# Patient Record
Sex: MALE | Race: WHITE | Employment: OTHER | URBAN - METROPOLITAN AREA
[De-identification: names, ages, dates, MRNs, and addresses within clinical notes are randomized per-mention and may not be internally consistent; named-entity substitution may affect disease eponyms.]

---

## 2018-11-06 ENCOUNTER — APPOINTMENT (OUTPATIENT)
Dept: CT IMAGING | Facility: CLINIC | Age: 73
End: 2018-11-06
Attending: PHYSICIAN ASSISTANT
Payer: MEDICARE

## 2018-11-06 ENCOUNTER — HOSPITAL ENCOUNTER (EMERGENCY)
Facility: CLINIC | Age: 73
Discharge: HOME | End: 2018-11-06
Attending: PHYSICIAN ASSISTANT | Admitting: PHYSICIAN ASSISTANT
Payer: MEDICARE

## 2018-11-06 VITALS
OXYGEN SATURATION: 96 % | BODY MASS INDEX: 39.25 KG/M2 | RESPIRATION RATE: 16 BRPM | WEIGHT: 265 LBS | TEMPERATURE: 97.6 F | SYSTOLIC BLOOD PRESSURE: 136 MMHG | DIASTOLIC BLOOD PRESSURE: 84 MMHG | HEIGHT: 69 IN

## 2018-11-06 DIAGNOSIS — H11.421 CHEMOSIS, CONJUNCTIVA, RIGHT: ICD-10-CM

## 2018-11-06 DIAGNOSIS — H00.032 CELLULITIS OF RIGHT LOWER EYELID: ICD-10-CM

## 2018-11-06 LAB
ANION GAP SERPL CALCULATED.3IONS-SCNC: 8 MMOL/L (ref 3–14)
BASOPHILS # BLD AUTO: 0.1 10E9/L (ref 0–0.2)
BASOPHILS NFR BLD AUTO: 0.8 %
BUN SERPL-MCNC: 36 MG/DL (ref 7–30)
CALCIUM SERPL-MCNC: 9.2 MG/DL (ref 8.5–10.1)
CHLORIDE SERPL-SCNC: 104 MMOL/L (ref 94–109)
CO2 SERPL-SCNC: 25 MMOL/L (ref 20–32)
CREAT SERPL-MCNC: 1.46 MG/DL (ref 0.66–1.25)
DIFFERENTIAL METHOD BLD: NORMAL
EOSINOPHIL # BLD AUTO: 0.6 10E9/L (ref 0–0.7)
EOSINOPHIL NFR BLD AUTO: 6.2 %
ERYTHROCYTE [DISTWIDTH] IN BLOOD BY AUTOMATED COUNT: 13.2 % (ref 10–15)
GFR SERPL CREATININE-BSD FRML MDRD: 47 ML/MIN/1.7M2
GLUCOSE SERPL-MCNC: 101 MG/DL (ref 70–99)
HCT VFR BLD AUTO: 43.3 % (ref 40–53)
HGB BLD-MCNC: 14.4 G/DL (ref 13.3–17.7)
IMM GRANULOCYTES # BLD: 0.1 10E9/L (ref 0–0.4)
IMM GRANULOCYTES NFR BLD: 0.7 %
LYMPHOCYTES # BLD AUTO: 1.3 10E9/L (ref 0.8–5.3)
LYMPHOCYTES NFR BLD AUTO: 14.4 %
MCH RBC QN AUTO: 31 PG (ref 26.5–33)
MCHC RBC AUTO-ENTMCNC: 33.3 G/DL (ref 31.5–36.5)
MCV RBC AUTO: 93 FL (ref 78–100)
MONOCYTES # BLD AUTO: 0.8 10E9/L (ref 0–1.3)
MONOCYTES NFR BLD AUTO: 8.5 %
NEUTROPHILS # BLD AUTO: 6.1 10E9/L (ref 1.6–8.3)
NEUTROPHILS NFR BLD AUTO: 69.4 %
NRBC # BLD AUTO: 0 10*3/UL
NRBC BLD AUTO-RTO: 0 /100
PLATELET # BLD AUTO: 232 10E9/L (ref 150–450)
POTASSIUM SERPL-SCNC: 4.5 MMOL/L (ref 3.4–5.3)
RBC # BLD AUTO: 4.64 10E12/L (ref 4.4–5.9)
SODIUM SERPL-SCNC: 137 MMOL/L (ref 133–144)
WBC # BLD AUTO: 8.8 10E9/L (ref 4–11)

## 2018-11-06 PROCEDURE — 99285 EMERGENCY DEPT VISIT HI MDM: CPT | Mod: 25 | Performed by: PHYSICIAN ASSISTANT

## 2018-11-06 PROCEDURE — 80048 BASIC METABOLIC PNL TOTAL CA: CPT | Performed by: PHYSICIAN ASSISTANT

## 2018-11-06 PROCEDURE — 70487 CT MAXILLOFACIAL W/DYE: CPT

## 2018-11-06 PROCEDURE — 85025 COMPLETE CBC W/AUTO DIFF WBC: CPT | Performed by: PHYSICIAN ASSISTANT

## 2018-11-06 PROCEDURE — 25000125 ZZHC RX 250: Performed by: PHYSICIAN ASSISTANT

## 2018-11-06 PROCEDURE — 99284 EMERGENCY DEPT VISIT MOD MDM: CPT | Mod: Z6 | Performed by: PHYSICIAN ASSISTANT

## 2018-11-06 PROCEDURE — 25000128 H RX IP 250 OP 636: Performed by: PHYSICIAN ASSISTANT

## 2018-11-06 RX ORDER — GLUCOSAMINE HCL/CHONDROITIN SU 500-400 MG
CAPSULE ORAL DAILY
COMMUNITY

## 2018-11-06 RX ORDER — ATENOLOL 50 MG/1
50 TABLET ORAL DAILY
COMMUNITY
End: 2022-01-20 | Stop reason: DRUGHIGH

## 2018-11-06 RX ORDER — SIMVASTATIN 40 MG
40 TABLET ORAL AT BEDTIME
COMMUNITY
End: 2021-10-27

## 2018-11-06 RX ORDER — IOPAMIDOL 755 MG/ML
80 INJECTION, SOLUTION INTRAVASCULAR ONCE
Status: COMPLETED | OUTPATIENT
Start: 2018-11-06 | End: 2018-11-06

## 2018-11-06 RX ORDER — LEVOTHYROXINE SODIUM 150 UG/1
150 TABLET ORAL DAILY
COMMUNITY
End: 2021-10-27

## 2018-11-06 RX ORDER — LISINOPRIL AND HYDROCHLOROTHIAZIDE 20; 25 MG/1; MG/1
1 TABLET ORAL DAILY
COMMUNITY
End: 2021-10-27

## 2018-11-06 RX ORDER — CEPHALEXIN 500 MG/1
500 CAPSULE ORAL 4 TIMES DAILY
Qty: 28 CAPSULE | Refills: 0 | Status: SHIPPED | OUTPATIENT
Start: 2018-11-06 | End: 2018-11-13

## 2018-11-06 RX ADMIN — SODIUM CHLORIDE 80 ML: 9 INJECTION, SOLUTION INTRAVENOUS at 16:56

## 2018-11-06 RX ADMIN — IOPAMIDOL 80 ML: 755 INJECTION, SOLUTION INTRAVENOUS at 16:56

## 2018-11-06 NOTE — ED AVS SNAPSHOT
[facility] Emergency Department    [address] [facility] [address]    Phone:  [phone]    Fax:  [phone]                                       Anil Gutierres Jr.   MRN: [id number]    Department:  [facility] Emergency Department   Date of Visit:  11/6/2018           After Visit Summary Signature Page     I have received my discharge instructions, and my questions have been answered. I have discussed any challenges I see with this plan with the nurse or doctor.    ..........................................................................................................................................  Patient/Patient Representative Signature      ..........................................................................................................................................  Patient Representative Print Name and Relationship to Patient    ..................................................               ................................................  Date                                   Time    ..........................................................................................................................................  Reviewed by Signature/Title    ...................................................              ..............................................  Date                                               Time          22EPIC Rev 08/18

## 2018-11-06 NOTE — ED AVS SNAPSHOT
[facility] Emergency Department    [address] [address]    Phone:  [phone]    Fax:  [phone]                                       Anil Gutierres Jr.   MRN: [id number]    Department:  [facility] Emergency Department   Date of Visit:  11/6/2018           Patient Information     Date Of Birth          1945        Your diagnoses for this visit were:     Cellulitis of right lower eyelid     Chemosis, conjunctiva, right        You were seen by Jody Alexis PA-C.      Follow-up Information     Follow up with Your eye doctor In 2 days.    Why:  as scheduled for follow-up        Follow up with [facility] Emergency Department.    Specialty:  EMERGENCY MEDICINE    Why:  As needed, If symptoms worsen    Contact information:    [address] [place] [id number] [phone]    Additional information:    The medical center is located at   [address] [facility] ([address] in [place], [place] miles north   of [place]).      24 Hour Appointment Hotline       To make an appointment at any [facility] clinic, call [phone] ([phone]). If you don't have a family doctor or clinic, we will help you find one. [facility] clinics are conveniently located to serve the needs of you and your family.             Review of your medicines      START taking        Dose / Directions Last dose taken    cephALEXin 500 MG capsule   Commonly known as:  KEFLEX   Dose:  500 mg   Quantity:  28 capsule        Take 1 capsule (500 mg) by mouth 4 times daily for 7 days   Refills:  0          Our records show that you are taking the medicines listed below. If these are incorrect, please call your family doctor or clinic.        Dose / Directions Last dose taken    aspirin 81 MG EC tablet   Dose:  81 mg        Take 81 mg by mouth daily   Refills:  0        atenolol 50 MG tablet   Commonly known as:  TENORMIN   Dose:  50 mg        Take 50 mg by mouth daily    Refills:  0        BLOOD GLUCOSE TEST STRIPS Strp        by In Vitro route daily   Refills:  0        levothyroxine 150 MCG tablet   Commonly known as:  SYNTHROID/LEVOTHROID   Dose:  150 mcg        Take 150 mcg by mouth daily   Refills:  0        lisinopril-hydrochlorothiazide 20-25 MG per tablet   Commonly known as:  PRINZIDE/ZESTORETIC   Dose:  1 tablet        Take 1 tablet by mouth daily   Refills:  0        simvastatin 40 MG tablet   Commonly known as:  ZOCOR   Dose:  40 mg        Take 40 mg by mouth At Bedtime   Refills:  0                Prescriptions were sent or printed at these locations (1 Prescription)                   [facility] [place], [place] - [address] [facility]   [address] [facility] [address]    Telephone:  [phone]   Fax:  [phone]   Hours:                  E-Prescribed (1 of 1)         cephALEXin (KEFLEX) 500 MG capsule                Procedures and tests performed during your visit     Basic metabolic panel    CBC with platelets differential    CT Facial Bones with Contrast    Peripheral IV catheter      Orders Needing Specimen Collection     None      Pending Results     Date and Time Order Name Status Description    11/6/2018 1553 CT Facial Bones with Contrast Preliminary             Pending Culture Results     No orders found from 11/4/2018 to 11/7/2018.            Pending Results Instructions     If you had any lab results that were not finalized at the time of your Discharge, you can call the ED Lab Result RN at [phone]. You will be contacted by this team for any positive Lab results or changes in treatment. The nurses are available 7 days a week from 10A to 6:30P.  You can leave a message 24 hours per day and they will return your call.        Test Results From Your Hospital Stay        11/6/2018  4:20 PM      Component Results     Component Value Ref Range & Units Status    WBC 8.8 4.0 - 11.0 10e9/L Final    RBC Count 4.64 4.4 - 5.9 10e12/L Final     Hemoglobin 14.4 13.3 - 17.7 g/dL Final    Hematocrit 43.3 40.0 - 53.0 % Final    MCV 93 78 - 100 fl Final    MCH 31.0 26.5 - 33.0 pg Final    MCHC 33.3 31.5 - 36.5 g/dL Final    RDW 13.2 10.0 - 15.0 % Final    Platelet Count 232 150 - 450 10e9/L Final    Diff Method Automated Method  Final    % Neutrophils 69.4 % Final    % Lymphocytes 14.4 % Final    % Monocytes 8.5 % Final    % Eosinophils 6.2 % Final    % Basophils 0.8 % Final    % Immature Granulocytes 0.7 % Final    Nucleated RBCs 0 0 /100 Final    Absolute Neutrophil 6.1 1.6 - 8.3 10e9/L Final    Absolute Lymphocytes 1.3 0.8 - 5.3 10e9/L Final    Absolute Monocytes 0.8 0.0 - 1.3 10e9/L Final    Absolute Eosinophils 0.6 0.0 - 0.7 10e9/L Final    Absolute Basophils 0.1 0.0 - 0.2 10e9/L Final    Abs Immature Granulocytes 0.1 0 - 0.4 10e9/L Final    Absolute Nucleated RBC 0.0  Final         11/6/2018  4:33 PM      Component Results     Component Value Ref Range & Units Status    Sodium 137 133 - 144 mmol/L Final    Potassium 4.5 3.4 - 5.3 mmol/L Final    Chloride 104 94 - 109 mmol/L Final    Carbon Dioxide 25 20 - 32 mmol/L Final    Anion Gap 8 3 - 14 mmol/L Final    Glucose 101 (H) 70 - 99 mg/dL Final    Urea Nitrogen 36 (H) 7 - 30 mg/dL Final    Creatinine 1.46 (H) 0.66 - 1.25 mg/dL Final    GFR Estimate 47 (L) >60 mL/min/1.7m2 Final    Non  GFR Calc    GFR Estimate If Black 57 (L) >60 mL/min/1.7m2 Final    African American GFR Calc    Calcium 9.2 8.5 - 10.1 mg/dL Final         11/6/2018  5:12 PM      Narrative     CT OF THE ORBITS AND FACE with CONTRAST   11/6/2018 5:07 PM     HISTORY: Right eye pain and conjunctival chemosis.     TECHNIQUE:  Axial scans and coronal reformations after the  administration of 80 mL Isovue 370 IV nonionic iodinated contrast  material.    COMPARISON: None.        Impression     IMPRESSION: There is mild thickening and increased density of the  lower eyelid on the right possibly representing cellulitis. There is  no  "evidence for underlying fluid collection or abscess related to this  area of presumed inflammation. The orbits and globes bilaterally are  unremarkable. There is no evidence for mass, fluid collection or  inflammation within either orbit. The paranasal sinuses are well  aerated with no evidence for acute sinusitis. There are no facial bone  fractures.      Radiation dose for this scan was reduced using automated exposure  control, adjustment of the mA and/or kV according to patient size, or  iterative reconstruction technique                Thank you for choosing [facility]       Thank you for choosing [facility] for your care. Our goal is always to provide you with excellent care. Hearing back from our patients is one way we can continue to improve our services. Please take a few minutes to complete the written survey that you may receive in the mail after you visit with us. Thank you!        [employer] Information     [employer] lets you send messages to your doctor, view your test results, renew your prescriptions, schedule appointments and more. To sign up, go to [email or web address] . Click on \"Log in\" on the left side of the screen, which will take you to the Welcome page. Then click on \"Sign up Now\" on the right side of the page.     You will be asked to enter the access code listed below, as well as some personal information. Please follow the directions to create your username and password.     Your access code is: [email or web address]  Expires: 2019  6:01 PM     Your access code will  in 90 days. If you need help or a new code, please call your [facility] clinic or [phone].        Care EveryWhere ID     This is your Care EveryWhere ID. This could be used by other organizations to access your [facility] medical records  [id number]        Equal Access to Services     JOHN CURTIS : Sylvie Thorpe, sonido saleh, jonathan key. [employer] " [phone].    ATENCIÓN: Si habla español, tiene a nelson disposición servicios gratuitos de asistencia lingüística. Llame al [phone].    We comply with applicable federal civil rights laws and [place] laws. We do not discriminate on the basis of race, color, national origin, age, disability, sex, sexual orientation, or gender identity.            After Visit Summary       This is your record. Keep this with you and show to your community pharmacist(s) and doctor(s) at your next visit.

## 2018-11-06 NOTE — ED PROVIDER NOTES
History     Chief Complaint   Patient presents with     Facial Swelling     right eye swelling for 2 days, [facility] recommend CT scan     HPI  Anil Gutierres Jr. is a 72 year old male who presents from his eye appointment for evaluation of right eye conjunctival chemosis.  Patient was evaluated just prior to arrival at [facility] who evaluated patient and recommended he come into the emergency department in order to receive a CT scan of his orbits and sinuses with and without contrast in order to rule out orbital inflammation, infection, etc.  Patient states he has had 2 days of right eye symptoms.  Yesterday he was experiencing significant right eye pain and limited range of motion.  He states he was unable to look around.  This morning he states his pain had completely resolved in this eye and he is moving his eye without difficulties, but he subsequently developed swelling to the surface of the eyeball.  Patient denies any other associated symptoms.  He has no URI or sinus symptoms.  Patient is a type II diabetic but states he is well controlled and there was reportedly no evidence of diabetic retinopathy on today's eye exam.  Denies fevers, chills, headache, cough, sore throat, sinus pressure, nasal congestion, or rhinorrhea.  Patient denies drainage from the eye.  No matting of the eye.  Pt wears glasses.  She complains of some blurry vision to this right eye.  He denies any preceding injury or trauma to the eye.  No reported foreign body sensation.       Problem List:    There are no active problems to display for this patient.       Past Medical History:    No past medical history on file.    Past Surgical History:    No past surgical history on file.    Family History:    No family history on file.    Social History:  Marital Status:    Social History   Substance Use Topics     Smoking status: Not on file     Smokeless tobacco: Not on file     Alcohol use Not on file     "    Medications:      aspirin 81 MG EC tablet   atenolol (TENORMIN) 50 MG tablet   cephALEXin (KEFLEX) 500 MG capsule   Glucose Blood (BLOOD GLUCOSE TEST STRIPS) STRP   levothyroxine (SYNTHROID/LEVOTHROID) 150 MCG tablet   lisinopril-hydrochlorothiazide (PRINZIDE/ZESTORETIC) 20-25 MG per tablet   simvastatin (ZOCOR) 40 MG tablet         Review of Systems   Constitutional: Negative.    HENT: Negative.    Eyes: Positive for pain and redness. Negative for photophobia, discharge, itching and visual disturbance.   Respiratory: Negative.    Gastrointestinal: Negative.    Musculoskeletal: Negative.    Skin: Negative.    Neurological: Negative.    All other systems reviewed and are negative.      Physical Exam   BP: 166/87  Heart Rate: 52  Temp: 97.6  F (36.4  C)  Resp: 16  Height: 175.3 cm (5' 9\")  Weight: 120.2 kg (265 lb)  SpO2: 100 %      Physical Exam   Constitutional: He is oriented to person, place, and time. He appears well-developed and well-nourished.  Non-toxic appearance. No distress.   HENT:   Head: Normocephalic and atraumatic.   Right Ear: Tympanic membrane, external ear and ear canal normal.   Left Ear: Tympanic membrane, external ear and ear canal normal.   Nose: No mucosal edema or rhinorrhea. Right sinus exhibits no maxillary sinus tenderness. Left sinus exhibits no maxillary sinus tenderness.   Mouth/Throat: Uvula is midline and oropharynx is clear and moist. No oropharyngeal exudate or posterior oropharyngeal erythema.   Eyes: EOM and lids are normal. Pupils are equal, round, and reactive to light. Right eye exhibits chemosis. Right eye exhibits no discharge, no exudate and no hordeolum. No foreign body present in the right eye. Right conjunctiva is injected.       Small localized area of swelling and erythema inferior to right lower eyelid.   Neck: Normal range of motion. Neck supple. No rigidity.   Cardiovascular: Normal rate, regular rhythm and normal heart sounds.    Pulmonary/Chest: Effort normal " and breath sounds normal. No stridor. No respiratory distress. He has no wheezes.   Lymphadenopathy:     He has no cervical adenopathy.   Neurological: He is alert and oriented to person, place, and time.   Skin: Skin is warm and dry. No rash noted.       ED Course     ED Course     Procedures    Results for orders placed or performed during the hospital encounter of 11/06/18 (from the past 24 hour(s))   CBC with platelets differential   Result Value Ref Range    WBC 8.8 4.0 - 11.0 10e9/L    RBC Count 4.64 4.4 - 5.9 10e12/L    Hemoglobin 14.4 13.3 - 17.7 g/dL    Hematocrit 43.3 40.0 - 53.0 %    MCV 93 78 - 100 fl    MCH 31.0 26.5 - 33.0 pg    MCHC 33.3 31.5 - 36.5 g/dL    RDW 13.2 10.0 - 15.0 %    Platelet Count 232 150 - 450 10e9/L    Diff Method Automated Method     % Neutrophils 69.4 %    % Lymphocytes 14.4 %    % Monocytes 8.5 %    % Eosinophils 6.2 %    % Basophils 0.8 %    % Immature Granulocytes 0.7 %    Nucleated RBCs 0 0 /100    Absolute Neutrophil 6.1 1.6 - 8.3 10e9/L    Absolute Lymphocytes 1.3 0.8 - 5.3 10e9/L    Absolute Monocytes 0.8 0.0 - 1.3 10e9/L    Absolute Eosinophils 0.6 0.0 - 0.7 10e9/L    Absolute Basophils 0.1 0.0 - 0.2 10e9/L    Abs Immature Granulocytes 0.1 0 - 0.4 10e9/L    Absolute Nucleated RBC 0.0    Basic metabolic panel   Result Value Ref Range    Sodium 137 133 - 144 mmol/L    Potassium 4.5 3.4 - 5.3 mmol/L    Chloride 104 94 - 109 mmol/L    Carbon Dioxide 25 20 - 32 mmol/L    Anion Gap 8 3 - 14 mmol/L    Glucose 101 (H) 70 - 99 mg/dL    Urea Nitrogen 36 (H) 7 - 30 mg/dL    Creatinine 1.46 (H) 0.66 - 1.25 mg/dL    GFR Estimate 47 (L) >60 mL/min/1.7m2    GFR Estimate If Black 57 (L) >60 mL/min/1.7m2    Calcium 9.2 8.5 - 10.1 mg/dL   CT Facial Bones with Contrast    Narrative    CT OF THE ORBITS AND FACE with CONTRAST   11/6/2018 5:07 PM     HISTORY: Right eye pain and conjunctival chemosis.     TECHNIQUE:  Axial scans and coronal reformations after the  administration of 80 mL Isovue  370 IV nonionic iodinated contrast  material.    COMPARISON: None.      Impression    IMPRESSION: There is mild thickening and increased density of the  lower eyelid on the right possibly representing cellulitis. There is  no evidence for underlying fluid collection or abscess related to this  area of presumed inflammation. The orbits and globes bilaterally are  unremarkable. There is no evidence for mass, fluid collection or  inflammation within either orbit. The paranasal sinuses are well  aerated with no evidence for acute sinusitis. There are no facial bone  fractures.      Radiation dose for this scan was reduced using automated exposure  control, adjustment of the mA and/or kV according to patient size, or  iterative reconstruction technique       Medications   iopamidol (ISOVUE-370) solution 80 mL (80 mLs Intravenous Given 11/6/18 1656)   sodium chloride 0.9 % bag 500mL for CT scan flush use (80 mLs Intravenous Given 11/6/18 1656)       Assessments & Plan (with Medical Decision Making)     Pt is a 72 year old male who presents from his eye appointment for evaluation of right eye conjunctival chemosis.  Patient was evaluated just prior to arrival at [facility] who evaluated patient and recommended he come into the emergency department in order to receive a CT scan of his orbits and sinuses with and without contrast in order to rule out orbital inflammation, infection, etc.  Patient states he has had 2 days of right eye symptoms.  Yesterday he was experiencing significant right eye pain and limited range of motion.  He states he was unable to look around.  This morning he states his pain had completely resolved in this eye and he is moving his eye without difficulties, but he subsequently developed swelling to the surface of the eyeball.  Patient denies any other associated symptoms.  He has no URI or sinus symptoms.  Patient is a type II diabetic but states he is well controlled and there was  reportedly no evidence of diabetic retinopathy on today's eye exam.  No eye drainage.  He denies any preceding injury or trauma to the eye.  No reported foreign body sensation.  Pt is afebrile on arrival.  Exam as above.  Patient had a conference of eye exam just prior to arrival at the eye doctor.  CT of facial bones with contrast was obtained in the emergency department and reveals a mild thickening and increased density of the right lower eyelid possibly representing cellulitis.  No evidence of underlying abscess of this area.  The orbits and globes bilaterally are unremarkable.  No evidence for mass, fluid collection, or inflammation within either orbit.  No evidence of acute sinusitis.  Given this area of potential cellulitis inferior to patient's right lower eyelid, will place on antibiotics.  He has a follow-up in 2 days again with ophthalmology.  Discussed results with patient.  Return precautions were reviewed.  Hand-outs were provided.    Patient was sent with Keflex and was instructed to follow-up with ophthalmology in 2 days as scheduled for recheck and for continued care and management.  He is to return to the ED for persistent and/or worsening symptoms.  Patient expressed understanding of the diagnosis and plan and was discharged home in good condition.    I have reviewed the nursing notes.    I have reviewed the findings, diagnosis, plan and need for follow up with the patient.    New Prescriptions    CEPHALEXIN (KEFLEX) 500 MG CAPSULE    Take 1 capsule (500 mg) by mouth 4 times daily for 7 days       Final diagnoses:   Cellulitis of right lower eyelid   Chemosis, conjunctiva, right       11/6/2018   [facility] EMERGENCY DEPARTMENT      Disclaimer:  This note consists of symbols derived from keyboarding, dictation and/or voice recognition software.  As a result, there may be errors in the script that have gone undetected.  Please consider this when interpreting information found in this chart.      Jody Alexis PA-C  11/07/18 1105

## 2018-11-06 NOTE — ED NOTES
Pt present to ED with right eye pain. Pt reports pain has been present for a couple days. Pt saw an ophthalmologist who recommended a CT scan to assess for orbital swelling.

## 2018-11-07 ASSESSMENT — ENCOUNTER SYMPTOMS
MUSCULOSKELETAL NEGATIVE: 1
RESPIRATORY NEGATIVE: 1
NEUROLOGICAL NEGATIVE: 1
EYE PAIN: 1
EYE REDNESS: 1
CONSTITUTIONAL NEGATIVE: 1
PHOTOPHOBIA: 0
EYE ITCHING: 0
EYE DISCHARGE: 0
GASTROINTESTINAL NEGATIVE: 1

## 2019-02-05 ENCOUNTER — OFFICE VISIT (OUTPATIENT)
Dept: DERMATOLOGY | Facility: CLINIC | Age: 74
End: 2019-02-05
Payer: COMMERCIAL

## 2019-02-05 VITALS — HEART RATE: 60 BPM | DIASTOLIC BLOOD PRESSURE: 75 MMHG | OXYGEN SATURATION: 97 % | SYSTOLIC BLOOD PRESSURE: 133 MMHG

## 2019-02-05 DIAGNOSIS — C44.01 BASAL CELL CARCINOMA (BCC) OF SKIN OF LIP: Primary | ICD-10-CM

## 2019-02-05 DIAGNOSIS — C44.219 BASAL CELL CARCINOMA (BCC) OF LEFT EAR: ICD-10-CM

## 2019-02-05 DIAGNOSIS — L82.1 SEBORRHEIC KERATOSIS: ICD-10-CM

## 2019-02-05 DIAGNOSIS — L81.4 LENTIGO: ICD-10-CM

## 2019-02-05 PROCEDURE — 88331 PATH CONSLTJ SURG 1 BLK 1SPC: CPT | Performed by: DERMATOLOGY

## 2019-02-05 PROCEDURE — 40490 BIOPSY OF LIP: CPT | Performed by: DERMATOLOGY

## 2019-02-05 PROCEDURE — 99203 OFFICE O/P NEW LOW 30 MIN: CPT | Mod: 25 | Performed by: DERMATOLOGY

## 2019-02-05 PROCEDURE — 69100 BIOPSY OF EXTERNAL EAR: CPT | Mod: 51 | Performed by: DERMATOLOGY

## 2019-02-05 NOTE — PROGRESS NOTES
Anil Gutierres Jr. is a 73 year old year old male patient here today for spot on lip and ear.   .  Patient states this has been present for a while.  Patient reports the following symptoms:  growing.  Patient reports the following previous treatments none.  Patient reports the following modifying factors none.  Associated symptoms: none.  Patient has no other skin complaints today.  Remainder of the HPI, Meds, PMH, Allergies, FH, and SH was reviewed in chart.    History reviewed. No pertinent past medical history.    History reviewed. No pertinent surgical history.     History reviewed. No pertinent family history.    Social History     Socioeconomic History     Marital status:      Spouse name: Not on file     Number of children: Not on file     Years of education: Not on file     Highest education level: Not on file   Social Needs     Financial resource strain: Not on file     Food insecurity - worry: Not on file     Food insecurity - inability: Not on file     Transportation needs - medical: Not on file     Transportation needs - non-medical: Not on file   Occupational History     Not on file   Tobacco Use     Smoking status: Never Smoker     Smokeless tobacco: Never Used   Substance and Sexual Activity     Alcohol use: Not on file     Drug use: Not on file     Sexual activity: Not on file   Other Topics Concern     Not on file   Social History Narrative     Not on file       Outpatient Encounter Medications as of 2019   Medication Sig Dispense Refill     aspirin 81 MG EC tablet Take 81 mg by mouth daily       atenolol (TENORMIN) 50 MG tablet Take 50 mg by mouth daily       [] cephALEXin (KEFLEX) 500 MG capsule Take 1 capsule (500 mg) by mouth 4 times daily for 7 days 28 capsule 0     Glucose Blood (BLOOD GLUCOSE TEST STRIPS) STRP by In Vitro route daily       levothyroxine (SYNTHROID/LEVOTHROID) 150 MCG tablet Take 150 mcg by mouth daily       lisinopril-hydrochlorothiazide  (PRINZIDE/ZESTORETIC) 20-25 MG per tablet Take 1 tablet by mouth daily       simvastatin (ZOCOR) 40 MG tablet Take 40 mg by mouth At Bedtime       No facility-administered encounter medications on file as of 2/5/2019.              Review Of Systems  Skin: As above  Eyes: negative  Ears/Nose/Throat: negative  Respiratory: No shortness of breath, dyspnea on exertion, cough, or hemoptysis  Cardiovascular: negative  Gastrointestinal: negative  Genitourinary: negative  Musculoskeletal: negative  Neurologic: negative  Psychiatric: negative  Hematologic/Lymphatic/Immunologic: negative  Endocrine: negative      O:   NAD, WDWN, Alert & Oriented, Mood & Affect wnl, Vitals stable   Here today alone   /75   Pulse 60   SpO2 97%    General appearance normal   Vitals stable   Alert, oriented and in no acute distress      Following lymph nodes palpated: Occipital, Cervical, Supraclavicular no lad   Stuck on papules and brown macules on trunk and ext    L helix 1cm crusted scaly papule    L upper cut lip 1.6cm pink pearly papule      The remainder of expanded problem focused exam was unremarkable; the following areas were examined:  scalp/hair, conjunctiva/lids, face, neck, lips, chest, digits/nails, RUE, LUE.      Eyes: Conjunctivae/lids:Normal     ENT: Lips, buccal mucosa, tongue: normal    MSK:Normal    Cardiovascular: peripheral edema none    Pulm: Breathing Normal    Lymph Nodes: No Head and Neck Lymphadenopathy     Neuro/Psych: Orientation:Normal; Mood/Affect:Normal      MICRO:     L upper cut lip:Orthokeratosis of epidermis with a proliferation of nests of basaloid cells, with peripheral palisading and a haphazard arrangement in the center extending into the dermis, forming nodules.  The tumor cells have hyperchromatic nuclei. Poor cytoplasm and intercellular bridging.    L hliex:Orthokeratosis of epidermis with a proliferation of nests of basaloid cells, with peripheral palisading and a haphazard arrangement in the  center extending into the dermis, forming nodules.  The tumor cells have hyperchromatic nuclei. Poor cytoplasm and intercellular bridging.    A/P:    1. Seborrheic keratosis, lentigo,   2. R/o basal cell carcinoma   TANGENTIAL BIOPSY IN HOUSE:  After consent, anesthesia with LEC and prep, tangential excision performed and dx above confirmed with frozen section histology.  No complications and routine wound care.  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  I explained that treatment of these lesions would be purely cosmetic and not medically neccessary.  I discussed with patient different removal options including excision, cautery and /or laser.      Nature and genetics of benign skin lesions dicussed with patient.  Signs and Symptoms of skin cancer discussed with patient.  Patient encouraged to perform monthly skin exams.  UV precautions reviewed with patient.  Patient to follow up with Primary Care provider regarding elevated blood pressure.  Skin care regimen reviewed with patient: Eliminate harsh soaps, i.e. Dial, zest, irsih spring; Mild soaps such as Cetaphil or Dove sensitive skin, avoid hot or cold showers, aggressive use of emollients including vanicream, cetaphil or cerave discussed with patient.    Risks of non-melanoma skin cancer discussed with patient   Return to clinic next appt

## 2019-02-05 NOTE — LETTER
2019         RE: Anil Gutierres Jr.  [address] [facility] [address]        Dear Colleague,    Thank you for referring your patient, Anil Gutierres Jr., to the [facility]. Please see a copy of my visit note below.    Anil Gutierres Jr. is a 73 year old year old male patient here today for spot on lip and ear.   .  Patient states this has been present for a while.  Patient reports the following symptoms:  growing.  Patient reports the following previous treatments none.  Patient reports the following modifying factors none.  Associated symptoms: none.  Patient has no other skin complaints today.  Remainder of the HPI, Meds, PMH, Allergies, FH, and SH was reviewed in chart.    History reviewed. No pertinent past medical history.    History reviewed. No pertinent surgical history.     History reviewed. No pertinent family history.    Social History     Socioeconomic History     Marital status:      Spouse name: Not on file     Number of children: Not on file     Years of education: Not on file     Highest education level: Not on file   Social Needs     Financial resource strain: Not on file     Food insecurity - worry: Not on file     Food insecurity - inability: Not on file     Transportation needs - medical: Not on file     Transportation needs - non-medical: Not on file   Occupational History     Not on file   Tobacco Use     Smoking status: Never Smoker     Smokeless tobacco: Never Used   Substance and Sexual Activity     Alcohol use: Not on file     Drug use: Not on file     Sexual activity: Not on file   Other Topics Concern     Not on file   Social History Narrative     Not on file       Outpatient Encounter Medications as of 2019   Medication Sig Dispense Refill     aspirin 81 MG EC tablet Take 81 mg by mouth daily       atenolol (TENORMIN) 50 MG tablet Take 50 mg by mouth daily       [] cephALEXin (KEFLEX) 500 MG capsule Take 1 capsule (500 mg) by mouth 4 times  daily for 7 days 28 capsule 0     Glucose Blood (BLOOD GLUCOSE TEST STRIPS) STRP by In Vitro route daily       levothyroxine (SYNTHROID/LEVOTHROID) 150 MCG tablet Take 150 mcg by mouth daily       lisinopril-hydrochlorothiazide (PRINZIDE/ZESTORETIC) 20-25 MG per tablet Take 1 tablet by mouth daily       simvastatin (ZOCOR) 40 MG tablet Take 40 mg by mouth At Bedtime       No facility-administered encounter medications on file as of 2/5/2019.              Review Of Systems  Skin: As above  Eyes: negative  Ears/Nose/Throat: negative  Respiratory: No shortness of breath, dyspnea on exertion, cough, or hemoptysis  Cardiovascular: negative  Gastrointestinal: negative  Genitourinary: negative  Musculoskeletal: negative  Neurologic: negative  Psychiatric: negative  Hematologic/Lymphatic/Immunologic: negative  Endocrine: negative      O:   NAD, WDWN, Alert & Oriented, Mood & Affect wnl, Vitals stable   Here today alone   /75   Pulse 60   SpO2 97%    General appearance normal   Vitals stable   Alert, oriented and in no acute distress      Following lymph nodes palpated: Occipital, Cervical, Supraclavicular no lad   Stuck on papules and brown macules on trunk and ext    L helix 1cm crusted scaly papule    L upper cut lip 1.6cm pink pearly papule      The remainder of expanded problem focused exam was unremarkable; the following areas were examined:  scalp/hair, conjunctiva/lids, face, neck, lips, chest, digits/nails, RUE, LUE.      Eyes: Conjunctivae/lids:Normal     ENT: Lips, buccal mucosa, tongue: normal    MSK:Normal    Cardiovascular: peripheral edema none    Pulm: Breathing Normal    Lymph Nodes: No Head and Neck Lymphadenopathy     Neuro/Psych: Orientation:Normal; Mood/Affect:Normal      MICRO:     L upper cut lip:Orthokeratosis of epidermis with a proliferation of nests of basaloid cells, with peripheral palisading and a haphazard arrangement in the center extending into the dermis, forming nodules.  The tumor  cells have hyperchromatic nuclei. Poor cytoplasm and intercellular bridging.    L hliex:Orthokeratosis of epidermis with a proliferation of nests of basaloid cells, with peripheral palisading and a haphazard arrangement in the center extending into the dermis, forming nodules.  The tumor cells have hyperchromatic nuclei. Poor cytoplasm and intercellular bridging.    A/P:    1. Seborrheic keratosis, lentigo,   2. R/o basal cell carcinoma   TANGENTIAL BIOPSY IN HOUSE:  After consent, anesthesia with LEC and prep, tangential excision performed and dx above confirmed with frozen section histology.  No complications and routine wound care.  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  I explained that treatment of these lesions would be purely cosmetic and not medically neccessary.  I discussed with patient different removal options including excision, cautery and /or laser.      Nature and genetics of benign skin lesions dicussed with patient.  Signs and Symptoms of skin cancer discussed with patient.  Patient encouraged to perform monthly skin exams.  UV precautions reviewed with patient.  Patient to follow up with Primary Care provider regarding elevated blood pressure.  Skin care regimen reviewed with patient: Eliminate harsh soaps, i.e. Dial, zest, irsih spring; Mild soaps such as Cetaphil or Dove sensitive skin, avoid hot or cold showers, aggressive use of emollients including vanicream, cetaphil or cerave discussed with patient.    Risks of non-melanoma skin cancer discussed with patient   Return to clinic next appt     Again, thank you for allowing me to participate in the care of your patient.        Sincerely,        Martin Marquis MD

## 2019-02-05 NOTE — NURSING NOTE
"Initial /75   Pulse 60   SpO2 97%  Estimated body mass index is 39.13 kg/m  as calculated from the following:    Height as of 11/6/18: 1.753 m (5' 9\").    Weight as of 11/6/18: 120.2 kg (265 lb). .      "

## 2019-02-05 NOTE — PATIENT INSTRUCTIONS
Wound Care Instructions     FOR SUPERFICIAL WOUNDS     [facility] [phone]    [facility] [phone]                       AFTER 24 HOURS YOU SHOULD REMOVE THE BANDAGE AND BEGIN DAILY DRESSING CHANGES AS FOLLOWS:     1) Remove Dressing.     2) Clean and dry the area with tap water using a Q-tip or sterile gauze pad.     3) Apply Vaseline, Aquaphor, Polysporin ointment or Bacitracin ointment over entire wound.  Do NOT use Neosporin ointment.     4) Cover the wound with a band-aid, or a sterile non-stick gauze pad and micropore paper tape      REPEAT THESE INSTRUCTIONS AT LEAST ONCE A DAY UNTIL THE WOUND HAS COMPLETELY HEALED.    It is an old wives tale that a wound heals better when it is exposed to air and allowed to dry out. The wound will heal faster with a better cosmetic result if it is kept moist with ointment and covered with a bandage.    **Do not let the wound dry out.**      Supplies Needed:      *Cotton tipped applicators (Q-tips)    *Polysporin Ointment or Bacitracin Ointment (NOT NEOSPORIN)    *Band-aids or non-stick gauze pads and micropore paper tape.      PATIENT INFORMATION:    During the healing process you will notice a number of changes. All wounds develop a small halo of redness surrounding the wound.  This means healing is occurring. Severe itching with extensive redness usually indicates sensitivity to the ointment or bandage tape used to dress the wound.  You should call our office if this develops.      Swelling  and/or discoloration around your surgical site is common, particularly when performed around the eye.    All wounds normally drain.  The larger the wound the more drainage there will be.  After 7-10 days, you will notice the wound beginning to shrink and new skin will begin to grow.  The wound is healed when you can see skin has formed over the entire area.  A healed wound has a healthy, shiny look to the surface and is red to dark pink in color  to normalize.  Wounds may take approximately 4-6 weeks to heal.  Larger wounds may take 6-8 weeks.  After the wound is healed you may discontinue dressing changes.    You may experience a sensation of tightness as your wound heals. This is normal and will gradually subside.    Your healed wound may be sensitive to temperature changes. This sensitivity improves with time, but if you re having a lot of discomfort, try to avoid temperature extremes.    Patients frequently experience itching after their wound appears to have healed because of the continue healing under the skin.  Plain Vaseline will help relieve the itching.        POSSIBLE COMPLICATIONS    BLEEDIN. Leave the bandage in place.  2. Use tightly rolled up gauze or a cloth to apply direct pressure over the bandage for 30  minutes.  3. Reapply pressure for an additional 30 minutes if necessary  4. Use additional gauze and tape to maintain pressure once the bleeding has stopped.

## 2019-02-06 ENCOUNTER — TELEPHONE (OUTPATIENT)
Dept: DERMATOLOGY | Facility: CLINIC | Age: 74
End: 2019-02-06

## 2019-02-06 NOTE — TELEPHONE ENCOUNTER
Patient notified. Patient verbalized understanding. Scheduled already for for MOHS Surgery x 2 and he did verify he was given Mohs brochure/Pre-op letter.     Marcela Engel RN

## 2019-02-06 NOTE — TELEPHONE ENCOUNTER
Martin Marquis MD  [facility]             L upper lip basal cell carcinoma   L helix basal cell carcinoma   Schedule excision

## 2019-02-19 ENCOUNTER — OFFICE VISIT (OUTPATIENT)
Dept: DERMATOLOGY | Facility: CLINIC | Age: 74
End: 2019-02-19
Payer: COMMERCIAL

## 2019-02-19 VITALS — OXYGEN SATURATION: 94 % | HEART RATE: 57 BPM | SYSTOLIC BLOOD PRESSURE: 120 MMHG | DIASTOLIC BLOOD PRESSURE: 71 MMHG

## 2019-02-19 DIAGNOSIS — C44.01 BASAL CELL CARCINOMA OF LIP: Primary | ICD-10-CM

## 2019-02-19 PROCEDURE — 14061 TIS TRNFR E/N/E/L10.1-30SQCM: CPT | Performed by: DERMATOLOGY

## 2019-02-19 PROCEDURE — 17312 MOHS ADDL STAGE: CPT | Performed by: DERMATOLOGY

## 2019-02-19 PROCEDURE — 17311 MOHS 1 STAGE H/N/HF/G: CPT | Mod: 51 | Performed by: DERMATOLOGY

## 2019-02-19 NOTE — PATIENT INSTRUCTIONS
Sutured Wound Care Instructions  For Lips or Chin       ? No strenuous activity for 48 hours. Resume moderate activity in 48 hours. No heavy exercising until you are seen for follow up in one week.     ? Take Tylenol as needed for discomfort.                         ? Do not drink alcoholic beverages for 48 hours.     ? Keep the pressure bandage in place for 24 hours. If the bandage becomes blood tinged or loose, reinforce it with gauze and tape.       (Refer to the reverse side of this page for management of bleeding).    ? Remove bandage in 24 hours     ? NO STRAWS or puckering motion  for 2 weeks    ? Leave the flat bandage in place until your follow up appointment.    ? Keep the bandage dry. Wash around it carefully.    ? If the tape becomes soiled or starts to come off, reinforce it with additional paper tape.    ? Do not smoke for 3 weeks; smoking is detrimental to wound healing.    ? It is normal to have swelling and bruising around the surgical site. The bruising will fade in approximately 10-14 days. Elevate the area to reduce swelling.    ? Try to keep your lips / chin as immobile as possible. Refrain from laughing, smiling and yawning for 3 weeks.    ? Eat soft foods for the first 24 hours and take small bites of food for the entire three weeks.    ? When brushing your teeth, you should use a child s toothbrush or use mouth wash to prevent stretching of surgery site.    ? Numbness, itchiness and sensitivity to temperature changes can occur after surgery and may take up to 18 months to normalize.          POSSIBLE COMPLICATIONS      BLEEDIN. Leave the bandage in place.  2. Use tightly rolled up gauze or a cloth to apply direct pressure over the bandage for 20   minutes.  3. Reapply pressure for an additional 20 minutes if necessary  4. Call the office or go to the nearest emergency room if pressure fails to stop the bleeding.  5. Use additional gauze and tape to maintain pressure once the  bleeding has stopped.        PAIN:    1. Post operative pain should slowly get better, never worse.  2. A severe increase in pain may indicate a problem. Call the office if this occurs.            In case of emergency phone:  [phone]  Doctor Kandis:[phone]

## 2019-02-19 NOTE — NURSING NOTE
Chief Complaint   Patient presents with     Derm Problem     mohs 1 of 2       Vitals:    02/19/19 0759   BP: 120/71   Pulse: 57   SpO2: 94%     Wt Readings from Last 1 Encounters:   11/06/18 120.2 kg (265 lb)       Macrina Mcfadden LPN.................2/19/2019

## 2019-02-19 NOTE — LETTER
2/19/2019         RE: Anil Gutierres Jr.  [address] [facility] [address]        Dear Colleague,    Thank you for referring your patient, Anil Gutierres Jr., to the [facility]. Please see a copy of my visit note below.    Surgical Office Location :   [facility]  [address] [place] [address]      Anil Gutierres Jr. is a 73 year old year old male patient here today for evaluation and managment of basal cell carcinoma on lip.  Patient reports the following modifying factors none.  Associated symptoms: none.  Patient has no other skin complaints today.  Remainder of the HPI, Meds, PMH, Allergies, FH, and SH was reviewed in chart.      Past Medical History:   Diagnosis Date     Basal cell carcinoma        History reviewed. No pertinent surgical history.     History reviewed. No pertinent family history.    Social History     Socioeconomic History     Marital status:      Spouse name: Not on file     Number of children: Not on file     Years of education: Not on file     Highest education level: Not on file   Social Needs     Financial resource strain: Not on file     Food insecurity - worry: Not on file     Food insecurity - inability: Not on file     Transportation needs - medical: Not on file     Transportation needs - non-medical: Not on file   Occupational History     Not on file   Tobacco Use     Smoking status: Never Smoker     Smokeless tobacco: Never Used   Substance and Sexual Activity     Alcohol use: Not on file     Drug use: Not on file     Sexual activity: Not on file   Other Topics Concern     Not on file   Social History Narrative     Not on file       Outpatient Encounter Medications as of 2/19/2019   Medication Sig Dispense Refill     aspirin 81 MG EC tablet Take 81 mg by mouth daily       atenolol (TENORMIN) 50 MG tablet Take 50 mg by mouth daily       Glucose Blood (BLOOD GLUCOSE TEST STRIPS) STRP by In Vitro route daily       levothyroxine  (SYNTHROID/LEVOTHROID) 150 MCG tablet Take 150 mcg by mouth daily       lisinopril-hydrochlorothiazide (PRINZIDE/ZESTORETIC) 20-25 MG per tablet Take 1 tablet by mouth daily       simvastatin (ZOCOR) 40 MG tablet Take 40 mg by mouth At Bedtime       [] cephALEXin (KEFLEX) 500 MG capsule Take 1 capsule (500 mg) by mouth 4 times daily for 7 days 28 capsule 0     No facility-administered encounter medications on file as of 2019.              Review Of Systems  Skin: As above  Eyes: negative  Ears/Nose/Throat: negative  Respiratory: No shortness of breath, dyspnea on exertion, cough, or hemoptysis  Cardiovascular: negative  Gastrointestinal: negative  Genitourinary: negative  Musculoskeletal: negative  Neurologic: negative  Psychiatric: negative  Hematologic/Lymphatic/Immunologic: negative  Endocrine: negative      O:   NAD, WDWN, Alert & Oriented, Mood & Affect wnl, Vitals stable   Here today alone   /71   Pulse 57   SpO2 94%    General appearance normal   Vitals stable   Alert, oriented and in no acute distress     L upper cut lip 1.6cm pink pearly papule       Eyes: Conjunctivae/lids:Normal     ENT: Lips, buccal mucosa, tongue: normal    MSK:Normal    Cardiovascular: peripheral edema none    Pulm: Breathing Normal    Lymph Nodes: No Head and Neck Lymphadenopathy     Neuro/Psych: Orientation:Normal; Mood/Affect:Normal      A/P:  1. 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 Indication for Mohs was Location. Patient confirmed the site with Dr. Marquis.  After anesthesia with LEC, the tumor was excised using standard Mohs technique in 2 stages(s).  CLEAR MARGINS OBTAINED and Final defect size was 2.3 x 2.5 cm.     REPAIR WITH BUROW'S FLAP: Because of the Because of the size and full thickness nature of the defect, an advancement flap was planned. After LEC anesthesia and prep, the Burow's triangles were excised.  One Burow's triangle was displaced laterally laong alar rim to hide incisions within skin relaxation lines. The advancement flap was raised by dissection in the deep subcutaneous plane. The remaining wound edges were undermined and hemostasis was obtained. The flap was advanced into the defect with care to avoid distortion and was sutured into place in a layered fashion using Vicryl and Fast Absorbing sutures. Postoperative size was 4.5 x 3 cm.  EBL minimal; complications none; wound care routine.  The patient was discharged in good condition and will return in one week for wound evaluation.        BENIGN LESIONS DISCUSSED WITH PATIENT:  I discussed the specifics of tumor, prognosis, and genetics of benign lesions.  I explained that treatment of these lesions would be purely cosmetic and not medically neccessary.  I discussed with patient different removal options including excision, cautery and /or laser.      Nature and genetics of benign skin lesions dicussed with patient.  Signs and Symptoms of skin cancer discussed with patient.  Patient encouraged to perform monthly skin exams.  UV precautions reviewed with patient.  Patient to follow up with Primary Care provider regarding elevated blood pressure.  Skin care regimen reviewed with patient: Eliminate harsh soaps, i.e. Dial, zest, irsih spring; Mild soaps such as Cetaphil or Dove sensitive skin, avoid hot or cold showers, aggressive use of emollients including vanicream, cetaphil or cerave discussed with patient.    Risks of non-melanoma skin cancer discussed with patient   Return to clinic 3 months      Again, thank you for allowing me to participate in the care of your patient.        Sincerely,        Martin Marquis MD

## 2019-02-19 NOTE — PROGRESS NOTES
Anil Gutierres Jr. is a 73 year old year old male patient here today for evaluation and managment of basal cell carcinoma on lip.  Patient reports the following modifying factors none.  Associated symptoms: none.  Patient has no other skin complaints today.  Remainder of the HPI, Meds, PMH, Allergies, FH, and SH was reviewed in chart.      Past Medical History:   Diagnosis Date     Basal cell carcinoma        History reviewed. No pertinent surgical history.     History reviewed. No pertinent family history.    Social History     Socioeconomic History     Marital status:      Spouse name: Not on file     Number of children: Not on file     Years of education: Not on file     Highest education level: Not on file   Social Needs     Financial resource strain: Not on file     Food insecurity - worry: Not on file     Food insecurity - inability: Not on file     Transportation needs - medical: Not on file     Transportation needs - non-medical: Not on file   Occupational History     Not on file   Tobacco Use     Smoking status: Never Smoker     Smokeless tobacco: Never Used   Substance and Sexual Activity     Alcohol use: Not on file     Drug use: Not on file     Sexual activity: Not on file   Other Topics Concern     Not on file   Social History Narrative     Not on file       Outpatient Encounter Medications as of 2019   Medication Sig Dispense Refill     aspirin 81 MG EC tablet Take 81 mg by mouth daily       atenolol (TENORMIN) 50 MG tablet Take 50 mg by mouth daily       Glucose Blood (BLOOD GLUCOSE TEST STRIPS) STRP by In Vitro route daily       levothyroxine (SYNTHROID/LEVOTHROID) 150 MCG tablet Take 150 mcg by mouth daily       lisinopril-hydrochlorothiazide (PRINZIDE/ZESTORETIC) 20-25 MG per tablet Take 1 tablet by mouth daily       simvastatin (ZOCOR) 40 MG tablet Take 40 mg by mouth At Bedtime       [] cephALEXin (KEFLEX) 500 MG capsule Take 1 capsule (500 mg) by mouth 4 times daily for 7 days  28 capsule 0     No facility-administered encounter medications on file as of 2/19/2019.              Review Of Systems  Skin: As above  Eyes: negative  Ears/Nose/Throat: negative  Respiratory: No shortness of breath, dyspnea on exertion, cough, or hemoptysis  Cardiovascular: negative  Gastrointestinal: negative  Genitourinary: negative  Musculoskeletal: negative  Neurologic: negative  Psychiatric: negative  Hematologic/Lymphatic/Immunologic: negative  Endocrine: negative      O:   NAD, WDWN, Alert & Oriented, Mood & Affect wnl, Vitals stable   Here today alone   /71   Pulse 57   SpO2 94%    General appearance normal   Vitals stable   Alert, oriented and in no acute distress     L upper cut lip 1.6cm pink pearly papule       Eyes: Conjunctivae/lids:Normal     ENT: Lips, buccal mucosa, tongue: normal    MSK:Normal    Cardiovascular: peripheral edema none    Pulm: Breathing Normal    Lymph Nodes: No Head and Neck Lymphadenopathy     Neuro/Psych: Orientation:Normal; Mood/Affect:Normal      A/P:  1. 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 Indication for Mohs was Location. Patient confirmed the site with Dr. Marquis.  After anesthesia with LEC, the tumor was excised using standard Mohs technique in 2 stages(s).  CLEAR MARGINS OBTAINED and Final defect size was 2.3 x 2.5 cm.     REPAIR WITH BUROW'S FLAP: Because of the Because of the size and full thickness nature of the defect, an advancement flap was planned. After LEC anesthesia and prep, the Burow's triangles were excised. One Burow's triangle was displaced laterally laong alar rim to hide incisions within skin relaxation lines. The advancement flap was raised by dissection in the deep subcutaneous plane. The remaining wound edges were undermined and hemostasis was obtained. The flap was advanced into the defect with care to avoid distortion and was sutured into place  in a layered fashion using Vicryl and Fast Absorbing sutures. Postoperative size was 4.5 x 3 cm.  EBL minimal; complications none; wound care routine.  The patient was discharged in good condition and will return in one week for wound evaluation.        BENIGN LESIONS DISCUSSED WITH PATIENT:  I discussed the specifics of tumor, prognosis, and genetics of benign lesions.  I explained that treatment of these lesions would be purely cosmetic and not medically neccessary.  I discussed with patient different removal options including excision, cautery and /or laser.      Nature and genetics of benign skin lesions dicussed with patient.  Signs and Symptoms of skin cancer discussed with patient.  Patient encouraged to perform monthly skin exams.  UV precautions reviewed with patient.  Patient to follow up with Primary Care provider regarding elevated blood pressure.  Skin care regimen reviewed with patient: Eliminate harsh soaps, i.e. Dial, zest, irsih spring; Mild soaps such as Cetaphil or Dove sensitive skin, avoid hot or cold showers, aggressive use of emollients including vanicream, cetaphil or cerave discussed with patient.    Risks of non-melanoma skin cancer discussed with patient   Return to clinic 3 months

## 2019-02-25 NOTE — PROGRESS NOTES
Surgical Office Location :   [facility]  [address] [place] [address]

## 2019-02-26 ENCOUNTER — OFFICE VISIT (OUTPATIENT)
Dept: DERMATOLOGY | Facility: CLINIC | Age: 74
End: 2019-02-26
Payer: COMMERCIAL

## 2019-02-26 ENCOUNTER — ALLIED HEALTH/NURSE VISIT (OUTPATIENT)
Dept: DERMATOLOGY | Facility: CLINIC | Age: 74
End: 2019-02-26
Payer: COMMERCIAL

## 2019-02-26 VITALS
SYSTOLIC BLOOD PRESSURE: 129 MMHG | BODY MASS INDEX: 36.96 KG/M2 | HEIGHT: 71 IN | DIASTOLIC BLOOD PRESSURE: 74 MMHG | HEART RATE: 60 BPM

## 2019-02-26 DIAGNOSIS — C44.219 BASAL CELL CARCINOMA (BCC) OF HELIX OF LEFT EAR: Primary | ICD-10-CM

## 2019-02-26 DIAGNOSIS — Z48.01 ENCOUNTER FOR CHANGE OR REMOVAL OF SURGICAL WOUND DRESSING: Primary | ICD-10-CM

## 2019-02-26 PROCEDURE — 17311 MOHS 1 STAGE H/N/HF/G: CPT | Mod: 79 | Performed by: DERMATOLOGY

## 2019-02-26 PROCEDURE — 15260 FTH/GFT FR N/E/E/L 20 SQCM/<: CPT | Mod: 79 | Performed by: DERMATOLOGY

## 2019-02-26 PROCEDURE — 99207 ZZC NO CHARGE NURSE ONLY: CPT

## 2019-02-26 NOTE — PROGRESS NOTES
Anil Gutierres Jr. is a 73 year old year old male patient here today for evaluation and managment of basal cell carcinoma on left helix.  Patient reports the following modifying factors none.  Associated symptoms: none.  Patient has no other skin complaints today.  Remainder of the HPI, Meds, PMH, Allergies, FH, and SH was reviewed in chart.      Past Medical History:   Diagnosis Date     Basal cell carcinoma        History reviewed. No pertinent surgical history.     History reviewed. No pertinent family history.    Social History     Socioeconomic History     Marital status:      Spouse name: Not on file     Number of children: Not on file     Years of education: Not on file     Highest education level: Not on file   Occupational History     Not on file   Social Needs     Financial resource strain: Not on file     Food insecurity:     Worry: Not on file     Inability: Not on file     Transportation needs:     Medical: Not on file     Non-medical: Not on file   Tobacco Use     Smoking status: Never Smoker     Smokeless tobacco: Never Used   Substance and Sexual Activity     Alcohol use: Not on file     Drug use: Not on file     Sexual activity: Not on file   Lifestyle     Physical activity:     Days per week: Not on file     Minutes per session: Not on file     Stress: Not on file   Relationships     Social connections:     Talks on phone: Not on file     Gets together: Not on file     Attends Rastafarian service: Not on file     Active member of club or organization: Not on file     Attends meetings of clubs or organizations: Not on file     Relationship status: Not on file     Intimate partner violence:     Fear of current or ex partner: Not on file     Emotionally abused: Not on file     Physically abused: Not on file     Forced sexual activity: Not on file   Other Topics Concern     Not on file   Social History Narrative     Not on file       Outpatient Encounter Medications as of 2/26/2019   Medication  "Sig Dispense Refill     aspirin 81 MG EC tablet Take 81 mg by mouth daily       atenolol (TENORMIN) 50 MG tablet Take 50 mg by mouth daily       Glucose Blood (BLOOD GLUCOSE TEST STRIPS) STRP by In Vitro route daily       levothyroxine (SYNTHROID/LEVOTHROID) 150 MCG tablet Take 150 mcg by mouth daily       lisinopril-hydrochlorothiazide (PRINZIDE/ZESTORETIC) 20-25 MG per tablet Take 1 tablet by mouth daily       simvastatin (ZOCOR) 40 MG tablet Take 40 mg by mouth At Bedtime       [] cephALEXin (KEFLEX) 500 MG capsule Take 1 capsule (500 mg) by mouth 4 times daily for 7 days 28 capsule 0     No facility-administered encounter medications on file as of 2019.              Review Of Systems  Skin: As above  Eyes: negative  Ears/Nose/Throat: negative  Respiratory: No shortness of breath, dyspnea on exertion, cough, or hemoptysis  Cardiovascular: negative  Gastrointestinal: negative  Genitourinary: negative  Musculoskeletal: negative  Neurologic: negative  Psychiatric: negative  Hematologic/Lymphatic/Immunologic: negative  Endocrine: negative      O:   NAD, WDWN, Alert & Oriented, Mood & Affect wnl, Vitals stable   Here today alone   /74   Pulse 60   Ht 1.803 m (5' 11\")   BMI 36.96 kg/m     General appearance normal   Vitals stable   Alert, oriented and in no acute distress     L helix 1cm pink pearly papule       Eyes: Conjunctivae/lids:Normal     ENT: Lips, buccal mucosa, tongue: normal    MSK:Normal    Cardiovascular: peripheral edema none    Pulm: Breathing Normal    Neuro/Psych: Orientation:Normal; Mood/Affect:Normal      A/P:  1. L helix basal cell carcinoma   MOHS:   Location    After PGACAC discussed with patient, decision for Mohs surgery was made. Indication for Mohs was Location. Patient confirmed the site with Dr. Marquis.  After anesthesia with LEC, the tumor was excised using standard Mohs technique in 1 stages(s).  CLEAR MARGINS OBTAINED and Final defect size was 1.2 x 1.4 cm. "       REPAIR FTSG FROM POSTAURICULAR: Because of the full-thickness nature of the defect and to avoid distortion, a full-thickness skin graft was planned. After LEC anesthesia and prep, a template was made of the defect and the graft was harvested from the ipsilateral post-auricular sulcus.  The graft was defatted and trimmed to fit the defect. It was sutured into place with Fast Absorbing Plain Gut suture and a taped Bolster dressing was applied.    The donor site was converted to a fusiform defect, and after hemostasis, was closed with subcutaneous stitches using Vicryl sutures.   EBL minimal; complications none; wound care routine.  The patient was discharged in good condition and will return on one week for wound evaluation.  BENIGN LESIONS DISCUSSED WITH PATIENT:  I discussed the specifics of tumor, prognosis, and genetics of benign lesions.  I explained that treatment of these lesions would be purely cosmetic and not medically neccessary.  I discussed with patient different removal options including excision, cautery and /or laser.      Nature and genetics of benign skin lesions dicussed with patient.  Signs and Symptoms of skin cancer discussed with patient.  Patient encouraged to perform monthly skin exams.  UV precautions reviewed with patient.  Patient to follow up with Primary Care provider regarding elevated blood pressure.  Skin care regimen reviewed with patient: Eliminate harsh soaps, i.e. Dial, zest, irsih spring; Mild soaps such as Cetaphil or Dove sensitive skin, avoid hot or cold showers, aggressive use of emollients including vanicream, cetaphil or cerave discussed with patient.    Risks of non-melanoma skin cancer discussed with patient   Return to clinic 6 months

## 2019-02-26 NOTE — LETTER
2/26/2019         RE: Anil Gutierres Jr.  [address] [address] [address]        Dear Colleague,    Thank you for referring your patient, Anil Gutierres Jr., to the [facility]. Please see a copy of my visit note below.    Surgical Office Location :   [facility]  [address] [place] [address]      Anil Gutierres Jr. is a 73 year old year old male patient here today for evaluation and managment of basal cell carcinoma on left helix.  Patient reports the following modifying factors none.  Associated symptoms: none.  Patient has no other skin complaints today.  Remainder of the HPI, Meds, PMH, Allergies, FH, and SH was reviewed in chart.      Past Medical History:   Diagnosis Date     Basal cell carcinoma        History reviewed. No pertinent surgical history.     History reviewed. No pertinent family history.    Social History     Socioeconomic History     Marital status:      Spouse name: Not on file     Number of children: Not on file     Years of education: Not on file     Highest education level: Not on file   Occupational History     Not on file   Social Needs     Financial resource strain: Not on file     Food insecurity:     Worry: Not on file     Inability: Not on file     Transportation needs:     Medical: Not on file     Non-medical: Not on file   Tobacco Use     Smoking status: Never Smoker     Smokeless tobacco: Never Used   Substance and Sexual Activity     Alcohol use: Not on file     Drug use: Not on file     Sexual activity: Not on file   Lifestyle     Physical activity:     Days per week: Not on file     Minutes per session: Not on file     Stress: Not on file   Relationships     Social connections:     Talks on phone: Not on file     Gets together: Not on file     Attends Christianity service: Not on file     Active member of club or organization: Not on file     Attends meetings of clubs or organizations: Not on file     Relationship status: Not  "on file     Intimate partner violence:     Fear of current or ex partner: Not on file     Emotionally abused: Not on file     Physically abused: Not on file     Forced sexual activity: Not on file   Other Topics Concern     Not on file   Social History Narrative     Not on file       Outpatient Encounter Medications as of 2019   Medication Sig Dispense Refill     aspirin 81 MG EC tablet Take 81 mg by mouth daily       atenolol (TENORMIN) 50 MG tablet Take 50 mg by mouth daily       Glucose Blood (BLOOD GLUCOSE TEST STRIPS) STRP by In Vitro route daily       levothyroxine (SYNTHROID/LEVOTHROID) 150 MCG tablet Take 150 mcg by mouth daily       lisinopril-hydrochlorothiazide (PRINZIDE/ZESTORETIC) 20-25 MG per tablet Take 1 tablet by mouth daily       simvastatin (ZOCOR) 40 MG tablet Take 40 mg by mouth At Bedtime       [] cephALEXin (KEFLEX) 500 MG capsule Take 1 capsule (500 mg) by mouth 4 times daily for 7 days 28 capsule 0     No facility-administered encounter medications on file as of 2019.              Review Of Systems  Skin: As above  Eyes: negative  Ears/Nose/Throat: negative  Respiratory: No shortness of breath, dyspnea on exertion, cough, or hemoptysis  Cardiovascular: negative  Gastrointestinal: negative  Genitourinary: negative  Musculoskeletal: negative  Neurologic: negative  Psychiatric: negative  Hematologic/Lymphatic/Immunologic: negative  Endocrine: negative      O:   NAD, WDWN, Alert & Oriented, Mood & Affect wnl, Vitals stable   Here today alone   /74   Pulse 60   Ht 1.803 m (5' 11\")   BMI 36.96 kg/m      General appearance normal   Vitals stable   Alert, oriented and in no acute distress     L helix 1cm pink pearly papule       Eyes: Conjunctivae/lids:Normal     ENT: Lips, buccal mucosa, tongue: normal    MSK:Normal    Cardiovascular: peripheral edema none    Pulm: Breathing Normal    Neuro/Psych: Orientation:Normal; Mood/Affect:Normal      A/P:  1. L helix basal cell " carcinoma   MOHS:   Location    After PGACAC discussed with patient, decision for Mohs surgery was made. Indication for Mohs was Location. Patient confirmed the site with Dr. Marquis.  After anesthesia with LEC, the tumor was excised using standard Mohs technique in 1 stages(s).  CLEAR MARGINS OBTAINED and Final defect size was 1.2 x 1.4 cm.       REPAIR FTSG FROM POSTAURICULAR: Because of the full-thickness nature of the defect and to avoid distortion, a full-thickness skin graft was planned. After LEC anesthesia and prep, a template was made of the defect and the graft was harvested from the ipsilateral post-auricular sulcus.  The graft was defatted and trimmed to fit the defect. It was sutured into place with Fast Absorbing Plain Gut suture and a taped Bolster dressing was applied.    The donor site was converted to a fusiform defect, and after hemostasis, was closed with subcutaneous stitches using Vicryl sutures.   EBL minimal; complications none; wound care routine.  The patient was discharged in good condition and will return on one week for wound evaluation.  BENIGN LESIONS DISCUSSED WITH PATIENT:  I discussed the specifics of tumor, prognosis, and genetics of benign lesions.  I explained that treatment of these lesions would be purely cosmetic and not medically neccessary.  I discussed with patient different removal options including excision, cautery and /or laser.      Nature and genetics of benign skin lesions dicussed with patient.  Signs and Symptoms of skin cancer discussed with patient.  Patient encouraged to perform monthly skin exams.  UV precautions reviewed with patient.  Patient to follow up with Primary Care provider regarding elevated blood pressure.  Skin care regimen reviewed with patient: Eliminate harsh soaps, i.e. Dial, zest, irsih spring; Mild soaps such as Cetaphil or Dove sensitive skin, avoid hot or cold showers, aggressive use of emollients including vanicream, cetaphil or cerave  discussed with patient.    Risks of non-melanoma skin cancer discussed with patient   Return to clinic 6 months      Again, thank you for allowing me to participate in the care of your patient.        Sincerely,        Martin Marquis MD

## 2019-02-26 NOTE — NURSING NOTE
"Chief Complaint   Patient presents with     Derm Problem     mohs 2 of 2       Vitals:    02/26/19 0757   BP: 129/74   Pulse: 60   Height: 1.803 m (5' 11\")     Wt Readings from Last 1 Encounters:   11/06/18 120.2 kg (265 lb)       Macrina Mcfadden LPN.................2/26/2019    "

## 2019-02-26 NOTE — NURSING NOTE
Pt returned to clinic for post surgery 1 week follow up bandage change. Pt has no complaints, denies pain. Bandage removed from left upper lip, area cleansed with normal saline. Site is healing and wound edges approximating well. Reapplied new steri strips and paper tape.    Advised to watch for signs/sx of infection; spreading redness, drainage, odor, fever. Call or report promptly to clinic. Pt given written instructions and informed to rtc as needed. Patient verbalized understanding.     Macrina Mcfadden LPN.................2/26/2019

## 2019-02-26 NOTE — PATIENT INSTRUCTIONS
WOUND CARE INSTRUCTIONS  for  ONE WEEK AFTER SURGERY              lip    1) Leave flat bandage on your skin for one week after today s bandage change.  2) In one week when you remove the bandage, you may resume your regular skin care routine, including washing with mild soap and water, applying moisturizer, make-up and sunscreen.    3) If there are any open or bleeding areas at the incision/graft site you should begin to cover the area with a bandage daily as follows:    1) Clean and dry the area with plain tap water using a Q-tip or sterile gauze pad.  2) Apply Polysporin or Bacitracin ointment to the open area.  3) Cover the wound with a band-aid or a sterile non-stick gauze pad and micropore paper tape.         SIGNS OF INFECTION  - If you notice any of these signs of infection, call your doctor right away: expanding redness around the wound.  - Yellow or greenish-colored pus or cloudy wound drainage.    - Red streaking spreading from the wound.  - Increased swelling, tenderness, or pain around the wound.   - Fever.    Please remember that yellow and clear drainage from a wound can be normal and related to normal wound healing.  Isolated drainage from a wound without a combination of the above features does not indicate infection.       *Once the bandages are removed, the scar will be red and firm (especially in the lip/chin area). This is normal and will fade in time. It might take 6-12 months for this to happen.     *Massaging the area will help the scar soften and fade quicker. Begin to massage the area one month after the bandages have been removed. To massage apply pressure directly and firmly over the scar with the fingertips and move in a circular motion. Massage the area for a few minutes several times a day. Continue to massage the site for several months.    *Approximately 6-8 weeks after surgery it is not uncommon to see the formation of  tender pimple-like  bump along the scar. This is normal. As the  scar continues to mature and the stitches underneath the skin begin to dissolve, this might occur. Do not pick or squeeze, this will resolve on it s own. Should one break open producing a small amount of drainage, apply Polysporin or Bacitracin ointment a few times a day until the wound is completely healed.    *Numbness in the surgical area is expected. It might take 12-18 months for the feeling to return to normal. During this time sensations of itchiness, tingling and occasional sharp pains might be noted. These feelings are normal and will subside once the nerves have completely healed.     IN CASE OF EMERGENCY: Dr Marquis [phone]     If you were seen in [place] call: [phone]    If you were seen in [place] call: [phone]    Skin Graft Wound Care     Dermatology Clinic [phone]   Left ear    ? No strenuous activity for 48 hours. Resume moderate activity in 48 hours.  No heavy exercising until you are seen for follow up in one week.    ? Take Tylenol as needed for discomfort.                       ? No alcoholic beverages for 48 hours.    ? Leave the bandage in place until you come in for follow up in one week.  If the bandage becomes blood tinged or loose, reinforce it with gauze and tape.     ? Keep the bandage dry. Wash around it carefully.    ? If the tape becomes soiled or starts to come off, reinforce it with additional paper tape.    ? Do not smoke for 3 weeks; smoking is detrimental to wound healing and may cause the graft to die.    ? Avoid prolonged exposure to extremely cold temperatures for 3 weeks.    ? It is normal to have swelling and bruising around the surgical site. The bruising will fade in approximately 10-14 days. Elevate the area to reduce swelling.    ? Numbness, itchiness and sensitivity to temperature changes can occur after surgery and may take up to 18 months to normalize.    POSSIBLE COMPLICATIONS    BLEEDIN. Leave the bandage in place.  2. Use tightly  rolled up gauze or a cloth to apply direct pressure over the bandage for 20 minutes.  3. Reapply pressure for an additional 20 minutes if necessary  4. Call the office or go to the nearest emergency room if pressure fails to stop the bleeding.  5. Use additional gauze and tape to maintain pressure once the bleeding has stopped.    PAIN:    1. Post operative pain should slowly get better, beginning the evening after surgery.  2. A sudden or severe increase in pain may indicate a problem. Call the office if this occurs.    In case of emergency phone: Dermatology Clinic: [phone]   or Dr Marquis [phone]

## 2019-02-26 NOTE — PATIENT INSTRUCTIONS
WOUND CARE INSTRUCTIONS  for  ONE WEEK AFTER SURGERY    Left upper lip      1) Leave flat bandage on your skin for one week after today s bandage change.  2) In one week when you remove the bandage, you may resume your regular skin care routine, including washing with mild soap and water, applying moisturizer, make-up and sunscreen.    3) If there are any open or bleeding areas at the incision/graft site you should begin to cover the area with a bandage daily as follows:    1) Clean and dry the area with plain tap water using a Q-tip or sterile gauze pad.  2) Apply Polysporin or Bacitracin ointment to the open area.  3) Cover the wound with a band-aid or a sterile non-stick gauze pad and micropore paper tape.         SIGNS OF INFECTION  - If you notice any of these signs of infection, call your doctor right away: expanding redness around the wound.  - Yellow or greenish-colored pus or cloudy wound drainage.    - Red streaking spreading from the wound.  - Increased swelling, tenderness, or pain around the wound.   - Fever.    Please remember that yellow and clear drainage from a wound can be normal and related to normal wound healing.  Isolated drainage from a wound without a combination of the above features does not indicate infection.       *Once the bandages are removed, the scar will be red and firm (especially in the lip/chin area). This is normal and will fade in time. It might take 6-12 months for this to happen.     *Massaging the area will help the scar soften and fade quicker. Begin to massage the area one month after the bandages have been removed. To massage apply pressure directly and firmly over the scar with the fingertips and move in a circular motion. Massage the area for a few minutes several times a day. Continue to massage the site for several months.    *Approximately 6-8 weeks after surgery it is not uncommon to see the formation of  tender pimple-like  bump along the scar. This is normal. As  the scar continues to mature and the stitches underneath the skin begin to dissolve, this might occur. Do not pick or squeeze, this will resolve on it s own. Should one break open producing a small amount of drainage, apply Polysporin or Bacitracin ointment a few times a day until the wound is completely healed.    *Numbness in the surgical area is expected. It might take 12-18 months for the feeling to return to normal. During this time sensations of itchiness, tingling and occasional sharp pains might be noted. These feelings are normal and will subside once the nerves have completely healed.         IN CASE OF EMERGENCY: Dr Marquis [phone]     If you were seen in [place] call: [phone]    If you were seen in [place] call: [phone]

## 2019-03-05 ENCOUNTER — ALLIED HEALTH/NURSE VISIT (OUTPATIENT)
Dept: DERMATOLOGY | Facility: CLINIC | Age: 74
End: 2019-03-05
Payer: COMMERCIAL

## 2019-03-05 DIAGNOSIS — Z48.01 ENCOUNTER FOR CHANGE OR REMOVAL OF SURGICAL WOUND DRESSING: Primary | ICD-10-CM

## 2019-03-05 PROCEDURE — 99207 ZZC NO CHARGE NURSE ONLY: CPT

## 2019-03-05 NOTE — PROGRESS NOTES
Pt returned to clinic for post surgery 1 week follow up bandage change. Pt has no complaints, denies pain. Bandage removed from right ear, area cleansed with normal saline. Site is healing and wound edges approximating well.    Advised to watch for signs/sx of infection; spreading redness, drainage, odor, fever. Call or report promptly to clinic. Pt given written instructions and informed to rtc as needed. Patient verbalized understanding.     Rupinder Cristina, CMA

## 2019-03-05 NOTE — PATIENT INSTRUCTIONS
ONE WEEK DRESSING CHANGE  for  SKIN GRAFTS  The following information has been compiled to offer you assistance with the dressing change or wound evaluation. Please feel free to call our office to speak with one of the nurses if you have any questions or concerns about the progress of the wound healing process especially if there are any signs of graft necrosis or infection. We will be happy to answer any questions you might have.                                                               AFTER 24 HOURS YOU SHOULD REMOVE THE BANDAGE AND BEGIN DAILY DRESSING CHANGES AS FOLLOWS:     1) Remove Dressing.     2) Clean and dry the area with tap water using a Q-tip or sterile gauze pad.     3) Apply Vaseline, Polysporin ointment, Aquaphor or Bacitracin ointment over entire wound.  Do NOT use Neosporin ointment.     4) Cover the wound with a band-aid, or a sterile non-stick gauze pad and micropore paper tape      REPEAT THESE INSTRUCTIONS AT LEAST ONCE A DAY UNTIL THE WOUND HAS COMPLETELY HEALED. DO NOT LET THE WOUND SCAB OVER.    It is an old wives tale that a wound heals better when it is exposed to air and allowed to dry out. The wound will heal faster with a better cosmetic result if it is kept moist with ointment and covered with a bandage.       Massaging the wound site hastens the healing process by softening the scar tissue and fading the scar. Begin massaging the area one month after surgery as often as possible. Continue to massage the area for 2-3 months or until you feel the scar tissue has softened. Moisturizers can be used during the massaging but are not necessary. Ultimately it takes six months for the graft to heal and blend into the surrounding skin.

## 2020-03-11 ENCOUNTER — HEALTH MAINTENANCE LETTER (OUTPATIENT)
Age: 75
End: 2020-03-11

## 2021-01-03 ENCOUNTER — HEALTH MAINTENANCE LETTER (OUTPATIENT)
Age: 76
End: 2021-01-03

## 2021-02-22 ENCOUNTER — OFFICE VISIT (OUTPATIENT)
Dept: DERMATOLOGY | Facility: CLINIC | Age: 76
End: 2021-02-22
Payer: COMMERCIAL

## 2021-02-22 VITALS — DIASTOLIC BLOOD PRESSURE: 72 MMHG | RESPIRATION RATE: 16 BRPM | HEART RATE: 52 BPM | SYSTOLIC BLOOD PRESSURE: 176 MMHG

## 2021-02-22 DIAGNOSIS — I87.2 VENOUS STASIS DERMATITIS OF RIGHT LOWER EXTREMITY: ICD-10-CM

## 2021-02-22 DIAGNOSIS — L50.3 DERMATOGRAPHIA: Primary | ICD-10-CM

## 2021-02-22 PROCEDURE — 99213 OFFICE O/P EST LOW 20 MIN: CPT | Performed by: DERMATOLOGY

## 2021-02-22 RX ORDER — FLUOCINONIDE 0.5 MG/G
CREAM TOPICAL 2 TIMES DAILY
Qty: 120 G | Refills: 3 | Status: SHIPPED | OUTPATIENT
Start: 2021-02-22 | End: 2022-04-18

## 2021-02-22 NOTE — NURSING NOTE
"Initial BP (!) 176/72 (BP Location: Right arm, Patient Position: Sitting, Cuff Size: Adult Large)   Pulse 52   Resp 16  Estimated body mass index is 36.96 kg/m  as calculated from the following:    Height as of 2/26/19: 1.803 m (5' 11\").    Weight as of 11/6/18: 120.2 kg (265 lb). .    Ana Price, [facility]    "

## 2021-02-22 NOTE — PROGRESS NOTES
Anil Gutierres Jr. is an extremely pleasant 75 year old year old male patient here today for rash on right leg and arm.   .   Patient states this has been present for a while.  Patient reports the following symptoms:  itching.  Patient reports the following previous treatments none.  These treatments did not work.  Patient reports the following modifying factors none.  Associated symptoms: had r hip surgery and leg has been swelling.  Patient has no other skin complaints today.  Remainder of the HPI, Meds, PMH, Allergies, FH, and SH was reviewed in chart.      Past Medical History:   Diagnosis Date     Basal cell carcinoma        No past surgical history on file.     No family history on file.    Social History     Socioeconomic History     Marital status:      Spouse name: Not on file     Number of children: Not on file     Years of education: Not on file     Highest education level: Not on file   Occupational History     Not on file   Social Needs     Financial resource strain: Not on file     Food insecurity     Worry: Not on file     Inability: Not on file     Transportation needs     Medical: Not on file     Non-medical: Not on file   Tobacco Use     Smoking status: Never Smoker     Smokeless tobacco: Never Used   Substance and Sexual Activity     Alcohol use: Not on file     Drug use: Not on file     Sexual activity: Not on file   Lifestyle     Physical activity     Days per week: Not on file     Minutes per session: Not on file     Stress: Not on file   Relationships     Social connections     Talks on phone: Not on file     Gets together: Not on file     Attends Jew service: Not on file     Active member of club or organization: Not on file     Attends meetings of clubs or organizations: Not on file     Relationship status: Not on file     Intimate partner violence     Fear of current or ex partner: Not on file     Emotionally abused: Not on file     Physically abused: Not on file     Forced  sexual activity: Not on file   Other Topics Concern     Not on file   Social History Narrative     Not on file       Outpatient Encounter Medications as of 2/22/2021   Medication Sig Dispense Refill     aspirin 81 MG EC tablet Take 81 mg by mouth daily       atenolol (TENORMIN) 50 MG tablet Take 50 mg by mouth daily       fluocinonide (LIDEX) 0.05 % external cream Apply topically 2 times daily 120 g 3     Glucose Blood (BLOOD GLUCOSE TEST STRIPS) STRP by In Vitro route daily       levothyroxine (SYNTHROID/LEVOTHROID) 150 MCG tablet Take 150 mcg by mouth daily       lisinopril-hydrochlorothiazide (PRINZIDE/ZESTORETIC) 20-25 MG per tablet Take 1 tablet by mouth daily       simvastatin (ZOCOR) 40 MG tablet Take 40 mg by mouth At Bedtime       No facility-administered encounter medications on file as of 2/22/2021.              Review Of Systems  Skin: As above  Eyes: negative  Ears/Nose/Throat: negative  Respiratory: No shortness of breath, dyspnea on exertion, cough, or hemoptysis  Cardiovascular: negative  Gastrointestinal: negative  Genitourinary: negative  Musculoskeletal: negative  Neurologic: negative  Psychiatric: negative  Hematologic/Lymphatic/Immunologic: negative  Endocrine: negative      O:   NAD, WDWN, Alert & Oriented, Mood & Affect wnl, Vitals stable   Here today alone   BP (!) 176/72 (BP Location: Right arm, Patient Position: Sitting, Cuff Size: Adult Large)   Pulse 52   Resp 16    General appearance normal   Vitals stable   Alert, oriented and in no acute distress     R lower leg 2+ edema stasis changes and asteatotic plaques  dermatographia on arms       The remainder of expanded problem focused exam was normal; the following areas were examined:  conjunctiva/lids, face, neck, , chest, digits/nails, RUE, LUE.      Eyes: Conjunctivae/lids:Normal     ENT: Lips, buccal mucosa, tongue: normal    MSK:Normal    Cardiovascular: peripheral edema 2+    Pulm: Breathing Normal    Neuro/Psych: Orientation:Alert  and Orientedx3 ; Mood/Affect:normal       A/P:  1. Dermatographia, stasis and asteatotic derm   It was a pleasure speaking to Anil Gutierres Jr. Today.  Skin care discussed with patient   Edema clinic referral  claritin in am  Zyrtec bedtime  To reduce swelling in the leg   Don't stand for long periods.   Take regular walks.   Elevate your feet when sitting: if your legs are swollen they need to be above your hips to drain effectively.   Elevate the foot of your bed overnight.   Once the dermatitis is under control, wear special graduated compression stockings long term.    To treat the dermatitis   Dry up oozing patches with dilute vinegar on gauze as compresses.   Topical lidex twice daily daily  Return to clinic 4 weeks  Use vanicream daily  Try not to scratch: it keeps the dermatitis going.   Protect your skin from injury: this can result in infection or ulceration.  Vinegar is 5% acetic acid. Make a 1% solution by adding   cup of vinegar (white or brown) to 1 pint of water.    Previous clinic  notes and pertinent laboratory tests were reviewed prior to Anil Gutierres Jr.'s visit.  Patient to follow up with Primary Care provider regarding elevated blood pressure.  Skin care regimen reviewed with patient: Eliminate harsh soaps, i.e. Dial, zest, irsih spring; Mild soaps such as Cetaphil or Dove sensitive skin, avoid hot or cold showers, aggressive use of emollients including vanicream, cetaphil or cerave discussed with patient.    Return to clinic 4 weeks

## 2021-02-22 NOTE — LETTER
2/22/2021         RE: Anil Gutierres Jr.  [address] [address] [address]        Dear Colleague,    Thank you for referring your patient, Anil Gutierres Jr., to the [facility]. Please see a copy of my visit note below.    Anil Gutierres Jr. is an extremely pleasant 75 year old year old male patient here today for rash on right leg and arm.   .   Patient states this has been present for a while.  Patient reports the following symptoms:  itching.  Patient reports the following previous treatments none.  These treatments did not work.  Patient reports the following modifying factors none.  Associated symptoms: had r hip surgery and leg has been swelling.  Patient has no other skin complaints today.  Remainder of the HPI, Meds, PMH, Allergies, FH, and SH was reviewed in chart.      Past Medical History:   Diagnosis Date     Basal cell carcinoma        No past surgical history on file.     No family history on file.    Social History     Socioeconomic History     Marital status:      Spouse name: Not on file     Number of children: Not on file     Years of education: Not on file     Highest education level: Not on file   Occupational History     Not on file   Social Needs     Financial resource strain: Not on file     Food insecurity     Worry: Not on file     Inability: Not on file     Transportation needs     Medical: Not on file     Non-medical: Not on file   Tobacco Use     Smoking status: Never Smoker     Smokeless tobacco: Never Used   Substance and Sexual Activity     Alcohol use: Not on file     Drug use: Not on file     Sexual activity: Not on file   Lifestyle     Physical activity     Days per week: Not on file     Minutes per session: Not on file     Stress: Not on file   Relationships     Social connections     Talks on phone: Not on file     Gets together: Not on file     Attends Muslim service: Not on file     Active member of club or organization: Not on file      Attends meetings of clubs or organizations: Not on file     Relationship status: Not on file     Intimate partner violence     Fear of current or ex partner: Not on file     Emotionally abused: Not on file     Physically abused: Not on file     Forced sexual activity: Not on file   Other Topics Concern     Not on file   Social History Narrative     Not on file       Outpatient Encounter Medications as of 2/22/2021   Medication Sig Dispense Refill     aspirin 81 MG EC tablet Take 81 mg by mouth daily       atenolol (TENORMIN) 50 MG tablet Take 50 mg by mouth daily       fluocinonide (LIDEX) 0.05 % external cream Apply topically 2 times daily 120 g 3     Glucose Blood (BLOOD GLUCOSE TEST STRIPS) STRP by In Vitro route daily       levothyroxine (SYNTHROID/LEVOTHROID) 150 MCG tablet Take 150 mcg by mouth daily       lisinopril-hydrochlorothiazide (PRINZIDE/ZESTORETIC) 20-25 MG per tablet Take 1 tablet by mouth daily       simvastatin (ZOCOR) 40 MG tablet Take 40 mg by mouth At Bedtime       No facility-administered encounter medications on file as of 2/22/2021.              Review Of Systems  Skin: As above  Eyes: negative  Ears/Nose/Throat: negative  Respiratory: No shortness of breath, dyspnea on exertion, cough, or hemoptysis  Cardiovascular: negative  Gastrointestinal: negative  Genitourinary: negative  Musculoskeletal: negative  Neurologic: negative  Psychiatric: negative  Hematologic/Lymphatic/Immunologic: negative  Endocrine: negative      O:   NAD, WDWN, Alert & Oriented, Mood & Affect wnl, Vitals stable   Here today alone   BP (!) 176/72 (BP Location: Right arm, Patient Position: Sitting, Cuff Size: Adult Large)   Pulse 52   Resp 16    General appearance normal   Vitals stable   Alert, oriented and in no acute distress     R lower leg 2+ edema stasis changes and asteatotic plaques  dermatographia on arms       The remainder of expanded problem focused exam was normal; the following areas were examined:   conjunctiva/lids, face, neck, , chest, digits/nails, RUE, LUE.      Eyes: Conjunctivae/lids:Normal     ENT: Lips, buccal mucosa, tongue: normal    MSK:Normal    Cardiovascular: peripheral edema 2+    Pulm: Breathing Normal    Neuro/Psych: Orientation:Alert and Orientedx3 ; Mood/Affect:normal       A/P:  1. Dermatographia, stasis and asteatotic derm   It was a pleasure speaking to Anil Gutierres Jr. Today.  Skin care discussed with patient   Edema clinic referral  claritin in am  Zyrtec bedtime  To reduce swelling in the leg   Don't stand for long periods.   Take regular walks.   Elevate your feet when sitting: if your legs are swollen they need to be above your hips to drain effectively.   Elevate the foot of your bed overnight.   Once the dermatitis is under control, wear special graduated compression stockings long term.    To treat the dermatitis   Dry up oozing patches with dilute vinegar on gauze as compresses.   Topical lidex twice daily daily  Return to clinic 4 weeks  Use vanicream daily  Try not to scratch: it keeps the dermatitis going.   Protect your skin from injury: this can result in infection or ulceration.  Vinegar is 5% acetic acid. Make a 1% solution by adding   cup of vinegar (white or brown) to 1 pint of water.    Previous clinic  notes and pertinent laboratory tests were reviewed prior to Anil Gutierres Jr.'s visit.  Patient to follow up with Primary Care provider regarding elevated blood pressure.  Skin care regimen reviewed with patient: Eliminate harsh soaps, i.e. Dial, zest, irsih spring; Mild soaps such as Cetaphil or Dove sensitive skin, avoid hot or cold showers, aggressive use of emollients including vanicream, cetaphil or cerave discussed with patient.    Return to clinic 4 weeks      Again, thank you for allowing me to participate in the care of your patient.        Sincerely,        Martin Marquis MD

## 2021-02-22 NOTE — PATIENT INSTRUCTIONS
Vinegar is 5% acetic acid. Make a 1% solution by adding   cup of vinegar (white or brown) to 1 pint of water.    Take 1 Claritin in the morning.  Take 1 Zyrtec in the evening.  The edema clinic will call you to schedule an appointment.   Use prescription cream as directed.   Use Vinegar wash, recipe above, to wash legs.    Proper skin care from Dr. Marquis- [facility]     Eliminate harsh soaps, i.e. Dial, Zest, [place]ro Spring;   Use mild soaps such as Cetaphil or Dove Sensitive Skin   Avoid hot or cold showers   After showering, lightly dry off.    Aggressive use of a moisturizer (including Vanicream, Cetaphil, Aquaphor or Cerave)   We recommend using a tub that needs to be scooped out, not a pump. This has more of an oil base. It will hold moisture in your skin much better than a water base moisturizer. The ones recommended are non- pore clogging.       If you have any questions call [phone] and follow the prompts to Dr. Marquis's office.

## 2021-03-09 ENCOUNTER — HOSPITAL ENCOUNTER (OUTPATIENT)
Dept: PHYSICAL THERAPY | Facility: CLINIC | Age: 76
Setting detail: THERAPIES SERIES
End: 2021-03-09
Attending: DERMATOLOGY
Payer: COMMERCIAL

## 2021-03-09 DIAGNOSIS — L50.3 DERMATOGRAPHIA: ICD-10-CM

## 2021-03-09 DIAGNOSIS — I87.2 VENOUS STASIS DERMATITIS OF RIGHT LOWER EXTREMITY: ICD-10-CM

## 2021-03-09 PROCEDURE — 97140 MANUAL THERAPY 1/> REGIONS: CPT | Mod: GP | Performed by: PHYSICAL THERAPIST

## 2021-03-09 PROCEDURE — 97161 PT EVAL LOW COMPLEX 20 MIN: CPT | Mod: GP | Performed by: PHYSICAL THERAPIST

## 2021-03-09 NOTE — PROGRESS NOTES
"Outpatient Lymphedema Therapy Evaluation       03/09/21 0800   Rehab Discipline   Discipline PT   Type of Visit   Type of visit Initial Edema Evaluation       present No   General Information   Start of care 03/09/21   Referring physician Dr. Kandis MD   Orders Evaluate and treat as indicated   Order date 02/22/21   Medical diagnosis BLE secondary lymphedema s/p R-SAMMI with stasis changes (RLE>LLE)   Edema onset 12/23/20  (date of R-SAMMI)   Affected body parts RLE;LLE  (R>L)   Edema etiology Chronic Venous Insufficiency;Surgery;SAMMI   Edema etiology comments R-SAMMI on 12/23/2020; per Dr. Marquis's note on 2/22/21 RLE with stasis changes and \"asteatotic plaques\"; herniated disc between L4-L5 ~2 weeks ago; L-knee arthritis (needs a L-TKA; delayed due to COVID)   Pertinent history of current problem (PT: include personal factors and/or comorbidities that impact the POC; OT: include additional occupational profile info) pt reports leg swelling \"on and off for awhile\"; at least since September 2020 that his PCP noted, tried lasix but didn't notice much of a difference; pt reports edema \"comes and goes\" for awhile but more dramatic since surgery; reports swelling is about mid-calf down with most of edema at ankle and foot (R>L); has compression stockings hasn't worn since surgery due to inability to get on (from drug store)   Surgical / medical history reviewed Yes   Prior level of functional mobility mod-I ambulation into clinic using SEC (since hip surgery)   Prior treatment Diuretics;Compression garments  (not much difference with either)   Patient role / employment history Retired   Living environment comments lives with wife (also retired)   Current assistive devices Standard cane   Assistive device comments uses all the times since SAMMI   Fall Risk Screen   Fall screen completed by PT   Have you fallen 2 or more times in the past year? No   Have you fallen and had an injury in the past year? No " "  Is patient a fall risk? No   Abuse Screen (yes response referral indicated)   Feels Unsafe at Home or Work/School no   Feels Threatened by Someone no   Does Anyone Try to Keep You From Having Contact with Others or Doing Things Outside Your Home? no   Physical Signs of Abuse Present no   System Outcome Measures   Outcome Measures Lymphedema   Lymphedema Life Impact Scale (score range 0-72). A higher score indicates greater impairment. 11   Subjective Report   Patient report of symptoms R>L heaviness and big feet; stiffness and tightness   Precautions   Precautions comments no precautions   Patient / Family Goals   Patient / family goals statement to control the swelling and hopefully it doesn't come back   Pain   Patient currently in pain No   Pain comments more stiffness or tightness but not true pain   Cognitive Status   Orientation Orientation to person, place and time   Level of consciousness Alert   Follows commands and answers questions 100% of the time   Personal safety and judgement Intact   Memory Intact   Edema Exam / Assessment   Skin condition Pitting;Intact   Skin condition comments RLE with stasis changes and asteatotic plaques (pt actually reports two spots on anterior shin are from previous blisters that he popped and are now healed\"); BLE skin intact with RLE: 3+ pitting at feet up to mid-shin and on LLE: 2+ pitting foot to mid-shin;  1+ B to knees   Scar Yes   Location R-SAMMI   Capillary refill Symmetrical   Dorsal pedal pulse Symmetrical   Stemmer sign Negative   Ulceration No   Girth Measurements   Girth Measurements Refer to separate girth measurement flowsheet   Volume LE   Right LE (mL) 4190.73   Left LE (mL) 4185.87   LE volume comparison RLE volume greater than LLE volume   % difference +0.1%   Range of Motion   ROM No deficits were identified   Strength   Strength No deficits were identified   Posture   Posture Normal   Palpation   Palpation denies hypersensitivities   Sensory   Sensory " perception Light touch   Light touch Intact   Sensory perception comments denies any numbness/tingling despite being a diabetic (type 2)   Coordination   Coordination Gross motor coordination appropriate   Muscle Tone   Muscle tone No deficits were identified   Planned Edema Interventions   Planned edema interventions Gradient compression bandaging;Fit for compression garment;Exercises;Precautions to prevent infection / exacerbation;Education;Manual therapy;Skin care / precautions;Home management program development   Clinical Impression   Criteria for skilled therapeutic intervention met Yes   Therapy diagnosis BLE secondary lymphedema with venous stasis (R>L)   Influenced by the following impairments / conditions Stage 2;Phlebolymphedema   Functional limitations due to impairments / conditions tight fitting socks and shoes in BLE   Clinical Presentation Stable/Uncomplicated   Clinical Presentation Rationale clinical judgement; LLIS; no weeping/wounds present   Clinical Decision Making (Complexity) Low complexity   Treatment Frequency 2x/week  (wife to wrap at home as well)   Treatment duration 12 weeks   Patient / family and/or staff in agreement with plan of care Yes   Risks and benefits of therapy have been explained Yes   Education Assessment   Preferred learning style Listening   Barriers to learning No barriers   Goals   Edema Eval Goals 1;2;3;4;5   Goal 1   Goal identifier stg   Goal description pt to have around the clock tolerance to BLE GCB for edema reduction response   Target date 03/23/21   Goal 2   Goal identifier stg   Goal description pt's wife to be independent with BLE GCB for edema reducion response   Target date 03/23/21   Goal 3   Goal identifier ltg   Goal description once appropriate, pt and /or wife to be independent with donning, doffing and care of compression garments for longterm edema management for maintenance   Target date 06/07/21   Goal 4   Goal identifier ltg   Goal description pt  to be independent with longterm edema management via HEP, elevation, skin cares and compression garment wear/use   Target date 06/07/21   Goal 5   Goal identifier ltg   Goal description pt to have at least 3 point improvement on LLIS due to decreased edema and associated symptoms in BLEs   Target date 06/07/21   Total Evaluation Time   PT Rabiaal, Low Complexity Minutes (40634) 5

## 2021-03-09 NOTE — PROGRESS NOTES
[facility]        OUTPATIENT PHYSICAL THERAPY EDEMA EVALUATION  PLAN OF TREATMENT FOR OUTPATIENT REHABILITATION  (COMPLETE FOR INITIAL CLAIMS ONLY)  Patient's Last Name, First Name, Anil Davis                           Provider s Name:   [facility] Medical Record No.  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 Goal description: pt to have around the clock tolerance to BLE GCB for edema reduction response       Target date: 03/23/21  2. Goal description: pt's wife to be independent with BLE GCB for edema reducion response       Target date: 03/23/21  3. Goal description: once appropriate, pt and /or wife to be independent with donning, doffing and care of compression garments for longterm edema management for maintenance       Target date: 06/07/21  4. Goal description: pt to be independent with longterm edema management via HEP, elevation, skin cares and compression garment wear/use       Target date: 06/07/21  5. Goal description: pt to have at least 3 point improvement on LLIS due to decreased edema and associated symptoms in BLEs       Target date: 06/07/21    Treatment Frequency: 2x/week(wife to wrap at home as well)   Treatment duration: 12 weeks (3/9/21 - 6/7/21)    Misti Patterson, PT, DPT, CLT                                     I CERTIFY THE NEED FOR THESE SERVICES FURNISHED UNDER        THIS PLAN OF TREATMENT AND WHILE UNDER MY CARE     (Physician co-signature of this document indicates review and certification of the therapy plan).                      Referring physician: Dr. Kandis MD   Initial Assessment  See Epic Evaluation- Start of care: 03/09/21

## 2021-03-11 ENCOUNTER — HOSPITAL ENCOUNTER (OUTPATIENT)
Dept: PHYSICAL THERAPY | Facility: CLINIC | Age: 76
Setting detail: THERAPIES SERIES
End: 2021-03-11
Attending: DERMATOLOGY
Payer: COMMERCIAL

## 2021-03-11 PROCEDURE — 97140 MANUAL THERAPY 1/> REGIONS: CPT | Mod: GP | Performed by: REHABILITATION PRACTITIONER

## 2021-03-12 ENCOUNTER — IMMUNIZATION (OUTPATIENT)
Dept: FAMILY MEDICINE | Facility: CLINIC | Age: 76
End: 2021-03-12
Payer: COMMERCIAL

## 2021-03-12 PROCEDURE — 0011A PR COVID VAC MODERNA 100 MCG/0.5 ML IM: CPT

## 2021-03-12 PROCEDURE — 91301 PR COVID VAC MODERNA 100 MCG/0.5 ML IM: CPT

## 2021-03-15 ENCOUNTER — HOSPITAL ENCOUNTER (OUTPATIENT)
Dept: PHYSICAL THERAPY | Facility: CLINIC | Age: 76
Setting detail: THERAPIES SERIES
End: 2021-03-15
Attending: DERMATOLOGY
Payer: COMMERCIAL

## 2021-03-15 PROCEDURE — 97140 MANUAL THERAPY 1/> REGIONS: CPT | Mod: GP | Performed by: REHABILITATION PRACTITIONER

## 2021-03-17 ENCOUNTER — HOSPITAL ENCOUNTER (OUTPATIENT)
Dept: PHYSICAL THERAPY | Facility: CLINIC | Age: 76
Setting detail: THERAPIES SERIES
End: 2021-03-17
Attending: DERMATOLOGY
Payer: COMMERCIAL

## 2021-03-17 PROCEDURE — 97140 MANUAL THERAPY 1/> REGIONS: CPT | Mod: GP | Performed by: REHABILITATION PRACTITIONER

## 2021-03-22 ENCOUNTER — HOSPITAL ENCOUNTER (OUTPATIENT)
Dept: PHYSICAL THERAPY | Facility: CLINIC | Age: 76
Setting detail: THERAPIES SERIES
End: 2021-03-22
Attending: DERMATOLOGY
Payer: COMMERCIAL

## 2021-03-22 PROCEDURE — 97140 MANUAL THERAPY 1/> REGIONS: CPT | Mod: GP | Performed by: REHABILITATION PRACTITIONER

## 2021-03-24 ENCOUNTER — HOSPITAL ENCOUNTER (OUTPATIENT)
Dept: PHYSICAL THERAPY | Facility: CLINIC | Age: 76
Setting detail: THERAPIES SERIES
End: 2021-03-24
Attending: DERMATOLOGY
Payer: COMMERCIAL

## 2021-03-24 PROCEDURE — 97140 MANUAL THERAPY 1/> REGIONS: CPT | Mod: GP | Performed by: REHABILITATION PRACTITIONER

## 2021-03-26 ENCOUNTER — HOSPITAL ENCOUNTER (OUTPATIENT)
Dept: PHYSICAL THERAPY | Facility: CLINIC | Age: 76
Setting detail: THERAPIES SERIES
End: 2021-03-26
Attending: DERMATOLOGY
Payer: COMMERCIAL

## 2021-03-26 PROCEDURE — 97140 MANUAL THERAPY 1/> REGIONS: CPT | Mod: GP | Performed by: REHABILITATION PRACTITIONER

## 2021-03-31 ENCOUNTER — HOSPITAL ENCOUNTER (OUTPATIENT)
Dept: PHYSICAL THERAPY | Facility: CLINIC | Age: 76
Setting detail: THERAPIES SERIES
End: 2021-03-31
Attending: DERMATOLOGY
Payer: COMMERCIAL

## 2021-03-31 PROCEDURE — 97140 MANUAL THERAPY 1/> REGIONS: CPT | Mod: GP | Performed by: PHYSICAL THERAPIST

## 2021-04-09 ENCOUNTER — HOSPITAL ENCOUNTER (OUTPATIENT)
Dept: PHYSICAL THERAPY | Facility: CLINIC | Age: 76
Setting detail: THERAPIES SERIES
End: 2021-04-09
Attending: DERMATOLOGY
Payer: COMMERCIAL

## 2021-04-09 ENCOUNTER — IMMUNIZATION (OUTPATIENT)
Dept: FAMILY MEDICINE | Facility: CLINIC | Age: 76
End: 2021-04-09
Attending: FAMILY MEDICINE
Payer: COMMERCIAL

## 2021-04-09 PROCEDURE — 97140 MANUAL THERAPY 1/> REGIONS: CPT | Mod: GP | Performed by: REHABILITATION PRACTITIONER

## 2021-04-09 PROCEDURE — 0012A PR COVID VAC MODERNA 100 MCG/0.5 ML IM: CPT

## 2021-04-09 PROCEDURE — 91301 PR COVID VAC MODERNA 100 MCG/0.5 ML IM: CPT

## 2021-04-15 ENCOUNTER — HOSPITAL ENCOUNTER (OUTPATIENT)
Dept: PHYSICAL THERAPY | Facility: CLINIC | Age: 76
Setting detail: THERAPIES SERIES
End: 2021-04-15
Attending: DERMATOLOGY
Payer: COMMERCIAL

## 2021-04-15 PROCEDURE — 97140 MANUAL THERAPY 1/> REGIONS: CPT | Mod: GP | Performed by: PHYSICAL THERAPIST

## 2021-04-20 NOTE — PROGRESS NOTES
Outpatient Physical Therapy Progress Note     Patient: Anil Gutierres Jr.  : 1945    Beginning/End Dates of Reporting Period:  3/9/2021 to 2021    Referring Provider: Dr. Kandis MD    Therapy Diagnosis: BLE secondary lymphedema s/p R-SAMMI with stasis changes (RLE>LLE)     Client Self Report: wearing new socks daily and spandigrip nightly on both legs    Objective Measurements:  Objective Measure: girth  Details: RLE +3.9% and LLE +1.7%     Outcome Measures (most recent score):   LLIS = 11 on eval; will again complete at discharge    Goals:  Goal Identifier stg   Goal Description pt to have around the clock tolerance to BLE GCB for edema reduction response   Target Date 21   Date Met  21   Progress:     Goal Identifier stg   Goal Description pt's wife to be independent with BLE GCB for edema reducion response   Target Date 21   Date Met  (spouse unable to rebandage at home)   Progress:     Goal Identifier ltg   Goal Description once appropriate, pt and /or wife to be independent with donning, doffing and care of compression garments for longterm edema management for maintenance   Target Date 21   Date Met  21   Progress:     Goal Identifier ltg   Goal Description pt to be independent with longterm edema management via HEP, elevation, skin cares and compression garment wear/use   Target Date 21   Date Met      Progress:     Goal Identifier ltg   Goal Description pt to have at least 3 point improvement on LLIS due to decreased edema and associated symptoms in BLEs   Target Date 21   Date Met      Progress:       Progress Toward Goals:   Patient overall has made good progress toward goals and is in compression stockings (Sigvaris Secure L2 ( large average long) from [email or web address]) and anticipate 1-2 additional sessions to ensure that current compression garments are adequately containing BLE edema for long-term management for maintenance.        Plan:  Continue therapy per current plan of care.    Discharge:  No

## 2021-04-25 ENCOUNTER — HEALTH MAINTENANCE LETTER (OUTPATIENT)
Age: 76
End: 2021-04-25

## 2021-05-06 ENCOUNTER — HOSPITAL ENCOUNTER (OUTPATIENT)
Dept: PHYSICAL THERAPY | Facility: CLINIC | Age: 76
Setting detail: THERAPIES SERIES
End: 2021-05-06
Attending: DERMATOLOGY
Payer: COMMERCIAL

## 2021-05-06 PROCEDURE — 97140 MANUAL THERAPY 1/> REGIONS: CPT | Mod: GP | Performed by: PHYSICAL THERAPIST

## 2021-05-06 NOTE — PROGRESS NOTES
Outpatient Physical Therapy Discharge Note     Patient: Anil Gutierres Jr.  : 1945    Beginning/End Dates of Reporting Period:  2021 to 2021    Referring Provider: Dr. Kandis MD    Therapy Diagnosis: BLE secondary lymphedema      Client Self Report: the legs haven't been this small in years    Objective Measurements:  Objective Measure: girth  Details: since last seen and measured on 4/15/21: RLE -1.9% and LLE +1.4%; since initial evaluation on 3/9/21: RLE -12.7% and LLE -12.2%     Outcome Measures (most recent score):  Lymphedema Life Impact Scale (score range 0-72). A higher score indicates greater impairment.: 0    Goals:  Goal Identifier stg   Goal Description pt to have around the clock tolerance to BLE GCB for edema reduction response   Target Date 21   Date Met  21   Progress:     Goal Identifier stg   Goal Description pt's wife to be independent with BLE GCB for edema reducion response   Target Date 21   Date Met  (spouse unable to rebandage at home)   Progress:     Goal Identifier ltg   Goal Description once appropriate, pt and /or wife to be independent with donning, doffing and care of compression garments for longterm edema management for maintenance   Target Date 21   Date Met  21   Progress:     Goal Identifier ltg   Goal Description pt to be independent with longterm edema management via HEP, elevation, skin cares and compression garment wear/use   Target Date 21   Date Met  21   Progress:     Goal Identifier ltg   Goal Description pt to have at least 3 point improvement on LLIS due to decreased edema and associated symptoms in BLEs   Target Date 21   Date Met  21   Progress:     Progress Toward Goals:   Goals met      Plan:  Discharge from therapy.    Discharge:    Reason for Discharge: Patient has met all goals.    Equipment Issued: Sigvaris Secure L2 ( large average long) from [facility]    Discharge Plan: Patient to  continue home program.

## 2021-07-22 ENCOUNTER — TRANSFERRED RECORDS (OUTPATIENT)
Dept: HEALTH INFORMATION MANAGEMENT | Facility: CLINIC | Age: 76
End: 2021-07-22
Payer: COMMERCIAL

## 2021-10-10 ENCOUNTER — HEALTH MAINTENANCE LETTER (OUTPATIENT)
Age: 76
End: 2021-10-10

## 2021-10-25 ENCOUNTER — MYC MEDICAL ADVICE (OUTPATIENT)
Dept: OTOLARYNGOLOGY | Facility: CLINIC | Age: 76
End: 2021-10-25

## 2021-10-26 NOTE — PROGRESS NOTES
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 is a 75 year old male who presents today for evaluation.  The patient presents with a lesion on the right lateral tongue. The patient has noticed for approximately 1 to 2 years, his dentist biopsied in July.  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  The posterior right tongue biopsy was muscle and adipose tissue without any evidence of dysplasia.    Area occasionally bothers the patient.  It hasn't been changing in size. No citrus or spicy intolerance. No bleeding.  Patient is a former smoker, roughly 20 pack years quitting in 1984.  No history of chewing tobacco. No lumps or swollen glands in the neck.     Past Medical History  There is no problem list on file for this patient.    Current Medications     Current Outpatient Medications:      aspirin 81 MG EC tablet, Take 81 mg by mouth daily, Disp: , Rfl:      atenolol (TENORMIN) 50 MG tablet, Take 50 mg by mouth daily, Disp: , Rfl:      furosemide (LASIX) 20 MG tablet, Take 20 mg by mouth as needed, Disp: , Rfl:      Glucose Blood (BLOOD GLUCOSE TEST STRIPS) STRP, by In Vitro route daily, Disp: , Rfl:      levothyroxine (SYNTHROID/LEVOTHROID) 137 MCG tablet, Take 137 mcg by mouth, Disp: , Rfl:      lisinopril (ZESTRIL) 20 MG tablet, Take 20 mg by mouth Taking 1/2 tablet per day, Disp: , Rfl:      fluocinonide (LIDEX) 0.05 % external cream, Apply topically 2 times daily (Patient not taking: Reported on 10/27/2021), Disp: 120 g, Rfl: 3     loratadine (CLARITIN) 10 MG tablet, Take 10 mg by mouth (Patient not taking: Reported on 10/27/2021), Disp: , Rfl:     Allergies  Allergies   Allergen Reactions      "Amoxicillin GI Disturbance     N, V,  D   6/21  when     took   for   dental prophylaxis      Seasonal Allergies      Simvastatin Muscle Pain (Myalgia)       Social History   Social History     Socioeconomic History     Marital status:      Spouse name: Not on file     Number of children: Not on file     Years of education: Not on file     Highest education level: Not on file   Occupational History     Not on file   Tobacco Use     Smoking status: Never Smoker     Smokeless tobacco: Never Used   Substance and Sexual Activity     Alcohol use: Not on file     Drug use: Not on file     Sexual activity: Not on file   Other Topics Concern     Not on file   Social History Narrative     Not on file     Social Determinants of Health     Financial Resource Strain:      Difficulty of Paying Living Expenses:    Food Insecurity:      Worried About Running Out of Food in the Last Year:      Ran Out of Food in the Last Year:    Transportation Needs:      Lack of Transportation (Medical):      Lack of Transportation (Non-Medical):    Physical Activity:      Days of Exercise per Week:      Minutes of Exercise per Session:    Stress:      Feeling of Stress :    Social Connections:      Frequency of Communication with Friends and Family:      Frequency of Social Gatherings with Friends and Family:      Attends Shinto Services:      Active Member of Clubs or Organizations:      Attends Club or Organization Meetings:      Marital Status:    Intimate Partner Violence:      Fear of Current or Ex-Partner:      Emotionally Abused:      Physically Abused:      Sexually Abused:        Family History  History reviewed. No pertinent family history.    Review of Systems  As per HPI and PMHx, otherwise 10+ comprehensive system review is negative.    Physical Exam  BP (!) 148/81 (BP Location: Right arm, Patient Position: Sitting, Cuff Size: Adult Regular)   Pulse 55   Temp 97.5  F (36.4  C) (Tympanic)   Ht 1.803 m (5' 11\")   Wt " 115.7 kg (255 lb)   BMI 35.57 kg/m    GENERAL: Patient is a pleasant, cooperative 75 year old male in no acute distress.  HEAD: Normocephalic, atraumatic.  Hair and scalp are normal.  EYES: Pupils are equal, round, reactive to light and accommodation.  Extraocular movements are intact.  The sclera nonicteric without injection.  The extraocular structures are normal.  EARS: Normal shape and symmetry.  No tenderness when palpating the mastoid or tragal areas bilaterally.    NOSE: Nares are patent.  Nasal mucosa is pink and moist.  Negative anterior rhinoscopy.  ORAL CAVITY: Dentition is in good repair.  Mucous membranes are slightly dry.  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  I can see biopsy site change anteriorly on the right.  No sierra ulceration or mass in the tongue.  No significant additional areas of leukoplakia or erythroplakia in the oral cavity. Tongue is mobile, protrudes to the midline.  Palate elevates symmetrically.  Tonsils are surgically absent.  No erythema or exudate.  No additional oral cavity or oropharyngeal masses, lesions, ulcerations, leukoplakia.  NECK: Supple, trachea is midline.  There no palpable cervical lymphadenopathy or masses bilaterally.  Palpation of the bilateral parotid and submandibular areas reveal no masses.  No thyromegaly.    NEUROLOGIC: Cranial nerves II through XII are grossly intact.  Voice is strong.  Patient is House-Brackmann I/VI bilaterally.  CARDIOVASCULAR: Extremities are warm and well-perfused.  No significant peripheral edema.  RESPIRATORY: Patient has nonlabored breathing without cough, wheeze, stridor.  PSYCHIATRIC: Patient is alert and oriented.  Mood and affect appear normal.  SKIN: Warm and dry.  No scalp, face, or neck lesions noted.    Assessment and Plan     ICD-10-CM    1. 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  The patient presents today with a lesion on the right lateral tongue with biopsy showing high-grade premalignant epithelial dysplasia.  Given that the area is focal, we discussed surgical excision to ensure negative margins.  We also discussed options including laser therapy, radiation therapy, or surgical resection using frozen section pathology.  I think in this specific instance with a small focal area, partial glossectomy would be best to ensure negative margins and prevent progression.    We discussed the risks, benefits, alternatives, options of right partial glossectomy including, but not limited to: Risk of bleeding, risk of infection, risk of scarring or tethering of tissues, numbness at the surgical site, potential need for additional procedures, risk of general anesthesia.  The patient voiced understanding is willing to proceed.  We discussed the postoperative course and convalescence including dietary restrictions and activity restrictions.    Don to follow up with Primary Care provider regarding elevated blood pressure.    Rojelio Horton MD  Department of Otolaryngology-Head and Neck Surgery  -[place]

## 2021-10-27 ENCOUNTER — OFFICE VISIT (OUTPATIENT)
Dept: OTOLARYNGOLOGY | Facility: CLINIC | Age: 76
End: 2021-10-27
Payer: COMMERCIAL

## 2021-10-27 VITALS
WEIGHT: 255 LBS | SYSTOLIC BLOOD PRESSURE: 148 MMHG | TEMPERATURE: 97.5 F | BODY MASS INDEX: 35.7 KG/M2 | HEART RATE: 55 BPM | HEIGHT: 71 IN | DIASTOLIC BLOOD PRESSURE: 81 MMHG

## 2021-10-27 DIAGNOSIS — K14.8 TONGUE LESION: Primary | ICD-10-CM

## 2021-10-27 DIAGNOSIS — K13.79 ACQUIRED DYSPLASIA OF ORAL CAVITY: ICD-10-CM

## 2021-10-27 PROCEDURE — 99203 OFFICE O/P NEW LOW 30 MIN: CPT | Performed by: OTOLARYNGOLOGY

## 2021-10-27 RX ORDER — FUROSEMIDE 20 MG
20 TABLET ORAL PRN
COMMUNITY
Start: 2020-09-16 | End: 2022-12-14

## 2021-10-27 RX ORDER — LEVOTHYROXINE SODIUM 137 UG/1
137 TABLET ORAL
COMMUNITY
Start: 2019-01-01 | End: 2022-06-02

## 2021-10-27 RX ORDER — LISINOPRIL 20 MG/1
20 TABLET ORAL
COMMUNITY
Start: 2020-01-01 | End: 2022-10-31

## 2021-10-27 RX ORDER — LORATADINE 10 MG/1
10 TABLET ORAL
COMMUNITY

## 2021-10-27 ASSESSMENT — MIFFLIN-ST. JEOR: SCORE: 1918.8

## 2021-10-27 NOTE — NURSING NOTE
"Initial BP (!) 148/81 (BP Location: Right arm, Patient Position: Sitting, Cuff Size: Adult Regular)   Pulse 55   Temp 97.5  F (36.4  C) (Tympanic)   Ht 1.803 m (5' 11\")   Wt 115.7 kg (255 lb)   BMI 35.57 kg/m   Estimated body mass index is 35.57 kg/m  as calculated from the following:    Height as of this encounter: 1.803 m (5' 11\").    Weight as of this encounter: 115.7 kg (255 lb). .    Diandra Kent CMA    "

## 2021-10-27 NOTE — PATIENT INSTRUCTIONS
Per physician instructions      If you have questions or concerns on any instructions given to you by your provider today or if you need to schedule an appointment, you can reach us at [phone].

## 2021-10-27 NOTE — LETTER
10/27/2021         RE: Anil Gutierres Jr.  [address] [address] [address]        Dear Colleague,    Thank you for referring your patient, Anil Gutierres Jr., to the [facility]. Please see a copy of my visit note below.    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 is a 75 year old male who presents today for evaluation.  The patient presents with a lesion on the right lateral tongue. The patient has noticed for approximately 1 to 2 years, his dentist biopsied in July.  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  The posterior right tongue biopsy was muscle and adipose tissue without any evidence of dysplasia.    Area occasionally bothers the patient.  It hasn't been changing in size. No citrus or spicy intolerance. No bleeding.  Patient is a former smoker, roughly 20 pack years quitting in 1984.  No history of chewing tobacco. No lumps or swollen glands in the neck.     Past Medical History  There is no problem list on file for this patient.    Current Medications     Current Outpatient Medications:      aspirin 81 MG EC tablet, Take 81 mg by mouth daily, Disp: , Rfl:      atenolol (TENORMIN) 50 MG tablet, Take 50 mg by mouth daily, Disp: , Rfl:      furosemide (LASIX) 20 MG tablet, Take 20 mg by mouth as needed, Disp: , Rfl:      Glucose Blood (BLOOD GLUCOSE TEST STRIPS) STRP, by In Vitro route daily, Disp: , Rfl:      levothyroxine (SYNTHROID/LEVOTHROID) 137 MCG tablet, Take 137 mcg by mouth, Disp: , Rfl:      lisinopril (ZESTRIL) 20 MG tablet, Take 20 mg by mouth Taking 1/2 tablet per day, Disp: , Rfl:      fluocinonide (LIDEX) 0.05 % external cream, Apply  topically 2 times daily (Patient not taking: Reported on 10/27/2021), Disp: 120 g, Rfl: 3     loratadine (CLARITIN) 10 MG tablet, Take 10 mg by mouth (Patient not taking: Reported on 10/27/2021), Disp: , Rfl:     Allergies  Allergies   Allergen Reactions     Amoxicillin GI Disturbance     N, V,  D   6/21  when     took   for   dental prophylaxis      Seasonal Allergies      Simvastatin Muscle Pain (Myalgia)       Social History   Social History     Socioeconomic History     Marital status:      Spouse name: Not on file     Number of children: Not on file     Years of education: Not on file     Highest education level: Not on file   Occupational History     Not on file   Tobacco Use     Smoking status: Never Smoker     Smokeless tobacco: Never Used   Substance and Sexual Activity     Alcohol use: Not on file     Drug use: Not on file     Sexual activity: Not on file   Other Topics Concern     Not on file   Social History Narrative     Not on file     Social Determinants of Health     Financial Resource Strain:      Difficulty of Paying Living Expenses:    Food Insecurity:      Worried About Running Out of Food in the Last Year:      Ran Out of Food in the Last Year:    Transportation Needs:      Lack of Transportation (Medical):      Lack of Transportation (Non-Medical):    Physical Activity:      Days of Exercise per Week:      Minutes of Exercise per Session:    Stress:      Feeling of Stress :    Social Connections:      Frequency of Communication with Friends and Family:      Frequency of Social Gatherings with Friends and Family:      Attends Hindu Services:      Active Member of Clubs or Organizations:      Attends Club or Organization Meetings:      Marital Status:    Intimate Partner Violence:      Fear of Current or Ex-Partner:      Emotionally Abused:      Physically Abused:      Sexually Abused:        Family History  History reviewed. No pertinent family history.    Review of Systems  As per  "HPI and PMHx, otherwise 10+ comprehensive system review is negative.    Physical Exam  BP (!) 148/81 (BP Location: Right arm, Patient Position: Sitting, Cuff Size: Adult Regular)   Pulse 55   Temp 97.5  F (36.4  C) (Tympanic)   Ht 1.803 m (5' 11\")   Wt 115.7 kg (255 lb)   BMI 35.57 kg/m    GENERAL: Patient is a pleasant, cooperative 75 year old male in no acute distress.  HEAD: Normocephalic, atraumatic.  Hair and scalp are normal.  EYES: Pupils are equal, round, reactive to light and accommodation.  Extraocular movements are intact.  The sclera nonicteric without injection.  The extraocular structures are normal.  EARS: Normal shape and symmetry.  No tenderness when palpating the mastoid or tragal areas bilaterally.    NOSE: Nares are patent.  Nasal mucosa is pink and moist.  Negative anterior rhinoscopy.  ORAL CAVITY: Dentition is in good repair.  Mucous membranes are slightly dry.  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  I can see biopsy site change anteriorly on the right.  No sierra ulceration or mass in the tongue.  No significant additional areas of leukoplakia or erythroplakia in the oral cavity. Tongue is mobile, protrudes to the midline.  Palate elevates symmetrically.  Tonsils are surgically absent.  No erythema or exudate.  No additional oral cavity or oropharyngeal masses, lesions, ulcerations, leukoplakia.  NECK: Supple, trachea is midline.  There no palpable cervical lymphadenopathy or masses bilaterally.  Palpation of the bilateral parotid and submandibular areas reveal no masses.  No thyromegaly.    NEUROLOGIC: Cranial nerves II through XII are grossly intact.  Voice is strong.  Patient is House-Brackmann I/VI bilaterally.  CARDIOVASCULAR: Extremities are warm and well-perfused.  No significant peripheral edema.  RESPIRATORY: Patient has nonlabored breathing without cough, wheeze, stridor.  PSYCHIATRIC: Patient is alert and oriented.  Mood and " affect appear normal.  SKIN: Warm and dry.  No scalp, face, or neck lesions noted.    Assessment and Plan     ICD-10-CM    1. 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  The patient presents today with a lesion on the right lateral tongue with biopsy showing high-grade premalignant epithelial dysplasia.  Given that the area is focal, we discussed surgical excision to ensure negative margins.  We also discussed options including laser therapy, radiation therapy, or surgical resection using frozen section pathology.  I think in this specific instance with a small focal area, partial glossectomy would be best to ensure negative margins and prevent progression.    We discussed the risks, benefits, alternatives, options of right partial glossectomy including, but not limited to: Risk of bleeding, risk of infection, risk of scarring or tethering of tissues, numbness at the surgical site, potential need for additional procedures, risk of general anesthesia.  The patient voiced understanding is willing to proceed.  We discussed the postoperative course and convalescence including dietary restrictions and activity restrictions.    Don to follow up with Primary Care provider regarding elevated blood pressure.    Rojelio Horton MD  Department of Otolaryngology-Head and Neck Surgery  [facility]         Again, thank you for allowing me to participate in the care of your patient.        Sincerely,        Rojelio Horton MD

## 2021-11-24 RX ORDER — CLINDAMYCIN HCL 300 MG
300 CAPSULE ORAL 4 TIMES DAILY
COMMUNITY
End: 2022-04-18

## 2021-11-29 ENCOUNTER — OFFICE VISIT (OUTPATIENT)
Dept: FAMILY MEDICINE | Facility: CLINIC | Age: 76
End: 2021-11-29
Payer: COMMERCIAL

## 2021-11-29 ENCOUNTER — LAB (OUTPATIENT)
Dept: LAB | Facility: CLINIC | Age: 76
End: 2021-11-29
Attending: OTOLARYNGOLOGY
Payer: COMMERCIAL

## 2021-11-29 VITALS
RESPIRATION RATE: 14 BRPM | DIASTOLIC BLOOD PRESSURE: 76 MMHG | OXYGEN SATURATION: 99 % | HEIGHT: 71 IN | SYSTOLIC BLOOD PRESSURE: 138 MMHG | WEIGHT: 258.6 LBS | HEART RATE: 51 BPM | BODY MASS INDEX: 36.2 KG/M2 | TEMPERATURE: 96.9 F

## 2021-11-29 DIAGNOSIS — K13.79 ACQUIRED DYSPLASIA OF ORAL CAVITY: ICD-10-CM

## 2021-11-29 DIAGNOSIS — K14.8 TONGUE LESION: ICD-10-CM

## 2021-11-29 DIAGNOSIS — E78.2 MIXED HYPERLIPIDEMIA: ICD-10-CM

## 2021-11-29 DIAGNOSIS — R60.0 LEG EDEMA: ICD-10-CM

## 2021-11-29 DIAGNOSIS — E03.9 HYPOTHYROIDISM, UNSPECIFIED TYPE: ICD-10-CM

## 2021-11-29 DIAGNOSIS — E11.9 CONTROLLED TYPE 2 DIABETES MELLITUS WITHOUT COMPLICATION, WITHOUT LONG-TERM CURRENT USE OF INSULIN (H): ICD-10-CM

## 2021-11-29 DIAGNOSIS — Z01.818 PREOP GENERAL PHYSICAL EXAM: Primary | ICD-10-CM

## 2021-11-29 DIAGNOSIS — Z23 NEED FOR PROPHYLACTIC VACCINATION AND INOCULATION AGAINST INFLUENZA: ICD-10-CM

## 2021-11-29 DIAGNOSIS — I10 BENIGN ESSENTIAL HYPERTENSION: ICD-10-CM

## 2021-11-29 LAB
ANION GAP SERPL CALCULATED.3IONS-SCNC: 7 MMOL/L (ref 3–14)
BUN SERPL-MCNC: 34 MG/DL (ref 7–30)
CALCIUM SERPL-MCNC: 10 MG/DL (ref 8.5–10.1)
CHLORIDE BLD-SCNC: 106 MMOL/L (ref 94–109)
CO2 SERPL-SCNC: 24 MMOL/L (ref 20–32)
CREAT SERPL-MCNC: 1.62 MG/DL (ref 0.66–1.25)
GFR SERPL CREATININE-BSD FRML MDRD: 41 ML/MIN/1.73M2
GLUCOSE BLD-MCNC: 134 MG/DL (ref 70–99)
HBA1C MFR BLD: 6.3 % (ref 0–5.6)
POTASSIUM BLD-SCNC: 4.4 MMOL/L (ref 3.4–5.3)
SODIUM SERPL-SCNC: 137 MMOL/L (ref 133–144)

## 2021-11-29 PROCEDURE — 99204 OFFICE O/P NEW MOD 45 MIN: CPT | Mod: 25 | Performed by: FAMILY MEDICINE

## 2021-11-29 PROCEDURE — 83036 HEMOGLOBIN GLYCOSYLATED A1C: CPT | Performed by: FAMILY MEDICINE

## 2021-11-29 PROCEDURE — 80048 BASIC METABOLIC PNL TOTAL CA: CPT | Performed by: FAMILY MEDICINE

## 2021-11-29 PROCEDURE — U0003 INFECTIOUS AGENT DETECTION BY NUCLEIC ACID (DNA OR RNA); SEVERE ACUTE RESPIRATORY SYNDROME CORONAVIRUS 2 (SARS-COV-2) (CORONAVIRUS DISEASE [COVID-19]), AMPLIFIED PROBE TECHNIQUE, MAKING USE OF HIGH THROUGHPUT TECHNOLOGIES AS DESCRIBED BY CMS-2020-01-R: HCPCS

## 2021-11-29 PROCEDURE — U0005 INFEC AGEN DETEC AMPLI PROBE: HCPCS

## 2021-11-29 PROCEDURE — 90662 IIV NO PRSV INCREASED AG IM: CPT | Performed by: FAMILY MEDICINE

## 2021-11-29 PROCEDURE — 36415 COLL VENOUS BLD VENIPUNCTURE: CPT | Performed by: FAMILY MEDICINE

## 2021-11-29 PROCEDURE — G0008 ADMIN INFLUENZA VIRUS VAC: HCPCS | Performed by: FAMILY MEDICINE

## 2021-11-29 ASSESSMENT — MIFFLIN-ST. JEOR: SCORE: 1935.13

## 2021-11-29 NOTE — PATIENT INSTRUCTIONS
You may take lisinopril and levothyroxine on morning of surgery if they are scheduled to be taken then. Take only with a small sip of water.  Do not take furosemide.  All other scheduled medication may be held on morning of surgery, and resumed when you are allowed to eat.     Do not take Ibuprofen, Aspirin or Naproxen from now until after procedure.  If you need to take something for pain, take Acetaminophen 325 mg orally 1-2 tabs every 4-6 hrs as needed for pain      Preparing for Your Surgery  Getting started  A nurse will call you to review your health history and instructions. They will give you an arrival time based on your scheduled surgery time.  Please be ready to share the following:    Your doctor's clinic name and phone number    Your medical, surgical and anesthesia history    A list of allergies and sensitivities    A list of medicines, including herbal treatments and over-the-counter drugs    Whether the patient has a legal guardian (ask how to send us the papers in advance)  If you have a child who's having surgery, please ask for a copy of Preparing for Your Child's Surgery.    Preparing for surgery    Within 30 days of surgery: Have a pre-op exam (sometimes called an H&P, or History and Physical). This can be done at a clinic or pre-operative center.  ? If you're having a , you may not need this exam. Talk to your care team    At your pre-op exam, talk to your care team about all medicines you take. If you need to stop any medicines before surgery, ask when to start taking them again.  ? We do this for your safety. Many medicines can make you bleed too much during surgery. Some change how well surgery (anesthesia) drugs work.    Call your insurance company to let them know you're having surgery. (If you don't have insurance, call [phone].)    Call your clinic if there's any change in your health. This includes signs of a cold or flu (sore throat, runny nose, cough, rash, fever). It  also includes a scrape or scratch near the surgery site.    If you have questions on the day of surgery, call your hospital or surgery center.  Eating and drinking guidelines  For your safety: Unless your surgeon tells you otherwise, follow the guidelines below.    Eat and drink as usual until 8 hours before surgery. After that, no food or milk.    Drink clear liquids until 2 hours before surgery. These are liquids you can see through, like water, Gatorade and Propel Water. You may also have black coffee and tea (no cream or milk).    Nothing by mouth within 2 hours of surgery. This includes gum, candy and breath mints.    If you drink, stop drinking alcohol the night before surgery.    If your care team tells you to take medicine on the morning of surgery, it's okay to take it with a sip of water.  Preventing infection    Shower or bathe the night before and morning of your surgery. Follow the instructions your clinic gave you. (If no instructions, use regular soap.)    Don't shave or clip hair near your surgery site. We'll remove the hair if needed.    Don't smoke or vape the morning of surgery. You may chew nicotine gum up to 2 hours before surgery. A nicotine patch is okay.  ? Note: Some surgeries require you to completely quit smoking and nicotine. Check with your surgeon.    Your care team will make every effort to keep you safe from infection. We will:  ? Clean our hands often with soap and water (or an alcohol-based hand rub).  ? Clean the skin at your surgery site with a special soap that kills germs.  ? Give you a special gown to keep you warm. (Cold raises the risk of infection.)  ? Wear special hair covers, masks, gowns and gloves during surgery.  ? Give antibiotic medicine, if prescribed. Not all surgeries need antibiotics.  What to bring on the day of surgery    Photo ID and insurance card    Copy of your health care directive, if you have one    Glasses and hearing aides (bring cases)  ? You can't  wear contacts during surgery    Inhaler and eye drops, if you use them (tell us about these when you arrive)    CPAP machine or breathing device, if you use them    A few personal items, if spending the night    If you have . . .  ? A pacemaker or ICD (cardiac defibrillator): Bring the ID card.  ? An implanted stimulator: Bring the remote control.  ? A legal guardian: Bring a copy of the certified (court-stamped) guardianship papers.  Please remove any jewelry, including body piercings. Leave jewelry and other valuables at home.  If you're going home the day of surgery  Important: If you don't follow the rules below, we must cancel your surgery.     Arrange for someone to drive you home after surgery. You may not drive, take a taxi or take public transportation by yourself (unless you'll have local anesthesia only).    Arrange for a responsible adult to stay with you overnight. If you don't, we may keep you in the hospital overnight, and you may need to pay the costs yourself.  Questions?   If you have any questions for your care team, list them here: _________________________________________________________________________________________________________________________________________________________________________________________________________________________________________________________________________________________________________________________  For informational purposes only. Not to replace the advice of your health care provider. Copyright   2003, 2019 [facility]. All rights reserved. Clinically reviewed by Halima Celestin MD. [employer] [id number] - REV 4/20.    Before Your Procedure or Hospital Admission  Testing for COVID-19 (Coronavirus)  Thank you for choosing [facility] for your health care needs. The COVID-19 pandemic is a very challenging time for everyone.   Our goal is to keep you and our team here at [facility] safe and healthy. We've taken many steps to make this  "happen. For example:    We test and screen our staff, care teams and patients for COVID-19.    Everyone at [facility] must wear a mask and stay 6 feet apart.    We are limiting hospital and clinic visitors.  Before you come in  All patients must get tested for COVID-19. Your test needs to happen 2 to 4 days before you check in to the hospital or surgery site.   A clinic scheduler will call about a week in advance to set up a testing time at one of our labs. We'll take a swab of your nose or throat.  Note: If you go to a clinic or pharmacy like [facility] or [facility] for your test, make sure you get a test inside the nose. Tests inside the nose are:    A naso-pharyngeal (NP) RT-PCR test    An anterior nares RT-PCR test  Do NOT get a \"rapid\" test or a saliva (spit) test.  After the test, please stay at home and stay out of contact with other people. It will be harder for you to recover if you get COVID-19 before your treatment.  Please follow all current safety guidelines, including:    Limit trips outside your home.    Limit the number of people you see.    Always wear a mask outside your home.    Use social distancing. Stay 6 feet away from others whenever you can.    Wash your hands often.  If your test shows you have COVID-19  If your test is positive, we'll let you know. A positive test means that you have the virus.   We'll probably have to postpone your admission, surgery or procedure. Your doctor will discuss this with you. After that, we'll let you know what to do and when you can re-schedule.   We may need to cancel your treatment on short notice for other reasons, too.  If your test shows you DON'T have COVID-19  Even if your test is negative, you can still get COVID-19. It's rare but, sometimes, the test result is wrong. You could also catch the virus after taking the test.   There's a very small chance that you could catch COVID-19 in the hospital or surgery center. [facility] has taken many steps " "to prevent this from happening.   Day of your surgery or procedure    Please come wearing a face covering that covers both your nose and mouth.    When you arrive, we'll ask you some questions to find out if you have any signs or symptoms of COVID-19.    Ask your care team if you can have visitors. All visitors must wear face coverings and will be screened for signs of COVID-19.  ? Even if no visitors are allowed, you can still have with you:    Your legal guardian or legal decision maker    A parent and one other visitor, if you are younger than 18 years old    A partner and a , if you are in labor  ? We might need to teach you about taking care of yourself after surgery. If so, a visitor can come into the hospital to learn about it, too.  ? The rules for visitors change often, depending on how much the virus is spreading. To learn more, see Visiting a Loved One in the Hospital during the COVID-19 Outbreak.  Please call your care team, hospital or surgery center if you have any questions. We thank you for your understanding and for choosing [facility] for your care.   Questions and answers  Does it matter where I get tested for COVID-19?  Yes. We urge you to get tested at one of our [facility] COVID-19 testing sites. We process these tests in our lab and can get the results quickly. Your [facility] care team needs to get your results before you check in.  What should I do if I can't get tested at [facility]?  You can get tested somewhere else, but you'll need to take these extra steps:   1. Contact your family doctor or clinic to arrange your test.  2. Take the test within 4 days of your surgery or procedure. We can't accept tests older than 4 days.  3. Make sure you're getting a test inside the nose. Tests inside the nose are:  ? A naso-pharyngeal (NP) RT-PCR test  ? An anterior nares RT-PCR test  Many pharmacies use \"rapid\" tests or saliva (spit) tests. We do NOT accept those " tests before surgery or procedures. Tests from inside the nose are the most accurate tests.  1. Make sure your doctor or clinic faxes your results to [facility] at [phone].  If we don't get your results in time, we may have to delay or cancel your treatment.  For informational purposes only. Not to replace the advice of your health care provider. Copyright   2020 [employer]. All rights reserved. Clinically reviewed by In[facility] Prevention and the [facility] COVID-19 Clinical Team. [employer] 802637 - Rev 09/09/21.

## 2021-11-29 NOTE — PROGRESS NOTES
[facility]  [address] [address]  Phone: [phone]  Primary Provider: Jean-Pierre Rivera  Pre-op Performing Provider: ERIKA BLANDON      PREOPERATIVE EVALUATION:  Today's date: 11/29/2021    Anil Gutierres Jr. is a 75 year old male who presents for a preoperative evaluation.    Surgical Information:  Surgery/Procedure:  GLOSSECTOMY, PARTIAL Right   Surgery Location: [facility]  Surgeon: Dr Horton  Surgery Date: 12/2/21  Time of Surgery: 10:30  Where patient plans to recover: At home with family  Fax number for surgical facility: Note does not need to be faxed, will be available electronically in Epic.    Type of Anesthesia Anticipated: General    Assessment & Plan     The proposed surgical procedure is considered INTERMEDIATE risk.    Preop general physical exam    Tongue lesion    Benign essential hypertension  Controlled.  Low salt, low fat diet.   Exercise as tolerated.  Take meds as prescribed; call if with side effects.       Hypothyroidism, unspecified type  Asymptomatic. Stable labs 4 months ago.    Mixed hyperlipidemia  Reinforced heart healthy lifestyle.  On statin.    Leg edema  Takes furosemide sporadically only.  Stable. Seeing lymphedema clinic.    Need for prophylactic vaccination and inoculation against influenza  - INFLUENZA, QUAD, HIGH DOSE, PF, 65YR + (FLUZONE HD)    Controlled type 2 diabetes mellitus without complication, without long-term current use of insulin (H)  Stable on labs in Aug 2021. Repeat today for preop eval.  Diet controlled.  - Basic metabolic panel  - Hemoglobin A1c  - Basic metabolic panel  - Hemoglobin A1c      Possible Sleep Apnea: patient was advised to seek sleep consult after surgery. {    Risks and Recommendations:  The patient has the following additional risks and recommendations for perioperative complications:   - Consult Hospitalist / IM to assist with post-op medical management as  appropriate  Diabetes:  - Patient is not on insulin therapy: regular NPO guidelines can be followed.     Medication Instructions:  Patient is to take all scheduled medications on the day of surgery EXCEPT for modifications listed below:   - aspirin: Discontinue aspirin 7-10 days prior to procedure to reduce bleeding risk. It should be resumed postoperatively.    - ACE/ARB: May be continued on the day of surgery.    - Beta Blockers: Continue taking on the day of surgery.   - Diuretics: HOLD on the day of surgery.   - Statins: Continue taking on the day of surgery.     RECOMMENDATION:  APPROVAL GIVEN to proceed with proposed procedure, without further diagnostic evaluation.  Subjective     HPI related to upcoming procedure: Patient has premalignant tongue lesion. Hence, planned surgery. Reviewed above with patient.    Preop Questions 11/26/2021   1. Have you ever had a heart attack or stroke? No   2. Have you ever had surgery on your heart or blood vessels, such as a stent placement, a coronary artery bypass, or surgery on an artery in your head, neck, heart, or legs? No   3. Do you have chest pain with activity? No   4. Do you have a history of  heart failure? No   5. Do you currently have a cold, bronchitis or symptoms of other infection? No   6. Do you have a cough, shortness of breath, or wheezing? No   7. Do you or anyone in your family have previous history of blood clots? UNKNOWN - Father had hx of strokes; patient denies personal hx of stroke   8. Do you or does anyone in your family have a serious bleeding problem such as prolonged bleeding following surgeries or cuts? No   9. Have you ever had problems with anemia or been told to take iron pills? No   10. Have you had any abnormal blood loss such as black, tarry or bloody stools? No   11. Have you ever had a blood transfusion? No   12. Are you willing to have a blood transfusion if it is medically needed before, during, or after your surgery? Yes   13. Have  you or any of your relatives ever had problems with anesthesia? No   14. Do you have sleep apnea, excessive snoring or daytime drowsiness? YES - snoring - no previous eval; patient denies daytime symptoms   14a. Do you have a CPAP machine? No   15. Do you have any artifical heart valves or other implanted medical devices like a pacemaker, defibrillator, or continuous glucose monitor? No   16. Do you have artificial joints? YES - right hip replacement - no complications   17. Are you allergic to latex? No       Health Care Directive:  Patient does not have a Health Care Directive or Living Will: Discussed advance care planning with patient; information given to patient to review.    Preoperative Review of :   reviewed - no record of controlled substances prescribed.      Status of Chronic Conditions:  See problem list for active medical problems.  Problems all longstanding and stable, except as noted/documented.  See ROS for pertinent symptoms related to these conditions.    DIABETES - Patient has a longstanding history of DiabetesType Type II . Patient is being treated with diet and denies significant side effects. Control has been good. Complicating factors include but are not limited to: hypertension, hyperlipidemia and morbid obesity .     HYPERLIPIDEMIA - Patient has a long history of significant Hyperlipidemia requiring medication for treatment with recent fair control. Patient reports no problems or side effects with the medication.     HYPERTENSION - Patient has longstanding history of HTN , currently denies any symptoms referable to elevated blood pressure. Specifically denies chest pain, palpitations, dyspnea, orthopnea, PND or peripheral edema. Blood pressure readings have been in normal range. Current medication regimen is as listed below. Patient denies any side effects of medication.     HYPOTHYROIDISM - Patient has a longstanding history of chronic Hypothyroidism. Patient has been doing well,  noting no tremor, insomnia, hair loss or changes in skin texture. Continues to take medications as directed, without adverse reactions or side effects. Last TSH No results found for: TSH.        Review of Systems  CONSTITUTIONAL: NEGATIVE for fever, chills, change in weight  INTEGUMENTARY/SKIN: NEGATIVE for worrisome rashes, moles or lesions  EYES: NEGATIVE for vision changes or irritation  ENT/MOUTH: NEGATIVE for ear, mouth and throat problems  RESP: NEGATIVE for significant cough or SOB  CV: NEGATIVE for chest pain, palpitations or peripheral edema  GI: NEGATIVE for nausea, abdominal pain, heartburn, or change in bowel habits  : NEGATIVE for frequency, dysuria, or hematuria  MUSCULOSKELETAL: NEGATIVE for significant arthralgias or myalgia  NEURO: NEGATIVE for weakness, dizziness or paresthesias  ENDOCRINE: NEGATIVE for temperature intolerance, skin/hair changes  HEME: NEGATIVE for bleeding problems  PSYCHIATRIC: NEGATIVE for changes in mood or affect    There are no problems to display for this patient.     Past Medical History:   Diagnosis Date     Arthritis 2018    L knee, R hip     Basal cell carcinoma      Diabetes (H) 2000    Type 2     Hypertension 2000     Thyroid disease 2000    Hypothyroidism     Past Surgical History:   Procedure Laterality Date     BIOPSY  2021    Tongue     COLONOSCOPY  2017     EYE SURGERY  2019    cataracts     ORTHOPEDIC SURGERY  dec  23, 2020    R Hip     Current Outpatient Medications   Medication Sig Dispense Refill     aspirin 81 MG EC tablet Take 81 mg by mouth daily       atenolol (TENORMIN) 50 MG tablet Take 50 mg by mouth daily       fluocinonide (LIDEX) 0.05 % external cream Apply topically 2 times daily 120 g 3     furosemide (LASIX) 20 MG tablet Take 20 mg by mouth as needed       levothyroxine (SYNTHROID/LEVOTHROID) 137 MCG tablet Take 137 mcg by mouth       lisinopril (ZESTRIL) 20 MG tablet Take 20 mg by mouth Taking 1/2 tablet per day       loratadine (CLARITIN) 10  "MG tablet Take 10 mg by mouth        clindamycin (CLEOCIN) 300 MG capsule Take 300 mg by mouth 4 times daily (Patient not taking: Reported on 2021)       Glucose Blood (BLOOD GLUCOSE TEST STRIPS) STRP by In Vitro route daily         Allergies   Allergen Reactions     Amoxicillin GI Disturbance     N, V,  D     when     took   for   dental prophylaxis      Seasonal Allergies      Simvastatin Muscle Pain (Myalgia)        Social History     Tobacco Use     Smoking status: Former Smoker     Packs/day: 1.00     Years: 20.00     Pack years: 20.00     Types: Cigarettes     Start date: 1964     Quit date: 1984     Years since quittin.9     Smokeless tobacco: Never Used   Substance Use Topics     Alcohol use: Yes     Comment: one beer or mixed drink a day in warmer weather     Family History   Problem Relation Age of Onset     Diabetes Sister      History   Drug Use Unknown         Objective     /76   Pulse 51   Temp 96.9  F (36.1  C) (Tympanic)   Resp 14   Ht 1.803 m (5' 11\")   Wt 117.3 kg (258 lb 9.6 oz)   SpO2 99%   BMI 36.07 kg/m      Physical Exam  GENERAL APPEARANCE: morbidly obese, alert and no distress; ambulatory w/o assist  EYES: pink conj, no icterus, PERRL, EOMI  HENT: ear canals and TM's normal, nose and mouth without ulcers or lesions, oropharynx clear and oral mucous membranes moist  NECK: no adenopathy, no asymmetry, masses, or scars and thyroid normal to palpation  RESP: lungs clear to auscultation - no rales, rhonchi or wheezes  CV: regular rates and rhythm, normal S1 S2, no S3 or S4, no murmur, click or rub, no peripheral edema and peripheral pulses strong  ABDOMEN: soft, nontender, no hepatosplenomegaly, no masses and bowel sounds normal  MS: no musculoskeletal defects are noted and gait is age appropriate without ataxia  SKIN: good turgor, no rash/jaundice/ecchymosis  NEURO: Normal strength and tone, sensory exam grossly normal, mentation intact and speech normal    No " results for input(s): HGB, PLT, INR, NA, POTASSIUM, CR, A1C in the last 25202 hours.     Diagnostics:  Labs pending at this time.  Results will be reviewed when available.   No EKG required, no history of coronary heart disease, significant arrhythmia, peripheral arterial disease or other structural heart disease.    Revised Cardiac Risk Index (RCRI):  The patient has the following serious cardiovascular risks for perioperative complications:   - No serious cardiac risks = 0 points     RCRI Interpretation: 0 points: Class I (very low risk - 0.4% complication rate)           Signed Electronically by: Gilberto Alonzo MD  Copy of this evaluation report is provided to requesting physician.

## 2021-11-29 NOTE — H&P (VIEW-ONLY)
[facility]  [address] [address]  Phone: [phone]  Primary Provider: Jean-Pierre Rivera  Pre-op Performing Provider: ERIKA BLANDON      PREOPERATIVE EVALUATION:  Today's date: 11/29/2021    Anil Gutierres Jr. is a 75 year old male who presents for a preoperative evaluation.    Surgical Information:  Surgery/Procedure:  GLOSSECTOMY, PARTIAL Right   Surgery Location: [facility]  Surgeon: Dr Horton  Surgery Date: 12/2/21  Time of Surgery: 10:30  Where patient plans to recover: At home with family  Fax number for surgical facility: Note does not need to be faxed, will be available electronically in Epic.    Type of Anesthesia Anticipated: General    Assessment & Plan     The proposed surgical procedure is considered INTERMEDIATE risk.    Preop general physical exam    Tongue lesion    Benign essential hypertension  Controlled.  Low salt, low fat diet.   Exercise as tolerated.  Take meds as prescribed; call if with side effects.       Hypothyroidism, unspecified type  Asymptomatic. Stable labs 4 months ago.    Mixed hyperlipidemia  Reinforced heart healthy lifestyle.  On statin.    Leg edema  Takes furosemide sporadically only.  Stable. Seeing lymphedema clinic.    Need for prophylactic vaccination and inoculation against influenza  - INFLUENZA, QUAD, HIGH DOSE, PF, 65YR + (FLUZONE HD)    Controlled type 2 diabetes mellitus without complication, without long-term current use of insulin (H)  Stable on labs in Aug 2021. Repeat today for preop eval.  Diet controlled.  - Basic metabolic panel  - Hemoglobin A1c  - Basic metabolic panel  - Hemoglobin A1c      Possible Sleep Apnea: patient was advised to seek sleep consult after surgery. {    Risks and Recommendations:  The patient has the following additional risks and recommendations for perioperative complications:   - Consult Hospitalist / IM to assist with post-op medical management as  appropriate  Diabetes:  - Patient is not on insulin therapy: regular NPO guidelines can be followed.     Medication Instructions:  Patient is to take all scheduled medications on the day of surgery EXCEPT for modifications listed below:   - aspirin: Discontinue aspirin 7-10 days prior to procedure to reduce bleeding risk. It should be resumed postoperatively.    - ACE/ARB: May be continued on the day of surgery.    - Beta Blockers: Continue taking on the day of surgery.   - Diuretics: HOLD on the day of surgery.   - Statins: Continue taking on the day of surgery.     RECOMMENDATION:  APPROVAL GIVEN to proceed with proposed procedure, without further diagnostic evaluation.  Subjective     HPI related to upcoming procedure: Patient has premalignant tongue lesion. Hence, planned surgery. Reviewed above with patient.    Preop Questions 11/26/2021   1. Have you ever had a heart attack or stroke? No   2. Have you ever had surgery on your heart or blood vessels, such as a stent placement, a coronary artery bypass, or surgery on an artery in your head, neck, heart, or legs? No   3. Do you have chest pain with activity? No   4. Do you have a history of  heart failure? No   5. Do you currently have a cold, bronchitis or symptoms of other infection? No   6. Do you have a cough, shortness of breath, or wheezing? No   7. Do you or anyone in your family have previous history of blood clots? UNKNOWN - Father had hx of strokes; patient denies personal hx of stroke   8. Do you or does anyone in your family have a serious bleeding problem such as prolonged bleeding following surgeries or cuts? No   9. Have you ever had problems with anemia or been told to take iron pills? No   10. Have you had any abnormal blood loss such as black, tarry or bloody stools? No   11. Have you ever had a blood transfusion? No   12. Are you willing to have a blood transfusion if it is medically needed before, during, or after your surgery? Yes   13. Have  you or any of your relatives ever had problems with anesthesia? No   14. Do you have sleep apnea, excessive snoring or daytime drowsiness? YES - snoring - no previous eval; patient denies daytime symptoms   14a. Do you have a CPAP machine? No   15. Do you have any artifical heart valves or other implanted medical devices like a pacemaker, defibrillator, or continuous glucose monitor? No   16. Do you have artificial joints? YES - right hip replacement - no complications   17. Are you allergic to latex? No       Health Care Directive:  Patient does not have a Health Care Directive or Living Will: Discussed advance care planning with patient; information given to patient to review.    Preoperative Review of :   reviewed - no record of controlled substances prescribed.      Status of Chronic Conditions:  See problem list for active medical problems.  Problems all longstanding and stable, except as noted/documented.  See ROS for pertinent symptoms related to these conditions.    DIABETES - Patient has a longstanding history of DiabetesType Type II . Patient is being treated with diet and denies significant side effects. Control has been good. Complicating factors include but are not limited to: hypertension, hyperlipidemia and morbid obesity .     HYPERLIPIDEMIA - Patient has a long history of significant Hyperlipidemia requiring medication for treatment with recent fair control. Patient reports no problems or side effects with the medication.     HYPERTENSION - Patient has longstanding history of HTN , currently denies any symptoms referable to elevated blood pressure. Specifically denies chest pain, palpitations, dyspnea, orthopnea, PND or peripheral edema. Blood pressure readings have been in normal range. Current medication regimen is as listed below. Patient denies any side effects of medication.     HYPOTHYROIDISM - Patient has a longstanding history of chronic Hypothyroidism. Patient has been doing well,  noting no tremor, insomnia, hair loss or changes in skin texture. Continues to take medications as directed, without adverse reactions or side effects. Last TSH No results found for: TSH.        Review of Systems  CONSTITUTIONAL: NEGATIVE for fever, chills, change in weight  INTEGUMENTARY/SKIN: NEGATIVE for worrisome rashes, moles or lesions  EYES: NEGATIVE for vision changes or irritation  ENT/MOUTH: NEGATIVE for ear, mouth and throat problems  RESP: NEGATIVE for significant cough or SOB  CV: NEGATIVE for chest pain, palpitations or peripheral edema  GI: NEGATIVE for nausea, abdominal pain, heartburn, or change in bowel habits  : NEGATIVE for frequency, dysuria, or hematuria  MUSCULOSKELETAL: NEGATIVE for significant arthralgias or myalgia  NEURO: NEGATIVE for weakness, dizziness or paresthesias  ENDOCRINE: NEGATIVE for temperature intolerance, skin/hair changes  HEME: NEGATIVE for bleeding problems  PSYCHIATRIC: NEGATIVE for changes in mood or affect    There are no problems to display for this patient.     Past Medical History:   Diagnosis Date     Arthritis 2018    L knee, R hip     Basal cell carcinoma      Diabetes (H) 2000    Type 2     Hypertension 2000     Thyroid disease 2000    Hypothyroidism     Past Surgical History:   Procedure Laterality Date     BIOPSY  2021    Tongue     COLONOSCOPY  2017     EYE SURGERY  2019    cataracts     ORTHOPEDIC SURGERY  dec  23, 2020    R Hip     Current Outpatient Medications   Medication Sig Dispense Refill     aspirin 81 MG EC tablet Take 81 mg by mouth daily       atenolol (TENORMIN) 50 MG tablet Take 50 mg by mouth daily       fluocinonide (LIDEX) 0.05 % external cream Apply topically 2 times daily 120 g 3     furosemide (LASIX) 20 MG tablet Take 20 mg by mouth as needed       levothyroxine (SYNTHROID/LEVOTHROID) 137 MCG tablet Take 137 mcg by mouth       lisinopril (ZESTRIL) 20 MG tablet Take 20 mg by mouth Taking 1/2 tablet per day       loratadine (CLARITIN) 10  "MG tablet Take 10 mg by mouth        clindamycin (CLEOCIN) 300 MG capsule Take 300 mg by mouth 4 times daily (Patient not taking: Reported on 2021)       Glucose Blood (BLOOD GLUCOSE TEST STRIPS) STRP by In Vitro route daily         Allergies   Allergen Reactions     Amoxicillin GI Disturbance     N, V,  D     when     took   for   dental prophylaxis      Seasonal Allergies      Simvastatin Muscle Pain (Myalgia)        Social History     Tobacco Use     Smoking status: Former Smoker     Packs/day: 1.00     Years: 20.00     Pack years: 20.00     Types: Cigarettes     Start date: 1964     Quit date: 1984     Years since quittin.9     Smokeless tobacco: Never Used   Substance Use Topics     Alcohol use: Yes     Comment: one beer or mixed drink a day in warmer weather     Family History   Problem Relation Age of Onset     Diabetes Sister      History   Drug Use Unknown         Objective     /76   Pulse 51   Temp 96.9  F (36.1  C) (Tympanic)   Resp 14   Ht 1.803 m (5' 11\")   Wt 117.3 kg (258 lb 9.6 oz)   SpO2 99%   BMI 36.07 kg/m      Physical Exam  GENERAL APPEARANCE: morbidly obese, alert and no distress; ambulatory w/o assist  EYES: pink conj, no icterus, PERRL, EOMI  HENT: ear canals and TM's normal, nose and mouth without ulcers or lesions, oropharynx clear and oral mucous membranes moist  NECK: no adenopathy, no asymmetry, masses, or scars and thyroid normal to palpation  RESP: lungs clear to auscultation - no rales, rhonchi or wheezes  CV: regular rates and rhythm, normal S1 S2, no S3 or S4, no murmur, click or rub, no peripheral edema and peripheral pulses strong  ABDOMEN: soft, nontender, no hepatosplenomegaly, no masses and bowel sounds normal  MS: no musculoskeletal defects are noted and gait is age appropriate without ataxia  SKIN: good turgor, no rash/jaundice/ecchymosis  NEURO: Normal strength and tone, sensory exam grossly normal, mentation intact and speech normal    No " results for input(s): HGB, PLT, INR, NA, POTASSIUM, CR, A1C in the last 80645 hours.     Diagnostics:  Labs pending at this time.  Results will be reviewed when available.   No EKG required, no history of coronary heart disease, significant arrhythmia, peripheral arterial disease or other structural heart disease.    Revised Cardiac Risk Index (RCRI):  The patient has the following serious cardiovascular risks for perioperative complications:   - No serious cardiac risks = 0 points     RCRI Interpretation: 0 points: Class I (very low risk - 0.4% complication rate)           Signed Electronically by: Gilberto Alonzo MD  Copy of this evaluation report is provided to requesting physician.

## 2021-11-30 ENCOUNTER — ANESTHESIA EVENT (OUTPATIENT)
Dept: SURGERY | Facility: CLINIC | Age: 76
End: 2021-11-30
Payer: COMMERCIAL

## 2021-11-30 LAB — SARS-COV-2 RNA RESP QL NAA+PROBE: NEGATIVE

## 2021-11-30 ASSESSMENT — LIFESTYLE VARIABLES: TOBACCO_USE: 1

## 2021-12-01 NOTE — ANESTHESIA PREPROCEDURE EVALUATION
Anesthesia Pre-Procedure Evaluation    Patient: Anil Gutierres Jr.   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]    Procedure : Procedure(s):  GLOSSECTOMY, PARTIAL          Past Medical History:   Diagnosis Date     Arthritis 2018    L knee, R hip     Basal cell carcinoma      Diabetes (H)     Type 2     Hypertension      Thyroid disease     Hypothyroidism      Past Surgical History:   Procedure Laterality Date     BIOPSY      Tongue     COLONOSCOPY  2017     EYE SURGERY  2019    cataracts     ORTHOPEDIC SURGERY  dec  23, 2020    R Hip      Allergies   Allergen Reactions     Amoxicillin GI Disturbance     N, V,  D     when     took   for   dental prophylaxis      Seasonal Allergies      Simvastatin Muscle Pain (Myalgia)      Social History     Tobacco Use     Smoking status: Former Smoker     Packs/day: 1.00     Years: 20.00     Pack years: 20.00     Types: Cigarettes     Start date: 1964     Quit date: 1984     Years since quittin.9     Smokeless tobacco: Never Used   Substance Use Topics     Alcohol use: Yes     Comment: one beer or mixed drink a day in warmer weather      Wt Readings from Last 1 Encounters:   21 117.3 kg (258 lb 9.6 oz)        Anesthesia Evaluation   Pt has had prior anesthetic. Type: General and MAC.    No history of anesthetic complications       ROS/MED HX  ENT/Pulmonary:     (+) MELVI risk factors, snores loudly, obese, tobacco use, Past use,     Neurologic:  - neg neurologic ROS     Cardiovascular:     (+) Dyslipidemia hypertension-----    METS/Exercise Tolerance:     Hematologic:       Musculoskeletal:   (+) arthritis,     GI/Hepatic:  - neg GI/hepatic ROS     Renal/Genitourinary:  - neg Renal ROS     Endo:     (+) type I DM, thyroid problem, hypothyroidism,     Psychiatric/Substance Use:  - neg psychiatric ROS     Infectious Disease:  - neg infectious disease ROS      Malignancy:   (+) Malignancy, History of Skin.    Other:  - neg other ROS          Physical Exam    Airway        Mallampati: III   TM distance: < 3 FB    Mouth opening: > 3 cm    Respiratory Devices and Support         Dental  no notable dental history         Cardiovascular   cardiovascular exam normal       Rhythm and rate: regular and normal     Pulmonary   pulmonary exam normal        breath sounds clear to auscultation           OUTSIDE LABS:  CBC:   Lab Results   Component Value Date    WBC 8.8 11/06/2018    HGB 14.4 11/06/2018    HCT 43.3 11/06/2018     11/06/2018     BMP:   Lab Results   Component Value Date     11/29/2021     11/06/2018    POTASSIUM 4.4 11/29/2021    POTASSIUM 4.5 11/06/2018    CHLORIDE 106 11/29/2021    CHLORIDE 104 11/06/2018    CO2 24 11/29/2021    CO2 25 11/06/2018    BUN 34 (H) 11/29/2021    BUN 36 (H) 11/06/2018    CR 1.62 (H) 11/29/2021    CR 1.46 (H) 11/06/2018     (H) 11/29/2021     (H) 11/06/2018     COAGS: No results found for: PTT, INR, FIBR  POC: No results found for: BGM, HCG, HCGS  HEPATIC: No results found for: ALBUMIN, PROTTOTAL, ALT, AST, GGT, ALKPHOS, BILITOTAL, BILIDIRECT, MORGAN  OTHER:   Lab Results   Component Value Date    A1C 6.3 (H) 11/29/2021    MILKA 10.0 11/29/2021       Anesthesia Plan    ASA Status:  2   NPO Status:  NPO Appropriate    Anesthesia Type: General.     - Airway: ETT   Induction: Intravenous, Propofol.   Maintenance: Balanced.        Consents    Anesthesia Plan(s) and associated risks, benefits, and realistic alternatives discussed. Questions answered and patient/representative(s) expressed understanding.     - Discussed: Risks, Benefits and Alternatives for BOTH SEDATION and the PROCEDURE were discussed     - Discussed with:  Patient      - Extended Intubation/Ventilatory Support Discussed: No.      - Patient is DNR/DNI Status: No    Use of blood products discussed: No .     Postoperative Care    Pain management:  Oral pain medications, IV analgesics.   PONV prophylaxis: Ondansetron (or other 5HT-3), Dexamethasone or Solumedrol     Comments:                Cm Rodriguez CRNA, APRN CRNA

## 2021-12-02 ENCOUNTER — HOSPITAL ENCOUNTER (OUTPATIENT)
Facility: CLINIC | Age: 76
Discharge: HOME | End: 2021-12-02
Attending: OTOLARYNGOLOGY | Admitting: OTOLARYNGOLOGY
Payer: COMMERCIAL

## 2021-12-02 ENCOUNTER — ANESTHESIA (OUTPATIENT)
Dept: SURGERY | Facility: CLINIC | Age: 76
End: 2021-12-02
Payer: COMMERCIAL

## 2021-12-02 VITALS
DIASTOLIC BLOOD PRESSURE: 51 MMHG | OXYGEN SATURATION: 94 % | RESPIRATION RATE: 16 BRPM | SYSTOLIC BLOOD PRESSURE: 106 MMHG | HEIGHT: 71 IN | TEMPERATURE: 97.8 F | HEART RATE: 55 BPM | BODY MASS INDEX: 36.12 KG/M2 | WEIGHT: 258 LBS

## 2021-12-02 DIAGNOSIS — K13.79 ACQUIRED DYSPLASIA OF ORAL CAVITY: ICD-10-CM

## 2021-12-02 DIAGNOSIS — K14.8 TONGUE LESION: Primary | ICD-10-CM

## 2021-12-02 LAB
GLUCOSE BLDC GLUCOMTR-MCNC: 142 MG/DL (ref 70–99)
GLUCOSE BLDC GLUCOMTR-MCNC: 157 MG/DL (ref 70–99)

## 2021-12-02 PROCEDURE — 360N000076 HC SURGERY LEVEL 3, PER MIN: Performed by: OTOLARYNGOLOGY

## 2021-12-02 PROCEDURE — 250N000011 HC RX IP 250 OP 636: Performed by: NURSE ANESTHETIST, CERTIFIED REGISTERED

## 2021-12-02 PROCEDURE — 370N000017 HC ANESTHESIA TECHNICAL FEE, PER MIN: Performed by: OTOLARYNGOLOGY

## 2021-12-02 PROCEDURE — 710N000012 HC RECOVERY PHASE 2, PER MINUTE: Performed by: OTOLARYNGOLOGY

## 2021-12-02 PROCEDURE — 258N000003 HC RX IP 258 OP 636: Performed by: NURSE ANESTHETIST, CERTIFIED REGISTERED

## 2021-12-02 PROCEDURE — 250N000013 HC RX MED GY IP 250 OP 250 PS 637: Performed by: OTOLARYNGOLOGY

## 2021-12-02 PROCEDURE — 250N000009 HC RX 250: Performed by: OTOLARYNGOLOGY

## 2021-12-02 PROCEDURE — 250N000009 HC RX 250: Performed by: NURSE ANESTHETIST, CERTIFIED REGISTERED

## 2021-12-02 PROCEDURE — 250N000025 HC SEVOFLURANE, PER MIN: Performed by: OTOLARYNGOLOGY

## 2021-12-02 PROCEDURE — 88307 TISSUE EXAM BY PATHOLOGIST: CPT | Mod: TC | Performed by: OTOLARYNGOLOGY

## 2021-12-02 PROCEDURE — 710N000010 HC RECOVERY PHASE 1, LEVEL 2, PER MIN: Performed by: OTOLARYNGOLOGY

## 2021-12-02 PROCEDURE — 999N000141 HC STATISTIC PRE-PROCEDURE NURSING ASSESSMENT: Performed by: OTOLARYNGOLOGY

## 2021-12-02 PROCEDURE — 82962 GLUCOSE BLOOD TEST: CPT

## 2021-12-02 PROCEDURE — 41120 PARTIAL REMOVAL OF TONGUE: CPT | Performed by: OTOLARYNGOLOGY

## 2021-12-02 RX ORDER — FENTANYL CITRATE 50 UG/ML
25 INJECTION, SOLUTION INTRAMUSCULAR; INTRAVENOUS EVERY 5 MIN PRN
Status: DISCONTINUED | OUTPATIENT
Start: 2021-12-02 | End: 2021-12-02 | Stop reason: HOSPADM

## 2021-12-02 RX ORDER — IBUPROFEN 200 MG
600 TABLET ORAL EVERY 6 HOURS
Qty: 200 TABLET | Refills: 1 | Status: SHIPPED | OUTPATIENT
Start: 2021-12-02 | End: 2021-12-12

## 2021-12-02 RX ORDER — ONDANSETRON 4 MG/1
4 TABLET, ORALLY DISINTEGRATING ORAL
Status: DISCONTINUED | OUTPATIENT
Start: 2021-12-02 | End: 2021-12-02 | Stop reason: HOSPADM

## 2021-12-02 RX ORDER — NALOXONE HYDROCHLORIDE 0.4 MG/ML
0.2 INJECTION, SOLUTION INTRAMUSCULAR; INTRAVENOUS; SUBCUTANEOUS
Status: DISCONTINUED | OUTPATIENT
Start: 2021-12-02 | End: 2021-12-02 | Stop reason: HOSPADM

## 2021-12-02 RX ORDER — LIDOCAINE 40 MG/G
CREAM TOPICAL
Status: DISCONTINUED | OUTPATIENT
Start: 2021-12-02 | End: 2021-12-02 | Stop reason: HOSPADM

## 2021-12-02 RX ORDER — ACETAMINOPHEN 325 MG/1
975 TABLET ORAL ONCE
Status: COMPLETED | OUTPATIENT
Start: 2021-12-02 | End: 2021-12-02

## 2021-12-02 RX ORDER — PROPOFOL 10 MG/ML
INJECTION, EMULSION INTRAVENOUS PRN
Status: DISCONTINUED | OUTPATIENT
Start: 2021-12-02 | End: 2021-12-02

## 2021-12-02 RX ORDER — DEXAMETHASONE SODIUM PHOSPHATE 4 MG/ML
INJECTION, SOLUTION INTRA-ARTICULAR; INTRALESIONAL; INTRAMUSCULAR; INTRAVENOUS; SOFT TISSUE PRN
Status: DISCONTINUED | OUTPATIENT
Start: 2021-12-02 | End: 2021-12-02

## 2021-12-02 RX ORDER — ONDANSETRON 4 MG/1
4 TABLET, ORALLY DISINTEGRATING ORAL EVERY 30 MIN PRN
Status: DISCONTINUED | OUTPATIENT
Start: 2021-12-02 | End: 2021-12-02 | Stop reason: HOSPADM

## 2021-12-02 RX ORDER — PHENYLEPHRINE HYDROCHLORIDE 10 MG/ML
INJECTION INTRAVENOUS PRN
Status: DISCONTINUED | OUTPATIENT
Start: 2021-12-02 | End: 2021-12-02

## 2021-12-02 RX ORDER — FENTANYL CITRATE 50 UG/ML
INJECTION, SOLUTION INTRAMUSCULAR; INTRAVENOUS PRN
Status: DISCONTINUED | OUTPATIENT
Start: 2021-12-02 | End: 2021-12-02

## 2021-12-02 RX ORDER — ACETAMINOPHEN 500 MG
1000 TABLET ORAL EVERY 6 HOURS
Qty: 200 TABLET | Refills: 1 | Status: SHIPPED | OUTPATIENT
Start: 2021-12-02 | End: 2021-12-12

## 2021-12-02 RX ORDER — LIDOCAINE HYDROCHLORIDE 10 MG/ML
INJECTION, SOLUTION INFILTRATION; PERINEURAL PRN
Status: DISCONTINUED | OUTPATIENT
Start: 2021-12-02 | End: 2021-12-02

## 2021-12-02 RX ORDER — HYDROMORPHONE HCL IN WATER/PF 6 MG/30 ML
0.2 PATIENT CONTROLLED ANALGESIA SYRINGE INTRAVENOUS EVERY 5 MIN PRN
Status: DISCONTINUED | OUTPATIENT
Start: 2021-12-02 | End: 2021-12-02 | Stop reason: HOSPADM

## 2021-12-02 RX ORDER — SODIUM CHLORIDE, SODIUM LACTATE, POTASSIUM CHLORIDE, CALCIUM CHLORIDE 600; 310; 30; 20 MG/100ML; MG/100ML; MG/100ML; MG/100ML
INJECTION, SOLUTION INTRAVENOUS CONTINUOUS
Status: DISCONTINUED | OUTPATIENT
Start: 2021-12-02 | End: 2021-12-02 | Stop reason: HOSPADM

## 2021-12-02 RX ORDER — EPHEDRINE SULFATE 50 MG/ML
INJECTION, SOLUTION INTRAVENOUS PRN
Status: DISCONTINUED | OUTPATIENT
Start: 2021-12-02 | End: 2021-12-02

## 2021-12-02 RX ORDER — LIDOCAINE HYDROCHLORIDE AND EPINEPHRINE 10; 10 MG/ML; UG/ML
INJECTION, SOLUTION INFILTRATION; PERINEURAL PRN
Status: DISCONTINUED | OUTPATIENT
Start: 2021-12-02 | End: 2021-12-02 | Stop reason: HOSPADM

## 2021-12-02 RX ORDER — ONDANSETRON 2 MG/ML
INJECTION INTRAMUSCULAR; INTRAVENOUS PRN
Status: DISCONTINUED | OUTPATIENT
Start: 2021-12-02 | End: 2021-12-02

## 2021-12-02 RX ORDER — MEPERIDINE HYDROCHLORIDE 25 MG/ML
12.5 INJECTION INTRAMUSCULAR; INTRAVENOUS; SUBCUTANEOUS
Status: DISCONTINUED | OUTPATIENT
Start: 2021-12-02 | End: 2021-12-02 | Stop reason: HOSPADM

## 2021-12-02 RX ORDER — ACETAMINOPHEN 325 MG/1
975 TABLET ORAL ONCE
Status: DISCONTINUED | OUTPATIENT
Start: 2021-12-02 | End: 2021-12-02

## 2021-12-02 RX ORDER — NALOXONE HYDROCHLORIDE 0.4 MG/ML
0.4 INJECTION, SOLUTION INTRAMUSCULAR; INTRAVENOUS; SUBCUTANEOUS
Status: DISCONTINUED | OUTPATIENT
Start: 2021-12-02 | End: 2021-12-02 | Stop reason: HOSPADM

## 2021-12-02 RX ORDER — ONDANSETRON 2 MG/ML
4 INJECTION INTRAMUSCULAR; INTRAVENOUS EVERY 30 MIN PRN
Status: DISCONTINUED | OUTPATIENT
Start: 2021-12-02 | End: 2021-12-02 | Stop reason: HOSPADM

## 2021-12-02 RX ORDER — OXYCODONE HYDROCHLORIDE 5 MG/1
5 TABLET ORAL EVERY 4 HOURS PRN
Status: DISCONTINUED | OUTPATIENT
Start: 2021-12-02 | End: 2021-12-02 | Stop reason: HOSPADM

## 2021-12-02 RX ORDER — SENNA AND DOCUSATE SODIUM 50; 8.6 MG/1; MG/1
1 TABLET, FILM COATED ORAL AT BEDTIME
Qty: 30 TABLET | Refills: 1 | Status: SHIPPED | OUTPATIENT
Start: 2021-12-02 | End: 2022-01-03

## 2021-12-02 RX ORDER — OXYCODONE HYDROCHLORIDE 5 MG/1
5 TABLET ORAL EVERY 4 HOURS PRN
Qty: 30 TABLET | Refills: 0 | Status: SHIPPED | OUTPATIENT
Start: 2021-12-02 | End: 2021-12-12

## 2021-12-02 RX ORDER — FENTANYL CITRATE 50 UG/ML
25 INJECTION, SOLUTION INTRAMUSCULAR; INTRAVENOUS
Status: DISCONTINUED | OUTPATIENT
Start: 2021-12-02 | End: 2021-12-02 | Stop reason: HOSPADM

## 2021-12-02 RX ORDER — OXYCODONE HYDROCHLORIDE 5 MG/1
5 TABLET ORAL
Status: DISCONTINUED | OUTPATIENT
Start: 2021-12-02 | End: 2021-12-02 | Stop reason: HOSPADM

## 2021-12-02 RX ORDER — IBUPROFEN 200 MG
400 TABLET ORAL
Status: DISCONTINUED | OUTPATIENT
Start: 2021-12-02 | End: 2021-12-02 | Stop reason: HOSPADM

## 2021-12-02 RX ADMIN — EPHEDRINE SULFATE 10 MG: 50 INJECTION, SOLUTION INTRAVENOUS at 10:46

## 2021-12-02 RX ADMIN — LIDOCAINE HYDROCHLORIDE 40 MG: 10 INJECTION, SOLUTION INFILTRATION; PERINEURAL at 10:18

## 2021-12-02 RX ADMIN — PHENYLEPHRINE HYDROCHLORIDE 100 MCG: 10 INJECTION INTRAVENOUS at 10:26

## 2021-12-02 RX ADMIN — ROCURONIUM BROMIDE 50 MG: 50 INJECTION, SOLUTION INTRAVENOUS at 10:18

## 2021-12-02 RX ADMIN — EPHEDRINE SULFATE 10 MG: 50 INJECTION, SOLUTION INTRAVENOUS at 10:42

## 2021-12-02 RX ADMIN — PHENYLEPHRINE HYDROCHLORIDE 100 MCG: 10 INJECTION INTRAVENOUS at 10:40

## 2021-12-02 RX ADMIN — PHENYLEPHRINE HYDROCHLORIDE 100 MCG: 10 INJECTION INTRAVENOUS at 10:30

## 2021-12-02 RX ADMIN — FENTANYL CITRATE 100 MCG: 50 INJECTION, SOLUTION INTRAMUSCULAR; INTRAVENOUS at 10:18

## 2021-12-02 RX ADMIN — DEXAMETHASONE SODIUM PHOSPHATE 10 MG: 4 INJECTION, SOLUTION INTRA-ARTICULAR; INTRALESIONAL; INTRAMUSCULAR; INTRAVENOUS; SOFT TISSUE at 10:18

## 2021-12-02 RX ADMIN — EPHEDRINE SULFATE 10 MG: 50 INJECTION, SOLUTION INTRAVENOUS at 10:39

## 2021-12-02 RX ADMIN — SODIUM CHLORIDE, POTASSIUM CHLORIDE, SODIUM LACTATE AND CALCIUM CHLORIDE: 600; 310; 30; 20 INJECTION, SOLUTION INTRAVENOUS at 10:09

## 2021-12-02 RX ADMIN — MIDAZOLAM 2 MG: 1 INJECTION INTRAMUSCULAR; INTRAVENOUS at 10:15

## 2021-12-02 RX ADMIN — ONDANSETRON 4 MG: 2 INJECTION INTRAMUSCULAR; INTRAVENOUS at 10:18

## 2021-12-02 RX ADMIN — LIDOCAINE HYDROCHLORIDE 0.1 ML: 10 INJECTION, SOLUTION EPIDURAL; INFILTRATION; INTRACAUDAL; PERINEURAL at 10:09

## 2021-12-02 RX ADMIN — PROPOFOL 200 MG: 10 INJECTION, EMULSION INTRAVENOUS at 10:18

## 2021-12-02 RX ADMIN — PHENYLEPHRINE HYDROCHLORIDE 100 MCG: 10 INJECTION INTRAVENOUS at 10:43

## 2021-12-02 RX ADMIN — PHENYLEPHRINE HYDROCHLORIDE 100 MCG: 10 INJECTION INTRAVENOUS at 10:35

## 2021-12-02 RX ADMIN — ACETAMINOPHEN 975 MG: 325 TABLET, FILM COATED ORAL at 10:10

## 2021-12-02 ASSESSMENT — MIFFLIN-ST. JEOR: SCORE: 1932.41

## 2021-12-02 NOTE — ANESTHESIA CARE TRANSFER NOTE
Patient: Anil Gutierres Jr.    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    Anesthesia Type:   General     Note:    Oropharynx: oropharynx clear of all foreign objects and spontaneously breathing  Level of Consciousness: awake  Oxygen Supplementation: nasal cannula    Independent Airway: airway patency satisfactory and stable  Dentition: dentition unchanged  Vital Signs Stable: post-procedure vital signs reviewed and stable  Report to RN Given: handoff report given  Patient transferred to: PACU    Handoff Report: Identifed the Patient, Identified the Reponsible Provider, Reviewed the pertinent medical history, Discussed the surgical course, Reviewed Intra-OP anesthesia mangement and issues during anesthesia, Set expectations for post-procedure period and Allowed opportunity for questions and acknowledgement of understanding      Vitals:  Vitals Value Taken Time   /68 12/02/21 1101   Temp     Pulse 72 12/02/21 1101   Resp     SpO2 90 % 12/02/21 1103   Vitals shown include unvalidated device data.    Electronically Signed By: Cm Rodriguez CRNA, APRN CRNA  December 2, 2021  11:04 AM

## 2021-12-02 NOTE — DISCHARGE INSTRUCTIONS
Postoperative Care for Intraoral Surgery     Recovery:  1. The pain and swelling almost always gets worse before it gets better, this is normal.  Usually it peaks 3 to 5 days after the surgery, and then begins improving at 7 to 8 days after surgery.  Of course, this is variable from person to person.  2. Maintain a strict soft diet for the first two weeks. Avoid hard or crunchy things.  If it makes a noise when you bite it, it is too hard; or if it requires much chewing, it is too hard.  Although it is good to begin eating again from day one, it is not unusual to not eat for several days after the procedure.  The most important thing is staying hydrated.  Drink plenty of fluids, with electrolytes if possible, such as dilute sports drinks.  3. The liquid pain medication you were sent home with can make some people very nauseated.  To minimize this, avoid taking it on an empty stomach, or take smaller doses. You may also take the anti-nausea medication prescribed.  4. Try to stay ahead of the pain.  In other words, do not wait for pain medication to completely wear off before taking more pain medicine.  Instead, take the medication every 4 hours, even if it requires setting an alarm clock at night.  This is especially helpful during the first 5-7 days. You should also add tylenol (and possibly ibuprofen if needed) to help with pain.  5. Temporary tongue numbness or taste disturbance is common, and will go away with time. Foul breath is common, and is part of the healing process. This too will go away with time.   6. You may brush your teeth gently, but avoid brushing or hitting your wound with the toothbrush. An electric toothbrush can allow brushing without motion that can bump the surgical site.   7. Do not smoke while healing as this greatly impairs wound healing and increases your risk of wound complications including: infection, wound breakdown with fistula, and bleeding.     Activity - Avoid heavy lifting  (greater than 20 pounds) or strenuous exercise until the follow up appointment.  Also, try to sleep with your head elevated.       Oral Care - You should rinse your mouth out with sodium bicarbonate 4 times daily or anytime after you eat when you begin taking food by mouth.  To make this, mix one tablespoon of baking soda in one liter of water. Refrigerate, it is good for one week.    Medications - Except blood thinners, almost all medication can be re-started after surgery.       Complications - Bleeding is the most common serious complication after surgery.  If there are a few small drops or streaks of blood in the saliva that then goes away, this can be observed.  Gentle gargling with the ice water can also help stop this minor bleeding.  However, if the bleeding is persistent, or heavy bleeding occurs, do not hesitate.  Go to the emergency room to be evaluated.     Follow up - I like to see my patients approximately 3-4 weeks after the procedure to make sure that everything has healed appropriately.     If there are any questions or issues with the above, or if there are other issues that concern you, always feel free to call the clinic and I am happy to speak with you as soon as feasible.     Rojelio Horton MD  Department of Otolaryngology-Head and Neck Surgery  [facility]  [phone] or [phone] After hours, [facility] option

## 2021-12-02 NOTE — DISCHARGE INSTR - SUPPLEMENTARY INSTRUCTIONS
Same Day Surgery Discharge Instructions        ________________________________________________________________________________________________  IMPORTANT NUMBERS:    [facility] Number:  [phone], [phone]  Pharmacy:  [phone]  Same Day Surgery:  [phone], Monday - Friday until 8:30 p.m.  Urgent Care:  [phone]  Emergency Room:  [phone]      [facility]:  [phone]                                                                             [facility]:  [phone] option 1  [facility]:  [phone]     [facility]:  [phone]   [facility]:  [phone]   Home Medical Equipment: [phone]  [facility]:  [phone]

## 2021-12-02 NOTE — ANESTHESIA POSTPROCEDURE EVALUATION
Patient: Anil Gutierres Jr.    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    Anesthesia Type:  General    Note:  Disposition: Outpatient   Postop Pain Control: Uneventful            Sign Out: Well controlled pain   PONV: No   Neuro/Psych: Uneventful            Sign Out: Acceptable/Baseline neuro status   Airway/Respiratory: Uneventful            Sign Out: Acceptable/Baseline resp. status   CV/Hemodynamics: Uneventful            Sign Out: Acceptable CV status; No obvious hypovolemia; No obvious fluid overload   Other NRE: NONE   DID A NON-ROUTINE EVENT OCCUR? No           Last vitals:  Vitals Value Taken Time   /57 12/02/21 1200   Temp     Pulse 54 12/02/21 1211   Resp 12 12/02/21 1211   SpO2 95 % 12/02/21 1211   Vitals shown include unvalidated device data.    Electronically Signed By: Cm Rodriguez CRNA, APRN CRNA  December 2, 2021  12:16 PM

## 2021-12-02 NOTE — OP NOTE
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     POSTOPERATIVE DIAGNOSES: Same.      PROCEDURE PERFORMED:   1. Right anterior partial glossectomy (final defect 3 cm anteroposterior x 2 cm craniocaudal)      SURGEON: Rojleio Horton MD      ASSISTANTS: None.     BLOOD LOSS: Less than 10 mL.      COMPLICATIONS: None.      SPECIMENS: Right anterior ventral oral tongue with stitch anterior..     ANESTHESIA: General.     GRAFTS, IMPLANTS, DRAINS: None.     INDICATIONS: Remove lesion, prevent complications, treat disease.     FINDINGS:   1. 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  No sierra ulceration or mass in the tongue.      OPERATIVE TECHNIQUE: The patient was brought to the operating room and identified by name clinic number.  They were placed supinely on the operating room table and general endotracheal anesthesia was induced by the anesthesia service.  The patient was prepped and draped in standard fashion.    The right oral tongue lesion was injected with 1% lidocaine with 1:100,000 epinephrine.  After standard surgical pause, the right oral tongue was exposed.  An oxygen pause was performed to verify inspired oxygen was less than 30%. A 2-0 chromic suture was used anteriorly to help with tongue retraction. A 15 blade was used to make elliptical incision around the lesion with a healthy 5 mm margin.  The incision was made in the tongue musculature.  Submucosal dissection was made using an iris scissors.  The specimen was passed off and labeled for permanent pathology with a stitch anteriorly.  Hemostasis was achieved using bipolar cautery at 20 W.  The right lateral tongue incision was then closed with multiple interrupted 3-0 Vicryl sutures.  The chromic stitch was removed from the tongue. All hardware was removed from the mouth.     This marked the end of the procedure.  The patient was then  turned over to anesthesia for recovery where they were awakened, extubated, and transferred to the PACU in excellent condition.  There were no complications.  There was minimal blood loss.  All standard operating room protocol and universal precautions were used throughout the procedure.     Rojelio Horton MD  Department of Otolaryngology-Head and Neck Surgery  [facility]

## 2021-12-07 LAB
PATH REPORT.COMMENTS IMP SPEC: NORMAL
PATH REPORT.FINAL DX SPEC: NORMAL
PATH REPORT.GROSS SPEC: NORMAL
PATH REPORT.MICROSCOPIC SPEC OTHER STN: NORMAL
PATH REPORT.RELEVANT HX SPEC: NORMAL
PHOTO IMAGE: NORMAL

## 2021-12-07 PROCEDURE — 88307 TISSUE EXAM BY PATHOLOGIST: CPT | Mod: 26 | Performed by: PATHOLOGY

## 2021-12-09 ENCOUNTER — TELEPHONE (OUTPATIENT)
Dept: OTOLARYNGOLOGY | Facility: CLINIC | Age: 76
End: 2021-12-09
Payer: COMMERCIAL

## 2021-12-09 NOTE — TELEPHONE ENCOUNTER
----- Message from Rojelio Horton MD sent at 12/8/2021  7:29 AM CST -----  Regarding: Path Results  Can we contact the patient and let him know that his specimen was negative for cancer?  There was some mild dysplasia, this was completely removed with negative margins on the specimen.    Portion of tongue, right anterior, partial glossectomy:  - Focal mild squamous cell dysplasia, without extension to margins.    IJL

## 2021-12-09 NOTE — TELEPHONE ENCOUNTER
Spoke with patient and reviewed results. Pt verbalized understanding.  Eloisa MARIE RN BSN PHN  [facility]

## 2021-12-29 NOTE — PROGRESS NOTES
Chief Complaint   Patient presents with     Post-op Visit     partial glossectomy on 12/2/21- no concerns today     History of Present Illness  Anil Gutierres Jr. is a 76 year old male who presents today for follow-up.  The patient previously underwent an outside biopsy by his dental professional. 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 Given this, we went to the OR for definitive excision. The patient went to the operating room and underwent right partial glossectomy on 12/2/2021.  The patient had a normal postoperative course.  There was no postoperative evidence of bleeding.  Final pathology showed focal mild squamous cell dysplasia, without extension to margins. He returns today for follow up.    From a symptom standpoint, the patient reports his tongue is completely healed.  He is eating and swallowing well.  He has returned to a normal diet.  He is otherwise doing well without any ENT concerns.    Past Medical History  There is no problem list on file for this patient.    Current Medications    Current Outpatient Medications:      aspirin 81 MG EC tablet, Take 81 mg by mouth daily, Disp: , Rfl:      atenolol (TENORMIN) 50 MG tablet, Take 50 mg by mouth daily, Disp: , Rfl:      fluocinonide (LIDEX) 0.05 % external cream, Apply topically 2 times daily, Disp: 120 g, Rfl: 3     furosemide (LASIX) 20 MG tablet, Take 20 mg by mouth as needed, Disp: , Rfl:      levothyroxine (SYNTHROID/LEVOTHROID) 137 MCG tablet, Take 137 mcg by mouth, Disp: , Rfl:      lisinopril (ZESTRIL) 20 MG tablet, Take 20 mg by mouth Taking 1/2 tablet per day, Disp: , Rfl:      loratadine (CLARITIN) 10 MG tablet, Take 10 mg by mouth , Disp: , Rfl:      clindamycin (CLEOCIN) 300 MG capsule, Take 300 mg by mouth 4 times daily (Patient not taking: Reported on 11/29/2021), Disp: , Rfl:       Glucose Blood (BLOOD GLUCOSE TEST STRIPS) STRP, by In Vitro route daily, Disp: , Rfl:     Allergies  Allergies   Allergen Reactions     Amoxicillin GI Disturbance     N, V,  D     when     took   for   dental prophylaxis      Seasonal Allergies      Simvastatin Muscle Pain (Myalgia)       Social History  Social History     Socioeconomic History     Marital status:      Spouse name: Not on file     Number of children: Not on file     Years of education: Not on file     Highest education level: Not on file   Occupational History     Not on file   Tobacco Use     Smoking status: Former Smoker     Packs/day: 1.00     Years: 20.00     Pack years: 20.00     Types: Cigarettes     Start date: 1964     Quit date: 1984     Years since quittin.0     Smokeless tobacco: Never Used   Vaping Use     Vaping Use: Never used   Substance and Sexual Activity     Alcohol use: Yes     Comment: one beer or mixed drink a day in warmer weather     Drug use: Never     Sexual activity: Not Currently     Partners: Female     Birth control/protection: None   Other Topics Concern     Not on file   Social History Narrative     Not on file     Social Determinants of Health     Financial Resource Strain: Not on file   Food Insecurity: Not on file   Transportation Needs: Not on file   Physical Activity: Not on file   Stress: Not on file   Social Connections: Not on file   Intimate Partner Violence: Not on file   Housing Stability: Not on file       Family History  Family History   Problem Relation Age of Onset     Diabetes Sister        Review of Systems  As per HPI and PMHx, otherwise 10 system review including the head and neck, constitutional, eyes, respiratory, GI, skin, neurologic, lymphatic, endocrine, and allergy systems is negative.    Physical Exam  BP (!) 141/70 (BP Location: Right arm, Patient Position: Chair, Cuff Size: Adult Large)   Pulse (!) 49   Temp 98.2  F (36.8  C) (Tympanic)   Wt 117 kg (258 lb)   BMI  35.98 kg/m    GENERAL: Patient is a pleasant, cooperative 76 year old male in no acute distress.  HEAD: Normocephalic, atraumatic.  Hair and scalp are normal.  EYES: Pupils are equal, round, reactive to light and accommodation.  Extraocular movements are intact.  The sclera nonicteric without injection.  The extraocular structures are normal.  EARS: Normal shape and symmetry.  No tenderness when palpating the mastoid or tragal areas bilaterally.    NOSE: Nares are patent.  Nasal mucosa is pink and moist.  Negative anterior rhinoscopy.  ORAL CAVITY: Dentition is in age-appropriate repair.  Mucous membranes are moist.  The right partial glossectomy site has healed well. The tongue is mobile, protrudes to the midline.  Palate elevates symmetrically.  No oral cavity or oropharyngeal masses, lesions, ulcerations, leukoplakia.  NEUROLOGIC: Cranial nerves II through XII are grossly intact.  Voice is strong.  Patient is House-Brackman I/VI bilaterally.  CARDIOVASCULAR: Extremities are warm and well-perfused.  No significant peripheral edema.  RESPIRATORY: Patient has nonlabored breathing without cough, wheeze, stridor.  PSYCHIATRIC: Patient is alert and oriented.  Mood and affect appear normal.  SKIN: Warm and dry.  No scalp, face, or neck lesions noted.    Assessment and Plan     ICD-10-CM    1. 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 Tongue lesion  K14.8    3. Status post partial glossectomy  Z98.890    4. Postoperative state  Z98.890       It was my pleasure seeing Anil Gutierres  today in clinic.  The patient has healed well after hist right partial glossectomy.  I would recommend observation at this time.  The patient can return to clinic in 6 months for follow up examination.  He can contact me as needed with problems or concerns.    Don to follow up with Primary Care provider regarding elevated blood pressure.    Rojelio Horton MD  Department of Otolaryngology-Head and Neck Surgery  [facility]

## 2022-01-03 ENCOUNTER — OFFICE VISIT (OUTPATIENT)
Dept: OTOLARYNGOLOGY | Facility: CLINIC | Age: 77
End: 2022-01-03
Payer: COMMERCIAL

## 2022-01-03 VITALS
WEIGHT: 258 LBS | TEMPERATURE: 98.2 F | HEART RATE: 49 BPM | SYSTOLIC BLOOD PRESSURE: 141 MMHG | DIASTOLIC BLOOD PRESSURE: 70 MMHG | BODY MASS INDEX: 35.98 KG/M2

## 2022-01-03 DIAGNOSIS — Z98.890 STATUS POST PARTIAL GLOSSECTOMY: ICD-10-CM

## 2022-01-03 DIAGNOSIS — Z98.890 POSTOPERATIVE STATE: ICD-10-CM

## 2022-01-03 DIAGNOSIS — K14.8 TONGUE LESION: ICD-10-CM

## 2022-01-03 DIAGNOSIS — K13.79 ACQUIRED DYSPLASIA OF ORAL CAVITY: Primary | ICD-10-CM

## 2022-01-03 PROCEDURE — 99024 POSTOP FOLLOW-UP VISIT: CPT | Performed by: OTOLARYNGOLOGY

## 2022-01-03 ASSESSMENT — PAIN SCALES - GENERAL: PAINLEVEL: NO PAIN (0)

## 2022-01-03 NOTE — LETTER
1/3/2022         RE: Anil Gutierres Jr.  [address] [facility] [address]        Dear Colleague,    Thank you for referring your patient, Anil Gutierres Jr., to the [facility]. Please see a copy of my visit note below.    Chief Complaint   Patient presents with     Post-op Visit     partial glossectomy on 12/2/21- no concerns today     History of Present Illness  Anil Gutierres Jr. is a 76 year old male who presents today for follow-up.  The patient previously underwent an outside biopsy by his dental professional. 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 Given this, we went to the OR for definitive excision. The patient went to the operating room and underwent right partial glossectomy on 12/2/2021.  The patient had a normal postoperative course.  There was no postoperative evidence of bleeding.  Final pathology showed focal mild squamous cell dysplasia, without extension to margins. He returns today for follow up.    From a symptom standpoint, the patient reports his tongue is completely healed.  He is eating and swallowing well.  He has returned to a normal diet.  He is otherwise doing well without any ENT concerns.    Past Medical History  There is no problem list on file for this patient.    Current Medications    Current Outpatient Medications:      aspirin 81 MG EC tablet, Take 81 mg by mouth daily, Disp: , Rfl:      atenolol (TENORMIN) 50 MG tablet, Take 50 mg by mouth daily, Disp: , Rfl:      fluocinonide (LIDEX) 0.05 % external cream, Apply topically 2 times daily, Disp: 120 g, Rfl: 3     furosemide (LASIX) 20 MG tablet, Take 20 mg by mouth as needed, Disp: , Rfl:      levothyroxine (SYNTHROID/LEVOTHROID) 137 MCG tablet, Take 137 mcg by mouth, Disp: , Rfl:      lisinopril (ZESTRIL) 20 MG tablet, Take 20 mg by mouth  Taking 1/2 tablet per day, Disp: , Rfl:      loratadine (CLARITIN) 10 MG tablet, Take 10 mg by mouth , Disp: , Rfl:      clindamycin (CLEOCIN) 300 MG capsule, Take 300 mg by mouth 4 times daily (Patient not taking: Reported on 2021), Disp: , Rfl:      Glucose Blood (BLOOD GLUCOSE TEST STRIPS) STRP, by In Vitro route daily, Disp: , Rfl:     Allergies  Allergies   Allergen Reactions     Amoxicillin GI Disturbance     N, V,  D     when     took   for   dental prophylaxis      Seasonal Allergies      Simvastatin Muscle Pain (Myalgia)       Social History  Social History     Socioeconomic History     Marital status:      Spouse name: Not on file     Number of children: Not on file     Years of education: Not on file     Highest education level: Not on file   Occupational History     Not on file   Tobacco Use     Smoking status: Former Smoker     Packs/day: 1.00     Years: 20.00     Pack years: 20.00     Types: Cigarettes     Start date: 1964     Quit date: 1984     Years since quittin.0     Smokeless tobacco: Never Used   Vaping Use     Vaping Use: Never used   Substance and Sexual Activity     Alcohol use: Yes     Comment: one beer or mixed drink a day in warmer weather     Drug use: Never     Sexual activity: Not Currently     Partners: Female     Birth control/protection: None   Other Topics Concern     Not on file   Social History Narrative     Not on file     Social Determinants of Health     Financial Resource Strain: Not on file   Food Insecurity: Not on file   Transportation Needs: Not on file   Physical Activity: Not on file   Stress: Not on file   Social Connections: Not on file   Intimate Partner Violence: Not on file   Housing Stability: Not on file       Family History  Family History   Problem Relation Age of Onset     Diabetes Sister        Review of Systems  As per HPI and PMHx, otherwise 10 system review including the head and neck, constitutional, eyes, respiratory, GI,  skin, neurologic, lymphatic, endocrine, and allergy systems is negative.    Physical Exam  BP (!) 141/70 (BP Location: Right arm, Patient Position: Chair, Cuff Size: Adult Large)   Pulse (!) 49   Temp 98.2  F (36.8  C) (Tympanic)   Wt 117 kg (258 lb)   BMI 35.98 kg/m    GENERAL: Patient is a pleasant, cooperative 76 year old male in no acute distress.  HEAD: Normocephalic, atraumatic.  Hair and scalp are normal.  EYES: Pupils are equal, round, reactive to light and accommodation.  Extraocular movements are intact.  The sclera nonicteric without injection.  The extraocular structures are normal.  EARS: Normal shape and symmetry.  No tenderness when palpating the mastoid or tragal areas bilaterally.    NOSE: Nares are patent.  Nasal mucosa is pink and moist.  Negative anterior rhinoscopy.  ORAL CAVITY: Dentition is in age-appropriate repair.  Mucous membranes are moist.  The right partial glossectomy site has healed well. The tongue is mobile, protrudes to the midline.  Palate elevates symmetrically.  No oral cavity or oropharyngeal masses, lesions, ulcerations, leukoplakia.  NEUROLOGIC: Cranial nerves II through XII are grossly intact.  Voice is strong.  Patient is House-Brackman I/VI bilaterally.  CARDIOVASCULAR: Extremities are warm and well-perfused.  No significant peripheral edema.  RESPIRATORY: Patient has nonlabored breathing without cough, wheeze, stridor.  PSYCHIATRIC: Patient is alert and oriented.  Mood and affect appear normal.  SKIN: Warm and dry.  No scalp, face, or neck lesions noted.    Assessment and Plan     ICD-10-CM    1. 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 Tongue lesion  K14.8    3. Status post partial glossectomy  Z98.890    4. Postoperative state  Z98.890       It was my pleasure seeing Anil BRAND Bhavik Breaux today in clinic.  The patient has healed well after hist right partial glossectomy.  I would recommend observation at this time.  The patient can return to clinic in 6 months  for follow up examination.  He can contact me as needed with problems or concerns.    Don to follow up with Primary Care provider regarding elevated blood pressure.    Rojelio Horton MD  Department of Otolaryngology-Head and Neck Surgery  [facility]       Again, thank you for allowing me to participate in the care of your patient.        Sincerely,        Rojelio Horton MD

## 2022-01-03 NOTE — NURSING NOTE
"Initial BP (!) 141/70 (BP Location: Right arm, Patient Position: Chair, Cuff Size: Adult Large)   Pulse (!) 49   Temp 98.2  F (36.8  C) (Tympanic)   Wt 117 kg (258 lb)   BMI 35.98 kg/m   Estimated body mass index is 35.98 kg/m  as calculated from the following:    Height as of 12/2/21: 1.803 m (5' 11\").    Weight as of this encounter: 117 kg (258 lb). .    Trinidad Gibbs LPN    "

## 2022-01-19 ASSESSMENT — ENCOUNTER SYMPTOMS
WEAKNESS: 0
DIARRHEA: 0
ABDOMINAL PAIN: 0
MYALGIAS: 0
ARTHRALGIAS: 1
PALPITATIONS: 0
NERVOUS/ANXIOUS: 0
CONSTIPATION: 0
DYSURIA: 0
HEMATURIA: 0
JOINT SWELLING: 1
SHORTNESS OF BREATH: 0
HEMATOCHEZIA: 0
NAUSEA: 0
EYE PAIN: 0
FEVER: 0
FREQUENCY: 1
SORE THROAT: 0
COUGH: 0
HEARTBURN: 0
HEADACHES: 0
PARESTHESIAS: 0
CHILLS: 0
DIZZINESS: 0

## 2022-01-19 ASSESSMENT — ACTIVITIES OF DAILY LIVING (ADL): CURRENT_FUNCTION: NO ASSISTANCE NEEDED

## 2022-01-20 ENCOUNTER — OFFICE VISIT (OUTPATIENT)
Dept: FAMILY MEDICINE | Facility: CLINIC | Age: 77
End: 2022-01-20
Payer: COMMERCIAL

## 2022-01-20 VITALS
TEMPERATURE: 96.8 F | DIASTOLIC BLOOD PRESSURE: 78 MMHG | WEIGHT: 261.6 LBS | OXYGEN SATURATION: 98 % | SYSTOLIC BLOOD PRESSURE: 126 MMHG | RESPIRATION RATE: 16 BRPM | BODY MASS INDEX: 37.45 KG/M2 | HEART RATE: 47 BPM | HEIGHT: 70 IN

## 2022-01-20 DIAGNOSIS — Z00.00 MEDICARE ANNUAL WELLNESS VISIT, SUBSEQUENT: Primary | ICD-10-CM

## 2022-01-20 DIAGNOSIS — E78.2 MIXED HYPERLIPIDEMIA: ICD-10-CM

## 2022-01-20 DIAGNOSIS — E11.9 CONTROLLED TYPE 2 DIABETES MELLITUS WITHOUT COMPLICATION, WITHOUT LONG-TERM CURRENT USE OF INSULIN (H): ICD-10-CM

## 2022-01-20 DIAGNOSIS — Z11.59 NEED FOR HEPATITIS C SCREENING TEST: ICD-10-CM

## 2022-01-20 DIAGNOSIS — Z12.11 SCREENING FOR MALIGNANT NEOPLASM OF COLON: ICD-10-CM

## 2022-01-20 DIAGNOSIS — Z13.220 LIPID SCREENING: ICD-10-CM

## 2022-01-20 DIAGNOSIS — I10 BENIGN ESSENTIAL HYPERTENSION: ICD-10-CM

## 2022-01-20 DIAGNOSIS — Z87.891 PERSONAL HISTORY OF TOBACCO USE, PRESENTING HAZARDS TO HEALTH: ICD-10-CM

## 2022-01-20 DIAGNOSIS — R00.1 SINUS BRADYCARDIA: ICD-10-CM

## 2022-01-20 DIAGNOSIS — H91.93 DECREASED HEARING OF BOTH EARS: ICD-10-CM

## 2022-01-20 DIAGNOSIS — Z23 NEED FOR VACCINATION: ICD-10-CM

## 2022-01-20 LAB
CHOLEST SERPL-MCNC: 207 MG/DL
CREAT UR-MCNC: 208 MG/DL
FASTING STATUS PATIENT QL REPORTED: YES
HCV AB SERPL QL IA: NONREACTIVE
HDLC SERPL-MCNC: 35 MG/DL
LDLC SERPL CALC-MCNC: 149 MG/DL
MICROALBUMIN UR-MCNC: 74 MG/L
MICROALBUMIN/CREAT UR: 35.58 MG/G CR (ref 0–17)
NONHDLC SERPL-MCNC: 172 MG/DL
TRIGL SERPL-MCNC: 116 MG/DL

## 2022-01-20 PROCEDURE — 36415 COLL VENOUS BLD VENIPUNCTURE: CPT | Performed by: FAMILY MEDICINE

## 2022-01-20 PROCEDURE — 99207 PR FOOT EXAM NO CHARGE: CPT | Performed by: FAMILY MEDICINE

## 2022-01-20 PROCEDURE — 82043 UR ALBUMIN QUANTITATIVE: CPT | Performed by: FAMILY MEDICINE

## 2022-01-20 PROCEDURE — 99214 OFFICE O/P EST MOD 30 MIN: CPT | Mod: 25 | Performed by: FAMILY MEDICINE

## 2022-01-20 PROCEDURE — 86803 HEPATITIS C AB TEST: CPT | Performed by: FAMILY MEDICINE

## 2022-01-20 PROCEDURE — 90714 TD VACC NO PRESV 7 YRS+ IM: CPT | Performed by: FAMILY MEDICINE

## 2022-01-20 PROCEDURE — 80061 LIPID PANEL: CPT | Performed by: FAMILY MEDICINE

## 2022-01-20 PROCEDURE — G0438 PPPS, INITIAL VISIT: HCPCS | Performed by: FAMILY MEDICINE

## 2022-01-20 PROCEDURE — 90471 IMMUNIZATION ADMIN: CPT | Performed by: FAMILY MEDICINE

## 2022-01-20 RX ORDER — ATENOLOL 25 MG/1
12.5 TABLET ORAL DAILY
Qty: 15 TABLET | Refills: 0 | Status: SHIPPED | OUTPATIENT
Start: 2022-01-20 | End: 2022-10-31

## 2022-01-20 RX ORDER — ATENOLOL 25 MG/1
12.5 TABLET ORAL DAILY
Qty: 15 TABLET | Refills: 0 | Status: SHIPPED | OUTPATIENT
Start: 2022-01-20 | End: 2022-01-20

## 2022-01-20 ASSESSMENT — ENCOUNTER SYMPTOMS
DIARRHEA: 0
FREQUENCY: 1
WEAKNESS: 0
COUGH: 0
FEVER: 0
NERVOUS/ANXIOUS: 0
PALPITATIONS: 0
HEMATURIA: 0
ABDOMINAL PAIN: 0
MYALGIAS: 0
JOINT SWELLING: 1
ARTHRALGIAS: 1
CHILLS: 0
DIZZINESS: 0
SORE THROAT: 0
PARESTHESIAS: 0
CONSTIPATION: 0
NAUSEA: 0
EYE PAIN: 0
HEARTBURN: 0
HEADACHES: 0
HEMATOCHEZIA: 0
DYSURIA: 0
SHORTNESS OF BREATH: 0

## 2022-01-20 ASSESSMENT — ACTIVITIES OF DAILY LIVING (ADL): CURRENT_FUNCTION: NO ASSISTANCE NEEDED

## 2022-01-20 ASSESSMENT — MIFFLIN-ST. JEOR: SCORE: 1923.89

## 2022-01-20 NOTE — PROGRESS NOTES
"SUBJECTIVE:   Anil Gutierres Jr. is a 76 year old male who presents for Preventive Visit.      Patient has been advised of split billing requirements and indicates understanding: Yes  Are you in the first 12 months of your Medicare coverage?  No    Healthy Habits:     In general, how would you rate your overall health?  Good    Frequency of exercise:  2-3 days/week    Duration of exercise:  15-30 minutes    Do you usually eat at least 4 servings of fruit and vegetables a day, include whole grains    & fiber and avoid regularly eating high fat or \"junk\" foods?  No    Taking medications regularly:  Yes    Medication side effects:  Other    Ability to successfully perform activities of daily living:  No assistance needed    Home Safety:  Lack of grab bars in the bathroom    Hearing Impairment:  Difficulty following a conversation in a noisy restaurant or crowded room, feel that people are mumbling or not speaking clearly, difficulty following dialogue in the theater, need to ask people to speak up or repeat themselves, find that men's voices are easier to understand than woman's and difficulty understanding soft or whispered speech    In the past 6 months, have you been bothered by leaking of urine?  No    In general, how would you rate your overall mental or emotional health?  Excellent      PHQ-2 Total Score: 0    Additional concerns today:  Yes    Bradycardia - patient was asking about this. Found about 6 months ago. No symptoms per patient. Last EKG was marked sinus mark with RBBB Sept 2021. No plan to see cardiology then. Atenolol was reduced to 25 mg daily,.    Do you feel safe in your environment? Yes    Have you ever done Advance Care Planning? (For example, a Health Directive, POLST, or a discussion with a medical provider or your loved ones about your wishes): No, advance care planning information given to patient to review.  Patient plans to discuss their wishes with loved ones or provider.      Patient " wants a hearing test.    Fall risk  Fallen 2 or more times in the past year?: No  Any fall with injury in the past year?: No    Cognitive Screening   1) Repeat 3 items (Leader, Season, Table)    2) Clock draw: NORMAL  3) 3 item recall: Recalls 3 objects  Results: 3 items recalled: COGNITIVE IMPAIRMENT LESS LIKELY    Mini-CogTM Copyright EZEKIEL Pandey. Licensed by the author for use in [facility]; reprinted with permission (soob@[email or web address]). All rights reserved.      Do you have sleep apnea, excessive snoring or daytime drowsiness?: no    Reviewed and updated as needed this visit by clinical staff  Tobacco  Allergies  Meds  Problems  Med Hx  Surg Hx  Fam Hx  Soc Hx       Reviewed and updated as needed this visit by Provider   Allergies  Meds  Problems           Social History     Tobacco Use     Smoking status: Former Smoker     Packs/day: 1.00     Years: 20.00     Pack years: 20.00     Types: Cigarettes     Start date: 1964     Quit date: 1984     Years since quittin.0     Smokeless tobacco: Never Used   Substance Use Topics     Alcohol use: Yes     Comment: one beer or mixed drink a day in warmer weather     If you drink alcohol do you typically have >3 drinks per day or >7 drinks per week? No    Alcohol Use 2022   Prescreen: >3 drinks/day or >7 drinks/week? -   Prescreen: >3 drinks/day or >7 drinks/week? No     Current providers sharing in care for this patient include:   Patient Care Team:  Jean-Pierre Rivera MD as PCP - General (Family Practice)  Rojelio Horton MD as Assigned Surgical Provider  Gilberto Alonzo MD as Assigned PCP    The following health maintenance items are reviewed in Epic and correct as of today:  Health Maintenance Due   Topic Date Due     LIPID  Never done     MICROALBUMIN  Never done     EYE EXAM  Never done     COLORECTAL CANCER SCREENING  Never done     HEPATITIS C SCREENING  Never done     FALL RISK ASSESSMENT  Never done     AORTIC ANEURYSM  SCREENING (SYSTEM ASSIGNED)  Never done     ZOSTER IMMUNIZATION (2 of 3) 2016     There is no problem list on file for this patient.    Past Surgical History:   Procedure Laterality Date     BIOPSY      Tongue     COLONOSCOPY  2017     EYE SURGERY  2019    cataracts     GLOSSECTOMY PARTIAL Right 2021    Procedure: GLOSSECTOMY, PARTIAL;  Surgeon: Rojelio Horton MD;  Location: [facility] OR     ORTHOPEDIC SURGERY  dec  23, 2020    R Hip       Social History     Tobacco Use     Smoking status: Former Smoker     Packs/day: 1.00     Years: 20.00     Pack years: 20.00     Types: Cigarettes     Start date: 1964     Quit date: 1984     Years since quittin.0     Smokeless tobacco: Never Used   Substance Use Topics     Alcohol use: Yes     Comment: one beer or mixed drink a day in warmer weather     Family History   Problem Relation Age of Onset     Arthritis Mother      Cerebrovascular Disease Father      Diabetes Sister          Current Outpatient Medications   Medication Sig Dispense Refill     aspirin 81 MG EC tablet Take 81 mg by mouth daily       atenolol (TENORMIN) 25 MG tablet Take 0.5 tablets (12.5 mg) by mouth daily 15 tablet 0     fluocinonide (LIDEX) 0.05 % external cream Apply topically 2 times daily 120 g 3     furosemide (LASIX) 20 MG tablet Take 20 mg by mouth as needed       levothyroxine (SYNTHROID/LEVOTHROID) 137 MCG tablet Take 137 mcg by mouth       lisinopril (ZESTRIL) 20 MG tablet Take 20 mg by mouth Taking 1/2 tablet per day       clindamycin (CLEOCIN) 300 MG capsule Take 300 mg by mouth 4 times daily (Patient not taking: Reported on 2021)       Glucose Blood (BLOOD GLUCOSE TEST STRIPS) STRP by In Vitro route daily       loratadine (CLARITIN) 10 MG tablet Take 10 mg by mouth  (Patient not taking: Reported on 2022)       Allergies   Allergen Reactions     Amoxicillin GI Disturbance     N, V,  D     when     took   for   dental prophylaxis      Seasonal Allergies       "Simvastatin Muscle Pain (Myalgia)     Pneumonia Vaccine: UTD        Review of Systems   Constitutional: Negative for chills and fever.   HENT: Positive for hearing loss. Negative for congestion, ear pain and sore throat.    Eyes: Negative for pain and visual disturbance.   Respiratory: Negative for cough and shortness of breath.    Cardiovascular: Positive for peripheral edema. Negative for chest pain and palpitations.   Gastrointestinal: Negative for abdominal pain, constipation, diarrhea, heartburn, hematochezia and nausea.   Genitourinary: Positive for frequency and impotence. Negative for dysuria, genital sores, hematuria, penile discharge and urgency.   Musculoskeletal: Positive for arthralgias and joint swelling. Negative for myalgias.   Skin: Negative for rash.   Neurological: Negative for dizziness, weakness, headaches and paresthesias.   Psychiatric/Behavioral: Negative for mood changes. The patient is not nervous/anxious.        OBJECTIVE:   /78   Pulse (!) 47   Temp 96.8  F (36  C) (Tympanic)   Resp 16   Ht 1.772 m (5' 9.75\")   Wt 118.7 kg (261 lb 9.6 oz)   SpO2 98%   BMI 37.81 kg/m   Estimated body mass index is 37.81 kg/m  as calculated from the following:    Height as of this encounter: 1.772 m (5' 9.75\").    Weight as of this encounter: 118.7 kg (261 lb 9.6 oz).  Physical Exam  GENERAL APPEARANCE: morbidly obese, alert and no distress  EYES: pink conj, no icterus, PERRL, EOMI  HENT: ear canals and TM's normal, nose and mouth without ulcers or lesions, oropharynx clear and oral mucous membranes moist  NECK: no adenopathy, no asymmetry, masses, or scars and thyroid normal to palpation  RESP: lungs clear to auscultation - no rales, rhonchi or wheezes  CV: bradycardic, regular  rhythm, normal S1 S2, no S3 or S4, no murmur, click or rub,  and peripheral pulses strong  ABDOMEN: protuberant, soft, nontender, no hepatosplenomegaly, no masses and bowel sounds normal  RECTAL: deferred  MS: no " musculoskeletal defects are noted and gait is age appropriate without ataxia; bilateral leg and foot pitting edema with no ulcer or vesicles or skin erythema  SKIN: no suspicious lesions or rashes  NEURO: Normal strength and tone, sensory exam grossly normal, mentation intact and speech normal; normoreflexic x 4    Diagnostic Test Results:  none     ASSESSMENT / PLAN:   Anil was seen today for wellness visit.    Diagnoses and all orders for this visit:    Medicare annual wellness visit, subsequent  Patient was advised on recommended screening and preventive health recommendations.  He verbalized understanding and agreed to the plans below.    Sinus bradycardia  -     OFFICE/OUTPT VISIT,EST,LEVL III  Asymptomatic. Chronic.  As per previous PCP, more likely side effect of atenolol.  Discussed reduction of atenolol dose further. Patient concurs.  Advised to monitor HR at home -patient states his wrist BP monitor measures pulse rate.  Return precautions discussed and given to patient.   Recheck in 2-3 weeks to determine if beta blocker should be discontinued to prevent future symptomatic bradycardia.    Benign essential hypertension  -     Discontinue: atenolol (TENORMIN) 25 MG tablet; Take 0.5 tablets (12.5 mg) by mouth daily  -     atenolol (TENORMIN) 25 MG tablet; Take 0.5 tablets (12.5 mg) by mouth daily  -     OFFICE/OUTPT VISIT,EST,LEVL III  Controlled.  However, adjusting beta blocker dose due to bradycadia.  All other meds to remain same.  Low salt, low fat diet.   Exercise as tolerated.  Take meds as prescribed; call if with side effects.     Decreased hearing of both ears  -     Adult Audiology Referral; Future    Need for vaccination  -     TD PRSERV FREE >=7 YRS ADS IM [[id number]]    Need for hepatitis C screening test  -     Hepatitis C Screen Reflex to HCV RNA Quant and Genotype; Future    Screening for malignant neoplasm of colon  -     Adult Gastro Ref - Procedure Only; Future    Mixed hyperlipidemia  -   "   Lipid panel reflex to direct LDL Fasting; Future  Reinforced heart healthy lifestyle.    Controlled type 2 diabetes mellitus without complication, without long-term current use of insulin (H)  -     Albumin Random Urine Quantitative with Creat Ratio; Future  -     Lipid panel reflex to direct LDL Fasting; Future  -     FOOT EXAM  Reminded to complete eye exam soon.    Personal history of tobacco use, presenting hazards to health  -     US Aorta Medicare AAA Screening; Future    Other orders  -     REVIEW OF HEALTH MAINTENANCE PROTOCOL ORDERS        Patient has been advised of split billing requirements and indicates understanding: Yes    COUNSELING:  Reviewed preventive health counseling, as reflected in patient instructions    Estimated body mass index is 37.81 kg/m  as calculated from the following:    Height as of this encounter: 1.772 m (5' 9.75\").    Weight as of this encounter: 118.7 kg (261 lb 9.6 oz).    Weight management plan: Discussed healthy diet and exercise guidelines    He reports that he quit smoking about 38 years ago. His smoking use included cigarettes. He started smoking about 58 years ago. He has a 20.00 pack-year smoking history. He has never used smokeless tobacco.      Appropriate preventive services were discussed with this patient, including applicable screening as appropriate for cardiovascular disease, diabetes, osteopenia/osteoporosis, and glaucoma.  As appropriate for age/gender, discussed screening for colorectal cancer, prostate cancer, breast cancer, and cervical cancer. Checklist reviewing preventive services available has been given to the patient.    Reviewed patients plan of care and provided an AVS. The Basic Care Plan (routine screening as documented in Health Maintenance) and Complex Care Plan (for patients with higher acuity and needing more deliberate coordination of services) for Anil meets the Care Plan requirement. This Care Plan has been established and reviewed " with the Patient.    Counseling Resources:  ATP IV Guidelines  Pooled Cohorts Equation Calculator  Breast Cancer Risk Calculator  Breast Cancer: Medication to Reduce Risk  FRAX Risk Assessment  ICSI Preventive Guidelines  Dietary Guidelines for Americans, 2010  USDA's MyPlate  ASA Prophylaxis  Lung CA Screening    Gilberto Alonzo MD  [facility]    Identified Health Risks:

## 2022-01-20 NOTE — PATIENT INSTRUCTIONS
Reduce atenolol to 12.5 mg daily.  Monitor pulse rate at home for improvement.  Normal heart rate is between . If still on atenolol, heart rate can be in the 50's and if with no symptoms can continue atenolol.  Continue all other medications as is.  Remember to observe low salt diet.    Schedule follow up in clinic in 2-3 weeks for blood pressure and pulse recheck and to determine if atenolol should be continued.    Be consistent with low trans fat and saturated fat diet.  Eat food rich in omega-3-fatty acids as you tolerate. (salmon, olive oil)  Eat 5 cups of vegetables, fruits and whole grains per day.  Limit starchy food (white rice, white bread, white pasta, white potatoes) to less than a cup per meal.  Minimize sweets, junk food and fastfood. Limit soda beverages to one serving per day; best to avoid it altogether though.  Exercise: moderate intensity sustained for at least 30 mins per episode, goal of 150 mins per week at least  Combine cardiovascular and resistance exercises.  These exercise recommendations are in addition to your daily activity at work or home.  Work on losing weight.    To schedule the ultrasound to screen for abdominal aortic aneurysm, call [phone].    Schedule colonoscopy for screening for colon cancer at your soonest convenience. The  will call you in 3-7 business days. If no call by end of next week, call the referral number in this visitt summary.    Schedule audiology appointment.    Preventative Care Visits include: Yearly physicals, Well-child visits, Welcome to Medicare visits, & Medicare yearly wellness exams.    The purpose of these visits is to discuss your medical history and prevent health problems before you are sick.  You may need to pay a copay, coinsurance or deductible if your visit today includes testing or treating for a new or existing condition.    Additional charges may be incurred for today's visit. If you have questions about what your insurance  plan covers, please contact your Insurance Company's member service department.  If you have questions specific to a bill you have already received from [employer], please contact the [facility] Billing Office at [phone].     Patient Education   Personalized Prevention Plan  You are due for the preventive services outlined below.  Your care team is available to assist you in scheduling these services.  If you have already completed any of these items, please share that information with your care team to update in your medical record.  Health Maintenance Due   Topic Date Due     Cholesterol Lab  Never done     Kidney Microalbumin Urine Test  Never done     Diabetic Foot Exam  Never done     ANNUAL REVIEW OF HM ORDERS  Never done     Discuss Advance Care Planning  Never done     Eye Exam  Never done     Colorectal Cancer Screening  Never done     Hepatitis C Screening  Never done     FALL RISK ASSESSMENT  Never done     AORTIC ANEURYSM SCREENING (SYSTEM ASSIGNED)  Never done     Zoster (Shingles) Vaccine (2 of 3) 05/25/2016     Preventive Health Recommendations  See your health care provider every year to    Review health changes.     Discuss preventive care.      Review your medicines if your doctor has prescribed any.    Talk with your health care provider about whether you should have a test to screen for prostate cancer (PSA).    Every 3 years, have a diabetes test (fasting glucose). If you are at risk for diabetes, you should have this test more often.    Every 5 years, have a cholesterol test. Have this test more often if you are at risk for high cholesterol or heart disease.     Every 10 years, have a colonoscopy. Or, have a yearly FIT test (stool test). These exams will check for colon cancer.    Talk to with your health care provider about screening for Abdominal Aortic Aneurysm if you have a family history of AAA or have a history of smoking.    Shots:     Get a flu shot each year.     Get a tetanus shot  every 10 years.     Talk to your doctor about your pneumonia vaccines. There are now two you should receive - Pneumovax (PPSV 23) and Prevnar (PCV 13).    Talk to your pharmacist about a shingles vaccine.     Talk to your doctor about the hepatitis B vaccine.    Nutrition:     Eat at least 5 servings of fruits and vegetables each day.     Eat whole-grain bread, whole-wheat pasta and brown rice instead of white grains and rice.     Get adequate Calcium and Vitamin D.     Lifestyle    Exercise for at least 150 minutes a week (30 minutes a day, 5 days a week). This will help you control your weight and prevent disease.     Limit alcohol to one drink per day.     No smoking.     Wear sunscreen to prevent skin cancer.     See your dentist every six months for an exam and cleaning.     See your eye doctor every 1 to 2 years to screen for conditions such as glaucoma, macular degeneration and cataracts.    Personalized Prevention Plan  You are due for the preventive services outlined below.  Your care team is available to assist you in scheduling these services.  If you have already completed any of these items, please share that information with your care team to update in your medical record.  Health Maintenance   Topic Date Due     LIPID  Never done     MICROALBUMIN  Never done     DIABETIC FOOT EXAM  Never done     ANNUAL REVIEW OF HM ORDERS  Never done     ADVANCE CARE PLANNING  Never done     EYE EXAM  Never done     COLORECTAL CANCER SCREENING  Never done     HEPATITIS C SCREENING  Never done     FALL RISK ASSESSMENT  Never done     AORTIC ANEURYSM SCREENING (SYSTEM ASSIGNED)  Never done     ZOSTER IMMUNIZATION (2 of 3) 05/25/2016     A1C  02/28/2022     BMP  11/29/2022     MEDICARE ANNUAL WELLNESS VISIT  01/20/2023     DTAP/TDAP/TD IMMUNIZATION (3 - Td or Tdap) 01/20/2032     PHQ-2  Completed     INFLUENZA VACCINE  Completed     Pneumococcal Vaccine: 65+ Years  Completed     COVID-19 Vaccine  Completed     IPV  IMMUNIZATION  Aged Out     MENINGITIS IMMUNIZATION  Aged Out       Understanding USDA MyPlate  The USDA has guidelines to help you make healthy food choices. These are called MyPlate. MyPlate shows the food groups that make up healthy meals using the image of a place setting. Before you eat, think about the healthiest choices for what to put on your plate or in your cup or bowl. To learn more about building a healthy plate, visit www.choosemyplate.gov.    The food groups    Fruits. Any fruit or 100% fruit juice counts as part of the Fruit Group. Fruits may be fresh, canned, frozen, or dried, and may be whole, cut-up, or pureed. Make 1/2 of your plate fruits and vegetables.    Vegetables. Any vegetable or 100% vegetable juice counts as a member of the Vegetable Group. Vegetables may be fresh, frozen, canned, or dried. They can be served raw or cooked and may be whole, cut-up, or mashed. Make 1/2 of your plate fruits and vegetables.    Grains. All foods made from grains are part of the Grains Group. These include wheat, rice, oats, cornmeal, and barley. Grains are often used to make foods such as bread, pasta, oatmeal, cereal, tortillas, and grits. Grains should be no more than 1/4 of your plate. At least half of your grains should be whole grains.    Protein. This group includes meat, poultry, seafood, beans and peas, eggs, processed soy products (such as tofu), nuts (including nut butters), and seeds. Make protein choices no more than 1/4 of your plate. Meat and poultry choices should be lean or low fat.    Dairy. The Dairy Group includes all fluid milk products and foods made from milk that contain calcium, such as yogurt and cheese. (Foods that have little calcium, such as cream, butter, and cream cheese, are not part of this group.) Most dairy choices should be low-fat or fat-free.    Oils. Oils aren't a food group, but they do contain essential nutrients. However it's important to watch your intake of oils.  These are fats that are liquid at room temperature. They include canola, corn, olive, soybean, vegetable, and sunflower oil. Foods that are mainly oil include mayonnaise, certain salad dressings, and soft margarines. You likely already get your daily oil allowance from the foods you eat.  Things to limit  Eating healthy also means limiting these things in your diet:       Salt (sodium). Many processed foods have a lot of sodium. To keep sodium intake down, eat fresh vegetables, meats, poultry, and seafood when possible. Purchase low-sodium, reduced-sodium, or no-salt-added food products at the store. And don't add salt to your meals at home. Instead, season them with herbs and spices such as dill, oregano, cumin, and paprika. Or try adding flavor with lemon or lime zest and juice.    Saturated fat. Saturated fats are most often found in animal products such as beef, pork, and chicken. They are often solid at room temperature, such as butter. To reduce your saturated fat intake, choose leaner cuts of meat and poultry. And try healthier cooking methods such as grilling, broiling, roasting, or baking. For a simple lower-fat swap, use plain nonfat yogurt instead of mayonnaise when making potato salad or macaroni salad.    Added sugars. These are sugars added to foods. They are in foods such as ice cream, candy, soda, fruit drinks, sports drinks, energy drinks, cookies, pastries, jams, and syrups. Cut down on added sugars by sharing sweet treats with a family member or friend. You can also choose fruit for dessert, and drink water or other unsweetened beverages.     [employer] last reviewed this educational content on 6/1/2020 2000-2021 [employer]. All rights reserved. This information is not intended as a substitute for professional medical care. Always follow your healthcare professional's instructions.          Signs of Hearing Loss      Hearing much better with one ear can be a sign of hearing loss.    Hearing loss is a problem shared by many people. In fact, it is one of the most common health problems, particularly as people age. Most people age 65 and older have some hearing loss. By age 80, almost everyone does. Hearing loss often occurs slowly over the years. So you may not realize your hearing has gotten worse.  Have your hearing checked  Call your healthcare provider if you:    Have to strain to hear normal conversation    Have to watch other people s faces very carefully to follow what they re saying    Need to ask people to repeat what they ve said    Often misunderstand what people are saying    Turn the volume of the television or radio up so high that others complain    Feel that people are mumbling when they re talking to you    Find that the effort to hear leaves you feeling tired and irritated    Notice, when using the phone, that you hear better with one ear than the other  [employer] last reviewed this educational content on 1/1/2020 2000-2021 [employer]. All rights reserved. This information is not intended as a substitute for professional medical care. Always follow your healthcare professional's instructions.

## 2022-01-21 NOTE — RESULT ENCOUNTER NOTE
The 10-year ASCVD risk score (Barbara GUZMAN Jr., et al., 2013) is: 51.3%    Values used to calculate the score:      Age: 76 years      Sex: Male      Is Non- : No      Diabetic: Yes      Tobacco smoker: No      Systolic Blood Pressure: 126 mmHg      Is BP treated: Yes      HDL Cholesterol: 35 mg/dL      Total Cholesterol: 207 mg/dL

## 2022-01-31 ENCOUNTER — HOSPITAL ENCOUNTER (OUTPATIENT)
Dept: ULTRASOUND IMAGING | Facility: CLINIC | Age: 77
Discharge: HOME | End: 2022-01-31
Attending: FAMILY MEDICINE | Admitting: FAMILY MEDICINE
Payer: COMMERCIAL

## 2022-01-31 DIAGNOSIS — Z87.891 PERSONAL HISTORY OF TOBACCO USE, PRESENTING HAZARDS TO HEALTH: ICD-10-CM

## 2022-01-31 PROCEDURE — 76706 US ABDL AORTA SCREEN AAA: CPT

## 2022-02-03 ENCOUNTER — OFFICE VISIT (OUTPATIENT)
Dept: AUDIOLOGY | Facility: CLINIC | Age: 77
End: 2022-02-03
Attending: FAMILY MEDICINE
Payer: COMMERCIAL

## 2022-02-03 DIAGNOSIS — H91.93 DECREASED HEARING OF BOTH EARS: ICD-10-CM

## 2022-02-03 DIAGNOSIS — H90.3 SENSORINEURAL HEARING LOSS, ASYMMETRICAL: Primary | ICD-10-CM

## 2022-02-03 DIAGNOSIS — H93.13 TINNITUS, BILATERAL: ICD-10-CM

## 2022-02-03 PROCEDURE — 99207 PR NO CHARGE LOS: CPT | Performed by: AUDIOLOGIST

## 2022-02-03 PROCEDURE — 92550 TYMPANOMETRY & REFLEX THRESH: CPT | Performed by: AUDIOLOGIST

## 2022-02-03 PROCEDURE — 92557 COMPREHENSIVE HEARING TEST: CPT | Performed by: AUDIOLOGIST

## 2022-02-03 NOTE — PROGRESS NOTES
AUDIOLOGY REPORT    SUBJECTIVE:  Anil Gutierres Jr. is a 76 year old male who was seen in the Audiology Clinic [facility] on 2/03/22 for audiologic evaluation, referred by Gilberto Alonzo MD.  The patient reports a longstanding hearing loss and tinnitus bilaterally. 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 The patient denies  bilateral otalgia, bilateral drainage, bilateral aural fullness, family history of hearing loss, and dizziness. The patient notes difficulty with communication in a variety of listening situations. Patient was unaccompanied to today's visit.     Abuse Screening:  Do you feel unsafe at home or work/school? No  Do you feel threatened by someone? No  Does anyone try to keep you from having contact with others, or doing things outside of your home? No  Physical signs of abuse present? No     OBJECTIVE:    Otoscopic exam indicates ears are clear of cerumen bilaterally     Pure Tone Thresholds assessed using standard techniques  audiometry with good  reliability from 250-8000 Hz bilaterally using insert earphones and circumaural headphones     RIGHT:  normal and borderline-normal hearing sensitivity through 750 Hz then a mild to moderate  sensorineural hearing loss    LEFT:    normal and borderline-normal hearing sensitivity through 750 Hz then a mild to moderate sensorineural hearing loss    NOTE: Change in transducers did not merit a change in thresholds.     Tympanogram:    RIGHT: normal eardrum mobility    LEFT:   normal eardrum mobility   NOTE: Wide tympanometric width bilaterally     Reflexes (reported by stimulus ear): 1000 Hz  RIGHT: Ipsilateral is absent at frequencies tested  RIGHT: Contralateral is absent at frequencies tested  LEFT:   Ipsilateral is absent at frequencies tested  LEFT:   Contralateral is absent at frequencies tested    Speech Reception Threshold:    RIGHT: 40 dB HL    LEFT:    30 dB HL    Word Recognition Score:     RIGHT: 64% at 80 dB HL using NU-6 recorded word list.    LEFT:   100% at 80 dB HL using NU-6 recorded word list.    ASSESSMENT:   Bilateral asymmetric sensorineural hearing loss and bilateral tinnitus     Today s results were discussed with the patient in detail. Patient requested and was provided a copy of his audiogram.     PLAN:  Patient was counseled regarding hearing loss and impact on communication.  Patient is a good candidate for amplification at this time. It is recommended that the patient see ENT for the asymmetric hearing loss. [facility]. Please call this clinic with questions regarding these results or recommendations.    Jan Fuast CCC-A  Licensed Audiologist #[id number]  2/3/2022    CC: Dr. Alonzo

## 2022-03-26 ENCOUNTER — HEALTH MAINTENANCE LETTER (OUTPATIENT)
Age: 77
End: 2022-03-26

## 2022-03-31 ENCOUNTER — TRANSFERRED RECORDS (OUTPATIENT)
Dept: HEALTH INFORMATION MANAGEMENT | Facility: CLINIC | Age: 77
End: 2022-03-31
Payer: COMMERCIAL

## 2022-03-31 LAB — RETINOPATHY: POSITIVE

## 2022-04-18 ENCOUNTER — OFFICE VISIT (OUTPATIENT)
Dept: FAMILY MEDICINE | Facility: CLINIC | Age: 77
End: 2022-04-18
Payer: COMMERCIAL

## 2022-04-18 VITALS
BODY MASS INDEX: 36.11 KG/M2 | HEIGHT: 70 IN | SYSTOLIC BLOOD PRESSURE: 120 MMHG | DIASTOLIC BLOOD PRESSURE: 68 MMHG | RESPIRATION RATE: 16 BRPM | OXYGEN SATURATION: 98 % | HEART RATE: 54 BPM | TEMPERATURE: 96.6 F | WEIGHT: 252.2 LBS

## 2022-04-18 DIAGNOSIS — I10 BENIGN ESSENTIAL HYPERTENSION: ICD-10-CM

## 2022-04-18 DIAGNOSIS — N18.31 STAGE 3A CHRONIC KIDNEY DISEASE (H): ICD-10-CM

## 2022-04-18 DIAGNOSIS — R93.1 ABNORMAL NUCLEAR CARDIAC IMAGING TEST: ICD-10-CM

## 2022-04-18 DIAGNOSIS — Z01.810 ENCOUNTER FOR PREPROCEDURAL CARDIOVASCULAR EXAMINATION: ICD-10-CM

## 2022-04-18 DIAGNOSIS — Z01.818 PREOP GENERAL PHYSICAL EXAM: Primary | ICD-10-CM

## 2022-04-18 DIAGNOSIS — R00.1 SINUS BRADYCARDIA: ICD-10-CM

## 2022-04-18 DIAGNOSIS — E78.2 MIXED HYPERLIPIDEMIA: ICD-10-CM

## 2022-04-18 DIAGNOSIS — I45.10 RBBB: ICD-10-CM

## 2022-04-18 DIAGNOSIS — E11.9 CONTROLLED TYPE 2 DIABETES MELLITUS WITHOUT COMPLICATION, WITHOUT LONG-TERM CURRENT USE OF INSULIN (H): ICD-10-CM

## 2022-04-18 DIAGNOSIS — M17.12 PRIMARY OSTEOARTHRITIS OF LEFT KNEE: ICD-10-CM

## 2022-04-18 LAB
ANION GAP SERPL CALCULATED.3IONS-SCNC: 4 MMOL/L (ref 3–14)
BUN SERPL-MCNC: 26 MG/DL (ref 7–30)
CALCIUM SERPL-MCNC: 9.5 MG/DL (ref 8.5–10.1)
CHLORIDE BLD-SCNC: 103 MMOL/L (ref 94–109)
CO2 SERPL-SCNC: 28 MMOL/L (ref 20–32)
CREAT SERPL-MCNC: 1.31 MG/DL (ref 0.66–1.25)
GFR SERPL CREATININE-BSD FRML MDRD: 57 ML/MIN/1.73M2
GLUCOSE BLD-MCNC: 122 MG/DL (ref 70–99)
HBA1C MFR BLD: 6.2 % (ref 0–5.6)
POTASSIUM BLD-SCNC: 4.6 MMOL/L (ref 3.4–5.3)
SODIUM SERPL-SCNC: 135 MMOL/L (ref 133–144)

## 2022-04-18 PROCEDURE — 99215 OFFICE O/P EST HI 40 MIN: CPT | Performed by: FAMILY MEDICINE

## 2022-04-18 PROCEDURE — 93000 ELECTROCARDIOGRAM COMPLETE: CPT | Performed by: FAMILY MEDICINE

## 2022-04-18 PROCEDURE — 80048 BASIC METABOLIC PNL TOTAL CA: CPT | Performed by: FAMILY MEDICINE

## 2022-04-18 PROCEDURE — 83036 HEMOGLOBIN GLYCOSYLATED A1C: CPT | Performed by: FAMILY MEDICINE

## 2022-04-18 PROCEDURE — 36415 COLL VENOUS BLD VENIPUNCTURE: CPT | Performed by: FAMILY MEDICINE

## 2022-04-18 ASSESSMENT — PAIN SCALES - GENERAL: PAINLEVEL: NO PAIN (0)

## 2022-04-18 NOTE — PATIENT INSTRUCTIONS
You may take lisinopril and atenolol  on morning of surgery if they are scheduled to be taken then. Take only with a small sip of water.  All other scheduled medication may be held on morning of surgery, and resumed when you are allowed to eat.      Hold aspirin 7 days before surgery.    Do not take Ibuprofen, or Naproxen from now until after procedure.  If you need to take something for pain, take Acetaminophen 325 mg orally 1-2 tabs every 4-6 hrs as needed for pain     You will be contacted in 1-2 days for results of your lab tests.     Due to an abnormal EKG, and presence of multiple conditions that can increase your cardiovascular risks, a lexiscan stress test is needed to stratify your risk for developing acute heart events perioperatively.  Contact Cardiac Services  at [phone], to schedule Lexiscan stress teset appointment.   Your surgeon will be notified of this plan.  If the lexiscan is abnormal, a formal cardiology consult will be needed prior to surgery.      Preparing for Your Surgery  Getting started  A nurse will call you to review your health history and instructions. They will give you an arrival time based on your scheduled surgery time. Please be ready to share:  Your doctor's clinic name and phone number  Your medical, surgical and anesthesia history  A list of allergies and sensitivities  A list of medicines, including herbal treatments and over-the-counter drugs  Whether the patient has a legal guardian (ask how to send us the papers in advance)  Please tell us if you're pregnant--or if there's any chance you might be pregnant. Some surgeries may injure a fetus (unborn baby), so they require a pregnancy test. Surgeries that are safe for a fetus don't always need a test, and you can choose whether to have one.   If you have a child who's having surgery, please ask for a copy of Preparing for Your Child's Surgery.    Preparing for surgery  Within 30 days of surgery: Have a pre-op exam  (sometimes called an H&P, or History and Physical). This can be done at a clinic or pre-operative center.  If you're having a , you may not need this exam. Talk to your care team.  At your pre-op exam, talk to your care team about all medicines you take. If you need to stop any medicines before surgery, ask when to start taking them again.  We do this for your safety. Many medicines can make you bleed too much during surgery. Some change how well surgery (anesthesia) drugs work.  Call your insurance company to let them know you're having surgery. (If you don't have insurance, call [phone].)  Call your clinic if there's any change in your health. This includes signs of a cold or flu (sore throat, runny nose, cough, rash, fever). It also includes a scrape or scratch near the surgery site.  If you have questions on the day of surgery, call your hospital or surgery center.  COVID testing  You may need to be tested for COVID-19 before having surgery. If so, your surgical team will give you instructions for scheduling this test, separate from your preoperative history and physical.  Eating and drinking guidelines  For your safety: Unless your surgeon tells you otherwise, follow the guidelines below.  Eat and drink as usual until 8 hours before surgery. After that, no food or milk.  Drink clear liquids until 2 hours before surgery. These are liquids you can see through, like water, Gatorade and Propel Water. You may also have black coffee and tea (no cream or milk).  Nothing by mouth within 2 hours of surgery. This includes gum, candy and breath mints.  If you drink alcohol: Stop drinking it the night before surgery.  If your care team tells you to take medicine on the morning of surgery, it's okay to take it with a sip of water.  Preventing infection  Shower or bathe the night before and morning of your surgery. Follow the instructions your clinic gave you. (If no instructions, use regular soap.)  Don't  shave or clip hair near your surgery site. We'll remove the hair if needed.  Don't smoke or vape the morning of surgery. You may chew nicotine gum up to 2 hours before surgery. A nicotine patch is okay.  Note: Some surgeries require you to completely quit smoking and nicotine. Check with your surgeon.  Your care team will make every effort to keep you safe from infection. We will:  Clean our hands often with soap and water (or an alcohol-based hand rub).  Clean the skin at your surgery site with a special soap that kills germs.  Give you a special gown to keep you warm. (Cold raises the risk of infection.)  Wear special hair covers, masks, gowns and gloves during surgery.  Give antibiotic medicine, if prescribed. Not all surgeries need antibiotics.  What to bring on the day of surgery  Photo ID and insurance card  Copy of your health care directive, if you have one  Glasses and hearing aides (bring cases)  You can't wear contacts during surgery  Inhaler and eye drops, if you use them (tell us about these when you arrive)  CPAP machine or breathing device, if you use them  A few personal items, if spending the night  If you have . . .  A pacemaker, ICD (cardiac defibrillator) or other implant: Bring the ID card.  An implanted stimulator: Bring the remote control.  A legal guardian: Bring a copy of the certified (court-stamped) guardianship papers.  Please remove any jewelry, including body piercings. Leave jewelry and other valuables at home.  If you're going home the day of surgery  You must have a responsible adult drive you home. They should stay with you overnight as well.  If you don't have someone to stay with you, and you aren't safe to go home alone, we may keep you overnight. Insurance often won't pay for this.  After surgery  If it's hard to control your pain or you need more pain medicine, please call your surgeon's office.  Questions?   If you have any questions for your care team, list them here:  "_________________________________________________________________________________________________________________________________________________________________________ ____________________________________ ____________________________________ ____________________________________  For informational purposes only. Not to replace the advice of your health care provider. Copyright   2003, 2019 [facility]. All rights reserved. Clinically reviewed by Halima Celestin MD. SMARTworks 974620 - REV 07/21.    Before Your Procedure or Hospital Admission  Testing for COVID-19 (Coronavirus)  Thank you for choosing [facility] for your health care needs. The COVID-19 pandemic is a very challenging time for everyone.   Our goal is to keep you and our team here at [facility] safe and healthy. We've taken many steps to make this happen. For example:  We test and screen our staff, care teams and patients for COVID-19.  Everyone at [facility] must wear a mask and stay 6 feet apart.  We are limiting hospital and clinic visitors.  Before you come in  If you test COVID-19 positive with any kind of test before your surgery date, call your surgeon's office. We need to know the date of your positive test. We may need to re-schedule your surgery.  Unless you've had a positive COVID-19 test within the past 90 days, you must get tested for COVID-19 even if you've been vaccinated. Your test needs to happen 2 to 4 days before you check in to the hospital or surgery site.  A clinic scheduler will call you about a week in advance to set up a testing time at one of our labs.  Note: If you have a test anywhere but [facility], be sure you get an RT-PCR or an NAAT (Nucleic Acid Amplification Test) test.  Do NOT get a \"rapid antigen\" test. Negative results from \"rapid antigen\" tests are NOT accepted before your surgery.  After the test, please stay at home and out of contact with other people. This will help prevent " possible COVID-19 exposure before your treatment. Please follow all current safety guidelines, including:  Limit trips outside your home.  Limit the number of people you see.  Always wear a mask outside your home.  Use social distancing. Stay 6 feet away from others whenever you can.  Wash your hands often.  If your test shows you have COVID-19  If your test is positive, we'll let you know. A positive test means that you have the virus.   We'll probably have to postpone your admission, surgery or procedure. Your doctor will discuss this with you. After that, we'll let you know what to do and when you can re-schedule.   We may need to cancel your treatment on short notice for other reasons, too.  If your test shows you DON'T have COVID-19  Even if your test is negative, you can still get COVID-19. It's rare but, sometimes, the test result is wrong. You could also catch the virus after taking the test.   There's a very small chance that you could catch COVID-19 in the hospital or surgery center. [facility] has taken many steps to prevent this from happening.   Day of your surgery or procedure  Please come wearing a face covering that covers both your nose and mouth.  When you arrive, we'll ask you some questions to find out if you've had any exposures to COVID-19, or have any signs of COVID-19.  Ask your care team if you can have visitors. All visitors must wear face coverings and will be screened for exposure to, or signs of, COVID-19.  Even if no visitors are allowed, you can still have with you:  Your legal guardian or legal decision maker  Someone to help you, if you are disabled  A parent and one other visitor, if you are younger than 18 years old  A partner and a , if you are in labor  We might need to teach you about taking care of yourself after surgery. If so, a visitor can come into the hospital to learn about it, too.  The rules for visitors change often, depending on how much the virus is  "spreading. To learn more, see Visiting a Loved One in the Hospital during the COVID-19 Outbreak.  Please call your care team, hospital or surgery center if you have any questions. We thank you for your understanding and for choosing [facility] for your care.   Possible surgery delay  Like you, we want your surgery to happen when it's scheduled. But sometimes the hospital is so full that it's not safe for you to have your surgery. This is especially true during the pandemic. Your surgery may need to be re-scheduled at a later date. If this happens, we will call and tell you.  Questions and answers  Does it matter where I get tested for COVID-19?  Yes. We urge you to get tested at one of our [facility] COVID-19 testing sites. These tests will be either RT-PCR or NAAT, and are accepted. We process these tests in our lab and can get the results quickly. Your [facility] care team needs to get your results before you check in.  What should I do if I can't get tested at [facility]?  You can get tested somewhere else, but you'll need to take these extra steps:   Contact your family doctor or clinic to arrange your test.  Take the test within 4 days of your surgery or procedure. We can't accept tests older than 4 days.  Make sure you're getting an RT-PCR test, or a NAAT. Some places use \"rapid antigen\" tests, but we do NOT accept negative results from those tests before your surgery.  Make sure your doctor or clinic faxes your results to [facility] at [phone]. Or take a photo of the results and upload it into [employer].  If we don't get your results in time, we may have to delay or cancel your treatment.  For informational purposes only. Not to replace the advice of your health care provider. Copyright   2020 [facility] [employer]. All rights reserved. Clinically reviewed by Infection Prevention and the [facility] COVID-19 Clinical Team. SMARTworks 321899 - Rev 02/01/22.    "

## 2022-04-18 NOTE — RESULT ENCOUNTER NOTE
Already discussed on encounter today.    Patient has had sinus mark and RBBB in his EKG in Sept 2021 - seen in Care Everywhere.  Sent for lexiscan stress test since patient also has multiple cardiovascular risk factors.    The 10-year ASCVD risk score (Barbara GUZMAN Jr., et al., 2013) is: 48.3%    Values used to calculate the score:      Age: 76 years      Sex: Male      Is Non- : No      Diabetic: Yes      Tobacco smoker: No      Systolic Blood Pressure: 120 mmHg      Is BP treated: Yes      HDL Cholesterol: 35 mg/dL      Total Cholesterol: 207 mg/dL

## 2022-04-18 NOTE — PROGRESS NOTES
[facility]  [address] [address]  Phone: [phone]  Primary Provider: Gilberto Alonzo  Pre-op Performing Provider: GILBERTO ALONZO      PREOPERATIVE EVALUATION:  Today's date: 4/18/2022    Anil Gutierres Jr. is a 76 year old male who presents for a preoperative evaluation.    Surgical Information:  Surgery/Procedure: Left Knee Replacement  Surgery Location: [facility]  Surgeon: Dr Dany Oates  Surgery Date: 4/27/22  Time of Surgery: unknown, possibly 9:30  Where patient plans to recover: At home with family  Fax number for surgical facility: Note does not need to be faxed, will be available electronically in Epic.    Type of Anesthesia Anticipated: to be determined    Assessment & Plan     The proposed surgical procedure is considered INTERMEDIATE risk.    Preop general physical exam  - HEMOGLOBIN A1C  - EKG 12-lead complete w/read - Clinics  - Basic metabolic panel  - NM Lexiscan stress test  - HEMOGLOBIN A1C  - Basic metabolic panel    Primary osteoarthritis of left knee      Benign essential hypertension  Controlled.  Low salt, low fat diet.   Exercise as tolerated.  Take meds as prescribed; call if with side effects.   - EKG 12-lead complete w/read - Clinics  - Basic metabolic panel  - NM Lexiscan stress test  - Basic metabolic panel    Controlled type 2 diabetes mellitus without complication, without long-term current use of insulin (H)  Not on oral med nor injectables. Not on insulin. Diet controlled.  - HEMOGLOBIN A1C  - EKG 12-lead complete w/read - Clinics  - Basic metabolic panel  - NM Lexiscan stress test  - HEMOGLOBIN A1C  - Basic metabolic panel    Mixed hyperlipidemia  Reinforced heart healthy lifestyle.   - EKG 12-lead complete w/read - Clinics  - NM Lexiscan stress test    Sinus bradycardia  RBBB  Encounter for preprocedural cardiovascular examination   Patient has RBBB now and in previous EKG. He has multiple cardiovascular  risk factors. He has a high ASCVD score - see below.  Due to this, lexiscan stress test was ordered to stratify his perioperative risk/.  If lexiscan is abnormal, refer to cardiology.  - EKG 12-lead complete w/read - Clinics  - NM Lexiscan stress test    The 10-year ASCVD risk score (Barbara GUZMAN Jr., et al., 2013) is: 48.3%    Values used to calculate the score:      Age: 76 years      Sex: Male      Is Non- : No      Diabetic: Yes      Tobacco smoker: No      Systolic Blood Pressure: 120 mmHg      Is BP treated: Yes      HDL Cholesterol: 35 mg/dL      Total Cholesterol: 207 mg/dL     Stage 3a chronic kidney disease (H)  Monitor renal function perioperatively. Try to avoid large fluid shifts.  - NM Lexiscan stress test           Risks and Recommendations:  The patient has the following additional risks and recommendations for perioperative complications:   - Consult Hospitalist / IM to assist with post-op medical management  Cardiovascular:   - Pre-op stress imaging recommended due to current findings on history and exam that would warrant stress testing regardless of preoperative status  Diabetes:  - Patient is not on insulin therapy: regular NPO guidelines can be followed.     Medication Instructions:   - aspirin: Discontinue aspirin 7-10 days prior to procedure to reduce bleeding risk. It should be resumed postoperatively.    - ACE/ARB: May be continued on the day of surgery.    - Beta Blockers: Continue taking on the day of surgery.   - ibuprofen (Advil, Motrin): HOLD 1 day before surgery.     RECOMMENDATION:  Patient referred to cardiac clinic for evaluation before surgery. Surgery approval pending completion of test and reviewing its result.    Subjective     HPI related to upcoming procedure: Patient has osteoarthritis of the left knee causing chronic pain. Hence, planned surgery. Reviewed above with patient.    Preop Questions 4/15/2022   1. Have you ever had a heart attack or stroke? No    2. Have you ever had surgery on your heart or blood vessels, such as a stent placement, a coronary artery bypass, or surgery on an artery in your head, neck, heart, or legs? No   3. Do you have chest pain with activity? No   4. Do you have a history of  heart failure? No   5. Do you currently have a cold, bronchitis or symptoms of other infection? No   6. Do you have a cough, shortness of breath, or wheezing? No   7. Do you or anyone in your family have previous history of blood clots? UNKNOWN - Pt not aware of any, father did have strokes.   8. Do you or does anyone in your family have a serious bleeding problem such as prolonged bleeding following surgeries or cuts? No   9. Have you ever had problems with anemia or been told to take iron pills? No   10. Have you had any abnormal blood loss such as black, tarry or bloody stools? No   11. Have you ever had a blood transfusion? No   12. Are you willing to have a blood transfusion if it is medically needed before, during, or after your surgery? Yes   13. Have you or any of your relatives ever had problems with anesthesia? No   14. Do you have sleep apnea, excessive snoring or daytime drowsiness? No   14a. Do you have a CPAP machine? -   15. Do you have any artifical heart valves or other implanted medical devices like a pacemaker, defibrillator, or continuous glucose monitor? No   16. Do you have artificial joints? YES - right hip replacement   17. Are you allergic to latex? No       Health Care Directive:  Patient does not have a Health Care Directive or Living Will: Discussed advance care planning with patient; information given to patient to review.    Preoperative Review of :   reviewed - no record of controlled substances prescribed.      Status of Chronic Conditions:  See problem list for active medical problems.  Problems all longstanding and stable, except as noted/documented.  See ROS for pertinent symptoms related to these conditions.    DIABETES -  Patient has a longstanding history of DiabetesType Type II . Patient is being treated with diet and denies significant side effects. Control has been good. Complicating factors include but are not limited to: hypertension, hyperlipidemia, chronic kidney disease and morbid obesity .     HYPERLIPIDEMIA - Patient has a long history of significant Hyperlipidemia requiring medication for treatment with recent good control. Patient reports no problems or side effects with the medication.     HYPERTENSION - Patient has longstanding history of HTN , currently denies any symptoms referable to elevated blood pressure. Specifically denies chest pain, palpitations, dyspnea, orthopnea, PND or peripheral edema. Blood pressure readings have been in normal range. Current medication regimen is as listed below. Patient is not on statin at this time.     RENAL INSUFFICIENCY - Patient has a longstanding history of moderate-severe chronic renal insufficiency. Last Cr stable.       Review of Systems  CONSTITUTIONAL: NEGATIVE for fever, chills, change in weight  INTEGUMENTARY/SKIN: NEGATIVE for worrisome rashes, moles or lesions  EYES: NEGATIVE for vision changes or irritation  ENT/MOUTH: NEGATIVE for ear, mouth and throat problems  RESP: NEGATIVE for significant cough or SOB  CV: NEGATIVE for chest pain, palpitations or peripheral edema  GI: NEGATIVE for nausea, abdominal pain, heartburn, or change in bowel habits  : NEGATIVE for frequency, dysuria, or hematuria  MUSCULOSKELETAL:left knee pain  NEURO: NEGATIVE for weakness, dizziness or paresthesias  ENDOCRINE: NEGATIVE for temperature intolerance, skin/hair changes  HEME: NEGATIVE for bleeding problems  PSYCHIATRIC: NEGATIVE for changes in mood or affect    Patient Active Problem List    Diagnosis Date Noted     Stage 3a chronic kidney disease (H) 04/18/2022     Priority: Medium      Past Medical History:   Diagnosis Date     Arthritis 2018    L knee, R hip     Basal cell carcinoma   "    Diabetes (H)     Type 2     Hypertension      Thyroid disease     Hypothyroidism     Past Surgical History:   Procedure Laterality Date     BIOPSY      Tongue     COLONOSCOPY  2017     EYE SURGERY  2019    cataracts     GLOSSECTOMY PARTIAL Right 2021    Procedure: GLOSSECTOMY, PARTIAL;  Surgeon: Rojelio Horton MD;  Location: [facility]     ORTHOPEDIC SURGERY  dec  23, 2020    R Hip     Current Outpatient Medications   Medication Sig Dispense Refill     aspirin 81 MG EC tablet Take 81 mg by mouth daily       atenolol (TENORMIN) 25 MG tablet Take 0.5 tablets (12.5 mg) by mouth daily 15 tablet 0     furosemide (LASIX) 20 MG tablet Take 20 mg by mouth as needed       Glucose Blood (BLOOD GLUCOSE TEST STRIPS) STRP by In Vitro route daily       levothyroxine (SYNTHROID/LEVOTHROID) 137 MCG tablet Take 137 mcg by mouth       lisinopril (ZESTRIL) 20 MG tablet Take 20 mg by mouth Taking 1/2 tablet per day       loratadine (CLARITIN) 10 MG tablet Take 10 mg by mouth  (Patient not taking: No sig reported)         Allergies   Allergen Reactions     Amoxicillin GI Disturbance     N, V,  D     when     took   for   dental prophylaxis      Seasonal Allergies      Simvastatin Muscle Pain (Myalgia)        Social History     Tobacco Use     Smoking status: Former Smoker     Packs/day: 1.00     Years: 20.00     Pack years: 20.00     Types: Cigarettes     Start date: 1964     Quit date: 1984     Years since quittin.3     Smokeless tobacco: Never Used   Substance Use Topics     Alcohol use: Yes     Comment: one beer or mixed drink a day in warmer weather     Family History   Problem Relation Age of Onset     Arthritis Mother      Cerebrovascular Disease Father      Diabetes Sister      History   Drug Use Unknown         Objective     /68   Pulse 54   Temp (!) 96.6  F (35.9  C) (Tympanic)   Resp 16   Ht 1.772 m (5' 9.75\")   Wt 114.4 kg (252 lb 3.2 oz)   SpO2 98%   BMI 36.45 kg/m  "     Physical Exam  GENERAL APPEARANCE: moridly obese,  alert and no distress; ambulatory w/o assist  EYES: pink conj, no icterus, PERRL, EOMI  HENT: ear canals and TM's normal, nose and mouth without ulcers or lesions, oropharynx clear and oral mucous membranes moist  NECK: no adenopathy, no asymmetry, masses, or scars and thyroid normal to palpation  RESP: lungs clear to auscultation - no rales, rhonchi or wheezes  CV: regular rates and rhythm, normal S1 S2, no S3 or S4, no murmur, click or rub, no peripheral edema and peripheral pulses strong  ABDOMEN: soft, nontender, no hepatosplenomegaly, no masses and bowel sounds normal  MS: no musculoskeletal defects are noted and gait is age appropriate without ataxia  SKIN: good turgor, no rash/jaundice/ecchymosis  NEURO: Normal strength and tone, sensory exam grossly normal, mentation intact and speech normal    Recent Labs   Lab Test 11/29/21  1116      POTASSIUM 4.4   CR 1.62*   A1C 6.3*        Diagnostics:  Labs pending at this time.  Results will be reviewed when available.   EKG required for RBBB in previous EKG, multiple cardiac risk factors and not completed in the last 90 days.   EKG: sinus bradycardia, normal axis, normal intervals, no acute ST/T changes c/w ischemia, no LVH by voltage criteria, Right Bundle Branch Block, RBBB was present in his EKG in Sept 2021.    4/19/2022:  Patient's BMP at time of preop visit showed improved creatinine and GFR but still in the CKD range.  A1c at time of preop is within goal range indicating good diabetes control.    Revised Cardiac Risk Index (RCRI):  The patient has the following serious cardiovascular risks for perioperative complications:   - No serious cardiac risks = 0 points   lexiscan result still pending    RCRI Interpretation: 0 points: Class I (very low risk - 0.4% complication rate)  May change depending on lexiscan result           Signed Electronically by: Gilberto Alonzo MD  Copy of this evaluation  report is provided to requesting physician.

## 2022-04-19 ENCOUNTER — HOSPITAL ENCOUNTER (OUTPATIENT)
Dept: NUCLEAR MEDICINE | Facility: CLINIC | Age: 77
Setting detail: NUCLEAR MEDICINE
Discharge: HOME | End: 2022-04-19
Attending: FAMILY MEDICINE
Payer: COMMERCIAL

## 2022-04-19 ENCOUNTER — TELEPHONE (OUTPATIENT)
Dept: CARDIOLOGY | Facility: CLINIC | Age: 77
End: 2022-04-19

## 2022-04-19 ENCOUNTER — HOSPITAL ENCOUNTER (OUTPATIENT)
Dept: CARDIOLOGY | Facility: CLINIC | Age: 77
Setting detail: NUCLEAR MEDICINE
Discharge: HOME | End: 2022-04-19
Attending: FAMILY MEDICINE
Payer: COMMERCIAL

## 2022-04-19 DIAGNOSIS — N18.31 STAGE 3A CHRONIC KIDNEY DISEASE (H): ICD-10-CM

## 2022-04-19 DIAGNOSIS — I10 BENIGN ESSENTIAL HYPERTENSION: ICD-10-CM

## 2022-04-19 DIAGNOSIS — R00.1 SINUS BRADYCARDIA: ICD-10-CM

## 2022-04-19 DIAGNOSIS — E78.2 MIXED HYPERLIPIDEMIA: ICD-10-CM

## 2022-04-19 DIAGNOSIS — E11.9 CONTROLLED TYPE 2 DIABETES MELLITUS WITHOUT COMPLICATION, WITHOUT LONG-TERM CURRENT USE OF INSULIN (H): ICD-10-CM

## 2022-04-19 DIAGNOSIS — Z01.810 ENCOUNTER FOR PREPROCEDURAL CARDIOVASCULAR EXAMINATION: ICD-10-CM

## 2022-04-19 DIAGNOSIS — Z01.818 PREOP GENERAL PHYSICAL EXAM: ICD-10-CM

## 2022-04-19 DIAGNOSIS — I45.10 RBBB: ICD-10-CM

## 2022-04-19 LAB
CV BLOOD PRESSURE: 62 MMHG
CV STRESS MAX HR HE: 91
NUC STRESS EJECTION FRACTION: 65 %
RATE PRESSURE PRODUCT: NORMAL
STRESS ECHO BASELINE DIASTOLIC HE: 78
STRESS ECHO BASELINE HR: 57 BPM
STRESS ECHO BASELINE SYSTOLIC BP: 152
STRESS ECHO CALCULATED PERCENT HR: 63 %
STRESS ECHO LAST STRESS DIASTOLIC BP: 88
STRESS ECHO LAST STRESS SYSTOLIC BP: 184
STRESS ECHO TARGET HR: 145

## 2022-04-19 PROCEDURE — 343N000001 HC RX 343: Performed by: FAMILY MEDICINE

## 2022-04-19 PROCEDURE — 93018 CV STRESS TEST I&R ONLY: CPT | Performed by: INTERNAL MEDICINE

## 2022-04-19 PROCEDURE — A9502 TC99M TETROFOSMIN: HCPCS | Performed by: FAMILY MEDICINE

## 2022-04-19 PROCEDURE — 78452 HT MUSCLE IMAGE SPECT MULT: CPT

## 2022-04-19 PROCEDURE — 93016 CV STRESS TEST SUPVJ ONLY: CPT | Performed by: INTERNAL MEDICINE

## 2022-04-19 PROCEDURE — 250N000011 HC RX IP 250 OP 636: Performed by: FAMILY MEDICINE

## 2022-04-19 PROCEDURE — 93017 CV STRESS TEST TRACING ONLY: CPT | Performed by: REHABILITATION PRACTITIONER

## 2022-04-19 PROCEDURE — 78452 HT MUSCLE IMAGE SPECT MULT: CPT | Mod: 26 | Performed by: INTERNAL MEDICINE

## 2022-04-19 PROCEDURE — 93017 CV STRESS TEST TRACING ONLY: CPT

## 2022-04-19 RX ORDER — REGADENOSON 0.08 MG/ML
0.4 INJECTION, SOLUTION INTRAVENOUS ONCE
Status: COMPLETED | OUTPATIENT
Start: 2022-04-19 | End: 2022-04-19

## 2022-04-19 RX ADMIN — REGADENOSON 0.4 MG: 0.08 INJECTION, SOLUTION INTRAVENOUS at 09:32

## 2022-04-19 RX ADMIN — TETROFOSMIN 36.7 MCI.: 1.38 INJECTION, POWDER, LYOPHILIZED, FOR SOLUTION INTRAVENOUS at 09:45

## 2022-04-19 RX ADMIN — TETROFOSMIN 12.4 MCI.: 1.38 INJECTION, POWDER, LYOPHILIZED, FOR SOLUTION INTRAVENOUS at 08:15

## 2022-04-19 NOTE — TELEPHONE ENCOUNTER
Health Call Center    Phone Message    May a detailed message be left on voicemail: yes     Reason for Call: Appointment Intake    Referring Provider Name: Dr. Gilberto Alonzo   Diagnosis and/or Symptoms: Preop general physical exam [Z01.818]  Additional info: Abnormal lexiscan stress test; pt will be undergoing total knee arthroplasty  Priority: Urgent: 3-5 Days    Writer attempted to schedule patient in the [facility] for cardio clearance. The next available appointment was 06/20. Please reach out to Don to discuss earlier scheduling options. Thanks!    Action Taken: Message routed to:  Other: [facility]    Travel Screening: Not Applicable

## 2022-04-20 ENCOUNTER — MYC MEDICAL ADVICE (OUTPATIENT)
Dept: FAMILY MEDICINE | Facility: CLINIC | Age: 77
End: 2022-04-20
Payer: COMMERCIAL

## 2022-04-21 NOTE — TELEPHONE ENCOUNTER
Received a MyChart message from patient that he cancelled a cardiology consult, and decided to see cardiology only in June 20, 2022. I have concerns about his abnormal lexiscan, so he was advised today to contact the heart clinic to schedule the cancelled appointment again. Please assist patient in scheduling.

## 2022-04-22 NOTE — TELEPHONE ENCOUNTER
Routed to provider.  FYI.    Please see MyChart message from pt.  Pt was able to get in sooner with cardiology on May 9th.    Brisa Adam RN

## 2022-05-09 ENCOUNTER — OFFICE VISIT (OUTPATIENT)
Dept: CARDIOLOGY | Facility: CLINIC | Age: 77
End: 2022-05-09
Attending: FAMILY MEDICINE
Payer: COMMERCIAL

## 2022-05-09 VITALS
BODY MASS INDEX: 36.94 KG/M2 | OXYGEN SATURATION: 98 % | WEIGHT: 255.6 LBS | RESPIRATION RATE: 16 BRPM | SYSTOLIC BLOOD PRESSURE: 144 MMHG | DIASTOLIC BLOOD PRESSURE: 69 MMHG | HEART RATE: 54 BPM | TEMPERATURE: 97.9 F

## 2022-05-09 DIAGNOSIS — E11.9 CONTROLLED TYPE 2 DIABETES MELLITUS WITHOUT COMPLICATION, WITHOUT LONG-TERM CURRENT USE OF INSULIN (H): ICD-10-CM

## 2022-05-09 DIAGNOSIS — E78.2 MIXED HYPERLIPIDEMIA: ICD-10-CM

## 2022-05-09 DIAGNOSIS — I45.10 RBBB: ICD-10-CM

## 2022-05-09 DIAGNOSIS — I10 BENIGN ESSENTIAL HYPERTENSION: ICD-10-CM

## 2022-05-09 DIAGNOSIS — Z01.818 PREOP GENERAL PHYSICAL EXAM: ICD-10-CM

## 2022-05-09 DIAGNOSIS — R93.1 ABNORMAL NUCLEAR CARDIAC IMAGING TEST: ICD-10-CM

## 2022-05-09 DIAGNOSIS — R00.1 SINUS BRADYCARDIA: ICD-10-CM

## 2022-05-09 DIAGNOSIS — N18.31 STAGE 3A CHRONIC KIDNEY DISEASE (H): ICD-10-CM

## 2022-05-09 PROCEDURE — 99204 OFFICE O/P NEW MOD 45 MIN: CPT | Performed by: INTERNAL MEDICINE

## 2022-05-09 NOTE — PROGRESS NOTES
Cardiology Consultation       Assessment & Plan     1.  Preoperative clearance for left total knee arthroplasty at [facility]  2.  Diabetes mellitus  3.  Chronic kidney disease  4.  Hypertension  5.  Hyperlipidemia  6.  Hypothyroidism  7.  Seasonal allergies    Recommendations    1.  Preoperative clearance: Reviewed his recent stress test which either represents apical thinning with a small amount of ischemia and or artifact.  LV systolic function is preserved and his EKG is at baseline and his cardiac exam is normal.  Therefore we would recommend he proceed with his knee surgery without any additional testing needed.    2.  Hyperlipidemia: Had a reaction with simvastatin in the past.  He had myalgias.  We have advised him long-term after he recovers from his knee surgery he should consider a rechallenge with a newer statin such as Crestor or can consider Repatha if this is not tolerated.  I will leave this in the capable hands of PCP colleagues.    3.  We have wished him all the best on his upcoming surgery, follow-up with cardiology on an as-needed basis.    Patti Holland MD, MD        HPI:    Patient is a 76-year-old gentleman with a history of orthopedic arthritic complaints and recent surgeries.  He is in need of preoperative clearance for planned left knee surgery.  His surgery was canceled after a stress test was performed which is as noted below.  Patient recently underwent hip surgery and tongue surgery using general anesthesia within the past year with no limitations or cardiovascular sequelae with this.  Clinically he reports of no chest pain PND orthopnea or endocardiac related concerns.  He owns 3 acres of property and is always out clearing of brush without limitations at all.  He has comorbidities as noted above including diabetes mellitus, hypothyroidism, hypertension and hyperlipidemia.  He also has chronic renal insufficiencies.  Here to discuss findings and plan for additional  care.  His EKG demonstrates sinus bradycardia with a right bundle branch block.  Review of care everywhere suggest that this is not a new finding.        MPI: 2022        The nuclear stress test is abnormal. There is a small area of mild ischemia in the apical segment(s) of the left ventricle. The left ventricular ejection fraction at rest is 62%.  The left ventricular ejection fraction at stress is 65%. Left ventricular function is normal.     There is no prior study for comparison.          Patient Active Problem List   Diagnosis     Stage 3a chronic kidney disease (H)       Past Medical History   I have reviewed this patient's medical history and updated it with pertinent information if needed.   Past Medical History:   Diagnosis Date     Arthritis 2018    L knee, R hip     Basal cell carcinoma      Diabetes (H) 2000    Type 2     Hypertension 2000     Thyroid disease 2000    Hypothyroidism       Past Surgical History   I have reviewed this patient's surgical history and updated it with pertinent information if needed.  Past Surgical History:   Procedure Laterality Date     BIOPSY  2021    Tongue     COLONOSCOPY  2017     EYE SURGERY  2019    cataracts     GLOSSECTOMY PARTIAL Right 12/2/2021    Procedure: GLOSSECTOMY, PARTIAL;  Surgeon: Rojelio Horton MD;  Location: [facility]     ORTHOPEDIC SURGERY  dec  23, 2020    R Hip       Prior to Admission Medications   Cannot display prior to admission medications because the patient has not been admitted in this contact.     [unfilled]  [unfilled]  Allergies   Allergies   Allergen Reactions     Amoxicillin GI Disturbance     N, V,  D   6/21  when     took   for   dental prophylaxis      Seasonal Allergies      Simvastatin Muscle Pain (Myalgia)       Social History    reports that he quit smoking about 38 years ago. His smoking use included cigarettes. He started smoking about 58 years ago. He has a 20.00 pack-year smoking history. He has never used smokeless tobacco. He  reports current alcohol use. He reports that he does not use drugs.    Family History   Family History   Problem Relation Age of Onset     Arthritis Mother      Cerebrovascular Disease Father      Diabetes Sister        Review of Systems   The comprehensive 10 point Review of Systems is negative other than noted in the HPI or here.     Physical Exam   Vital Signs with Ranges     Wt Readings from Last 4 Encounters:   04/18/22 114.4 kg (252 lb 3.2 oz)   01/20/22 118.7 kg (261 lb 9.6 oz)   01/03/22 117 kg (258 lb)   12/02/21 117 kg (258 lb)     [unfilled]      Vitals: There were no vitals taken for this visit.     GENERAL: Healthy, alert and no distress  EYES: Eyes grossly normal to inspection.  No discharge or erythema, or obvious scleral/conjunctival abnormalities.  RESP: No audible wheeze, cough, or visible cyanosis.  No visible retractions or increased work of breathing.    SKIN: Visible skin clear. No significant rash, abnormal pigmentation or lesions.  NEURO: Cranial nerves grossly intact.  Mentation and speech appropriate for age.  PSYCH: Mentation appears normal, affect normal/bright, judgement and insight intact, normal speech and appearance well-groomed.

## 2022-05-09 NOTE — LETTER
5/9/2022    Gilberto Alonzo MD  [address] [address]    RE: Anil Gutierres Jr.       Dear Colleague,     I had the pleasure of seeing Anil Gutierres Jr. in the [facility].      Cardiology Consultation       Assessment & Plan     1.  Preoperative clearance for left total knee arthroplasty at [facility]  2.  Diabetes mellitus  3.  Chronic kidney disease  4.  Hypertension  5.  Hyperlipidemia  6.  Hypothyroidism  7.  Seasonal allergies    Recommendations    1.  Preoperative clearance: Reviewed his recent stress test which either represents apical thinning with a small amount of ischemia and or artifact.  LV systolic function is preserved and his EKG is at baseline and his cardiac exam is normal.  Therefore we would recommend he proceed with his knee surgery without any additional testing needed.    2.  Hyperlipidemia: Had a reaction with simvastatin in the past.  He had myalgias.  We have advised him long-term after he recovers from his knee surgery he should consider a rechallenge with a newer statin such as Crestor or can consider Repatha if this is not tolerated.  I will leave this in the capable hands of PCP colleagues.    3.  We have wished him all the best on his upcoming surgery, follow-up with cardiology on an as-needed basis.    Patti Holland MD, MD        HPI:    Patient is a 76-year-old gentleman with a history of orthopedic arthritic complaints and recent surgeries.  He is in need of preoperative clearance for planned left knee surgery.  His surgery was canceled after a stress test was performed which is as noted below.  Patient recently underwent hip surgery and tongue surgery using general anesthesia within the past year with no limitations or cardiovascular sequelae with this.  Clinically he reports of no chest pain PND orthopnea or endocardiac related concerns.  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  He has comorbidities as noted above including diabetes mellitus, hypothyroidism, hypertension and hyperlipidemia.  He also has chronic renal insufficiencies.  Here to discuss findings and plan for additional care.  His EKG demonstrates sinus bradycardia with a right bundle branch block.  Review of care everywhere suggest that this is not a new finding.        MPI: 2022        The nuclear stress test is abnormal. There is a small area of mild ischemia in the apical segment(s) of the left ventricle. The left ventricular ejection fraction at rest is 62%.  The left ventricular ejection fraction at stress is 65%. Left ventricular function is normal.     There is no prior study for comparison.          Patient Active Problem List   Diagnosis     Stage 3a chronic kidney disease (H)       Past Medical History   I have reviewed this patient's medical history and updated it with pertinent information if needed.   Past Medical History:   Diagnosis Date     Arthritis 2018    L knee, R hip     Basal cell carcinoma      Diabetes (H) 2000    Type 2     Hypertension 2000     Thyroid disease 2000    Hypothyroidism       Past Surgical History   I have reviewed this patient's surgical history and updated it with pertinent information if needed.  Past Surgical History:   Procedure Laterality Date     BIOPSY  2021    Tongue     COLONOSCOPY  2017     EYE SURGERY  2019    cataracts     GLOSSECTOMY PARTIAL Right 12/2/2021    Procedure: GLOSSECTOMY, PARTIAL;  Surgeon: Rojelio Horton MD;  Location: [facility]     ORTHOPEDIC SURGERY  dec  23, 2020    R Hip       Prior to Admission Medications   Cannot display prior to admission medications because the patient has not been admitted in this contact.     [unfilled]  [unfilled]  Allergies   Allergies   Allergen Reactions     Amoxicillin GI Disturbance     N, V,  D   6/21  when     took   for   dental prophylaxis      Seasonal Allergies      Simvastatin Muscle Pain (Myalgia)        Social History    reports that he quit smoking about 38 years ago. His smoking use included cigarettes. He started smoking about 58 years ago. He has a 20.00 pack-year smoking history. He has never used smokeless tobacco. He reports current alcohol use. He reports that he does not use drugs.    Family History   Family History   Problem Relation Age of Onset     Arthritis Mother      Cerebrovascular Disease Father      Diabetes Sister        Review of Systems   The comprehensive 10 point Review of Systems is negative other than noted in the HPI or here.     Physical Exam   Vital Signs with Ranges     Wt Readings from Last 4 Encounters:   04/18/22 114.4 kg (252 lb 3.2 oz)   01/20/22 118.7 kg (261 lb 9.6 oz)   01/03/22 117 kg (258 lb)   12/02/21 117 kg (258 lb)     [unfilled]      Vitals: There were no vitals taken for this visit.     GENERAL: Healthy, alert and no distress  EYES: Eyes grossly normal to inspection.  No discharge or erythema, or obvious scleral/conjunctival abnormalities.  RESP: No audible wheeze, cough, or visible cyanosis.  No visible retractions or increased work of breathing.    SKIN: Visible skin clear. No significant rash, abnormal pigmentation or lesions.  NEURO: Cranial nerves grossly intact.  Mentation and speech appropriate for age.  PSYCH: Mentation appears normal, affect normal/bright, judgement and insight intact, normal speech and appearance well-groomed.      Thank you for allowing me to participate in the care of your patient.      Sincerely,     Patti Holland MD     [facility]  cc:   Gilberto Alonzo MD  [address] [place] [address]

## 2022-05-31 ENCOUNTER — MYC MEDICAL ADVICE (OUTPATIENT)
Dept: FAMILY MEDICINE | Facility: CLINIC | Age: 77
End: 2022-05-31
Payer: COMMERCIAL

## 2022-05-31 DIAGNOSIS — E03.9 HYPOTHYROIDISM, UNSPECIFIED TYPE: Primary | ICD-10-CM

## 2022-06-02 RX ORDER — LEVOTHYROXINE SODIUM 137 UG/1
137 TABLET ORAL DAILY
Qty: 90 TABLET | Refills: 0 | Status: SHIPPED | OUTPATIENT
Start: 2022-06-02 | End: 2022-08-19

## 2022-06-27 ENCOUNTER — OFFICE VISIT (OUTPATIENT)
Dept: FAMILY MEDICINE | Facility: CLINIC | Age: 77
End: 2022-06-27
Payer: COMMERCIAL

## 2022-06-27 VITALS
HEART RATE: 50 BPM | RESPIRATION RATE: 16 BRPM | DIASTOLIC BLOOD PRESSURE: 76 MMHG | WEIGHT: 246.6 LBS | OXYGEN SATURATION: 99 % | BODY MASS INDEX: 35.3 KG/M2 | TEMPERATURE: 97.3 F | SYSTOLIC BLOOD PRESSURE: 124 MMHG | HEIGHT: 70 IN

## 2022-06-27 DIAGNOSIS — R00.1 SINUS BRADYCARDIA: ICD-10-CM

## 2022-06-27 DIAGNOSIS — Z23 HIGH PRIORITY FOR 2019-NCOV VACCINE: ICD-10-CM

## 2022-06-27 DIAGNOSIS — M17.12 PRIMARY OSTEOARTHRITIS OF LEFT KNEE: ICD-10-CM

## 2022-06-27 DIAGNOSIS — E78.2 MIXED HYPERLIPIDEMIA: ICD-10-CM

## 2022-06-27 DIAGNOSIS — I25.10 CORONARY ARTERY DISEASE INVOLVING NATIVE HEART WITHOUT ANGINA PECTORIS, UNSPECIFIED VESSEL OR LESION TYPE: ICD-10-CM

## 2022-06-27 DIAGNOSIS — N18.31 STAGE 3A CHRONIC KIDNEY DISEASE (H): ICD-10-CM

## 2022-06-27 DIAGNOSIS — Z01.818 PREOP GENERAL PHYSICAL EXAM: Primary | ICD-10-CM

## 2022-06-27 DIAGNOSIS — I45.10 RBBB: ICD-10-CM

## 2022-06-27 DIAGNOSIS — E11.9 CONTROLLED TYPE 2 DIABETES MELLITUS WITHOUT COMPLICATION, WITHOUT LONG-TERM CURRENT USE OF INSULIN (H): ICD-10-CM

## 2022-06-27 PROCEDURE — 99214 OFFICE O/P EST MOD 30 MIN: CPT | Mod: 25 | Performed by: FAMILY MEDICINE

## 2022-06-27 PROCEDURE — 0064A COVID-19,PF,MODERNA (18+ YRS BOOSTER .25ML): CPT | Performed by: FAMILY MEDICINE

## 2022-06-27 PROCEDURE — 91306 COVID-19,PF,MODERNA (18+ YRS BOOSTER .25ML): CPT | Performed by: FAMILY MEDICINE

## 2022-06-27 ASSESSMENT — PAIN SCALES - GENERAL: PAINLEVEL: NO PAIN (0)

## 2022-06-27 NOTE — PROGRESS NOTES
[facility]  [address] [address]  Phone: [phone]  Primary Provider: Gilberto Alonzo  Pre-op Performing Provider: GILBERTO ALONZO      PREOPERATIVE EVALUATION:  Today's date: 6/27/2022    Anil Gutierres Jr. is a 76 year old male who presents for a preoperative evaluation.    Surgical Information:  Surgery/Procedure: Left total knee arthroplasty  Surgery Location: [facility]  Surgeon: Dr Oates  Surgery Date: 7/13/22  Time of Surgery: to be determined  Where patient plans to recover: At home with family  Fax number for surgical facility: Note does not need to be faxed, will be available electronically in Epic.    Type of Anesthesia Anticipated: to be determined    Assessment & Plan     The proposed surgical procedure is considered INTERMEDIATE risk.    Preop general physical exam    Primary osteoarthritis of left knee    Controlled type 2 diabetes mellitus without complication, without long-term current use of insulin (H)  Not on OHA or insulin.  Continue carb-controlled diet.    Mixed hyperlipidemia  Reinforced heart healthy lifestyle.   Continue statin    RBBB  Asymptomatic.  EKG today did not show RBBB but showed bradycardia.  Monitor EKG perioperatively and refer to cardiolog as appropriate.    Sinus bradycardia  Asymptomatic.  Monitor HR perioperatively    Stage 3a chronic kidney disease (H)  Stable renal function on recent labs.  Maintain hydration. Avoid nephrotoxins.    Coronary artery disease (small area of ischemia on lexiscan in April 2022).  Per cardiology he may proceed with planned knee surgery with no additional testing or treatment.    High priority for 2019-nCoV vaccine  - COVID-19,PF,MODERNA (18+ Yrs BOOSTER .25mL)           Risks and Recommendations:  The patient has the following additional risks and recommendations for perioperative complications:   - Morbid obesity (BMI >40)    Medication Instructions:  Patient is to  take all scheduled medications on the day of surgery EXCEPT for modifications listed below:   - aspirin: Discontinue aspirin 7-10 days prior to procedure to reduce bleeding risk. It should be resumed postoperatively.    - ACE/ARB: May be continued on the day of surgery.    - Beta Blockers: Continue taking on the day of surgery.   - Diuretics: May continue.    RECOMMENDATION:  APPROVAL GIVEN to proceed with proposed procedure, without further diagnostic evaluation.      Subjective     HPI related to upcoming procedure: Patient has advanced and painful left knee osteoarthritis. Hence, planned surgery. Reviewed above with patient.    Preop Questions 6/22/2022   1. Have you ever had a heart attack or stroke? No   2. Have you ever had surgery on your heart or blood vessels, such as a stent placement, a coronary artery bypass, or surgery on an artery in your head, neck, heart, or legs? No   3. Do you have chest pain with activity? No   4. Do you have a history of  heart failure? No   5. Do you currently have a cold, bronchitis or symptoms of other infection? No   6. Do you have a cough, shortness of breath, or wheezing? No   7. Do you or anyone in your family have previous history of blood clots? UNKNOWN - pt unaware    8. Do you or does anyone in your family have a serious bleeding problem such as prolonged bleeding following surgeries or cuts? No   9. Have you ever had problems with anemia or been told to take iron pills? No   10. Have you had any abnormal blood loss such as black, tarry or bloody stools? No   11. Have you ever had a blood transfusion? No   12. Are you willing to have a blood transfusion if it is medically needed before, during, or after your surgery? Yes   13. Have you or any of your relatives ever had problems with anesthesia? No   14. Do you have sleep apnea, excessive snoring or daytime drowsiness? No   14a. Do you have a CPAP machine? -   15. Do you have any artifical heart valves or other  implanted medical devices like a pacemaker, defibrillator, or continuous glucose monitor? No   16. Do you have artificial joints? YES - right hip replacement - patient denies encountering any complications   17. Are you allergic to latex? No       Health Care Directive:  Patient does not have a Health Care Directive or Living Will: Patient states has Advance Directive and will bring in a copy to clinic.    Preoperative Review of :   reviewed - no record of controlled substances prescribed.      Status of Chronic Conditions:  See problem list for active medical problems.  Problems all longstanding and stable, except as noted/documented.  See ROS for pertinent symptoms related to these conditions.    DIABETES - Patient has a longstanding history of DiabetesType Type II . Patient is being treated with diet and denies significant side effects. Control has been good. Complicating factors include but are not limited to: hypertension, hyperlipidemia, chronic kidney disease and morbid obesity .     HYPERTENSION - Patient has longstanding history of HTN , currently denies any symptoms referable to elevated blood pressure. Specifically denies chest pain, palpitations, dyspnea, orthopnea, PND or peripheral edema. Blood pressure readings have been in normal range. Current medication regimen is as listed below. Patient denies any side effects of medication.     RENAL INSUFFICIENCY - Patient has a longstanding history of moderate-severe chronic renal insufficiency. Last Cr stable at baseline but slighlty high..       Review of Systems  CONSTITUTIONAL: NEGATIVE for fever, chills, change in weight  INTEGUMENTARY/SKIN: NEGATIVE for worrisome rashes, moles or lesions  EYES: NEGATIVE for vision changes or irritation  ENT/MOUTH: NEGATIVE for ear, mouth and throat problems  RESP: NEGATIVE for significant cough or SOB  CV: NEGATIVE for chest pain, palpitations or peripheral edema  GI: NEGATIVE for nausea, abdominal pain, heartburn,  or change in bowel habits  : NEGATIVE for frequency, dysuria, or hematuria  MUSCULOSKELETAL:chronic knee pain  NEURO: NEGATIVE for weakness, dizziness or paresthesias  ENDOCRINE: NEGATIVE for temperature intolerance, skin/hair changes  HEME: NEGATIVE for bleeding problems  PSYCHIATRIC: NEGATIVE for changes in mood or affect    Patient Active Problem List    Diagnosis Date Noted     Stage 3a chronic kidney disease (H) 04/18/2022     Priority: Medium      Past Medical History:   Diagnosis Date     Arthritis 2018    L knee, R hip     Basal cell carcinoma      Diabetes (H) 2000    Type 2     Hypertension 2000     Thyroid disease 2000    Hypothyroidism     Past Surgical History:   Procedure Laterality Date     BIOPSY  2021    Tongue     COLONOSCOPY  2017     EYE SURGERY  2019    cataracts     GLOSSECTOMY PARTIAL Right 12/2/2021    Procedure: GLOSSECTOMY, PARTIAL;  Surgeon: Rojelio Horton MD;  Location: [facility]     ORTHOPEDIC SURGERY  dec  23, 2020    R Hip     Current Outpatient Medications   Medication Sig Dispense Refill     aspirin 81 MG EC tablet Take 81 mg by mouth daily       atenolol (TENORMIN) 25 MG tablet Take 0.5 tablets (12.5 mg) by mouth daily 15 tablet 0     levothyroxine (SYNTHROID/LEVOTHROID) 137 MCG tablet Take 1 tablet (137 mcg) by mouth daily 90 tablet 0     lisinopril (ZESTRIL) 20 MG tablet Take 20 mg by mouth Taking 1/2 tablet per day       loratadine (CLARITIN) 10 MG tablet Take 10 mg by mouth       furosemide (LASIX) 20 MG tablet Take 20 mg by mouth as needed (Patient not taking: Reported on 6/27/2022)       Glucose Blood (BLOOD GLUCOSE TEST STRIPS) STRP by In Vitro route daily         Allergies   Allergen Reactions     Amoxicillin GI Disturbance     N, V,  D   6/21  when     took   for   dental prophylaxis      Seasonal Allergies      Simvastatin Muscle Pain (Myalgia)        Social History     Tobacco Use     Smoking status: Former Smoker     Packs/day: 1.00     Years: 20.00     Pack years: 20.00  "    Types: Cigarettes     Start date: 1964     Quit date: 1984     Years since quittin.5     Smokeless tobacco: Never Used   Substance Use Topics     Alcohol use: Yes     Comment: one beer or mixed drink a day in warmer weather     Family History   Problem Relation Age of Onset     Arthritis Mother      Cerebrovascular Disease Father      Diabetes Sister      History   Drug Use Unknown         Objective     /76   Pulse 50   Temp 97.3  F (36.3  C) (Tympanic)   Resp 16   Ht 1.772 m (5' 9.75\")   Wt 111.9 kg (246 lb 9.6 oz)   SpO2 99%   BMI 35.64 kg/m      Physical Exam  GENERAL APPEARANCE: alert and no distress; ambulatory w/o assist  EYES: pink conj, no icterus, PERRL, EOMI  HENT: ear canals and TM's normal, nose and mouth without ulcers or lesions, oropharynx clear and oral mucous membranes moist  NECK: no adenopathy, no asymmetry, masses, or scars and thyroid normal to palpation  RESP: lungs clear to auscultation - no rales, rhonchi or wheezes  CV: regular rates and rhythm, normal S1 S2, no S3 or S4, no murmur, click or rub, no peripheral edema and peripheral pulses strong  ABDOMEN: soft, nontender, no hepatosplenomegaly, no masses and bowel sounds normal  MS: no musculoskeletal defects are noted and gait is age appropriate without ataxia  SKIN: good turgor, no rash/jaundice/ecchymosis  NEURO: Normal strength and tone, sensory exam grossly normal, mentation intact and speech normal    Recent Labs   Lab Test 22  1111 21  1116    137   POTASSIUM 4.6 4.4   CR 1.31* 1.62*   A1C 6.2* 6.3*        Diagnostics:  No labs were ordered during this visit.   No EKG this visit, completed in the last 90 days.    Issa Prajapati MD  [phone] 2022 Routine   Issa Prajapati MD  [phone] 2022      Result Text        The nuclear stress test is abnormal. There is a small area of mild ischemia in the apical segment(s) of the left ventricle. The left ventricular ejection " fraction at rest is 62%.  The left ventricular ejection fraction at stress is 65%. Left ventricular function is normal.     There is no prior study for comparison.    Per cardiology consult on 5/9/2022:     1.  Preoperative clearance: Reviewed his recent stress test which either represents apical thinning with a small amount of ischemia and or artifact.  LV systolic function is preserved and his EKG is at baseline and his cardiac exam is normal.  Therefore we would recommend he proceed with his knee surgery without any additional testing needed.     Revised Cardiac Risk Index (RCRI):  The patient has the following serious cardiovascular risks for perioperative complications:   - Coronary Artery Disease (MI, positive stress test, angina, Qs on EKG) = 1 point     RCRI Interpretation: 1 point: Class II (low risk - 0.9% complication rate)           Signed Electronically by: Gilberto Alonzo MD  Copy of this evaluation report is provided to requesting physician.

## 2022-06-27 NOTE — PATIENT INSTRUCTIONS
You may take lisinopril and levothyroxine on morning of surgery if they are scheduled to be taken then. Take only with a small sip of water.  All other scheduled medication may be held on morning of surgery, and resumed when you are allowed to eat.      Stop aspirin 7 days before surgery.  .Do not take Ibuprofen or Naproxen from now until after procedure.  If you need to take something for pain, take Acetaminophen 325 mg orally 1-2 tabs every 4-6 hrs as needed for pain       Preparing for Your Surgery  Getting started  A nurse will call you to review your health history and instructions. They will give you an arrival time based on your scheduled surgery time. Please be ready to share:  Your doctor's clinic name and phone number  Your medical, surgical and anesthesia history  A list of allergies and sensitivities  A list of medicines, including herbal treatments and over-the-counter drugs  Whether the patient has a legal guardian (ask how to send us the papers in advance)  Please tell us if you're pregnant--or if there's any chance you might be pregnant. Some surgeries may injure a fetus (unborn baby), so they require a pregnancy test. Surgeries that are safe for a fetus don't always need a test, and you can choose whether to have one.   If you have a child who's having surgery, please ask for a copy of Preparing for Your Child's Surgery.    Preparing for surgery  Within 30 days of surgery: Have a pre-op exam (sometimes called an H&P, or History and Physical). This can be done at a clinic or pre-operative center.  If you're having a , you may not need this exam. Talk to your care team.  At your pre-op exam, talk to your care team about all medicines you take. If you need to stop any medicines before surgery, ask when to start taking them again.  We do this for your safety. Many medicines can make you bleed too much during surgery. Some change how well surgery (anesthesia) drugs work.  Call your insurance  company to let them know you're having surgery. (If you don't have insurance, call [phone].)  Call your clinic if there's any change in your health. This includes signs of a cold or flu (sore throat, runny nose, cough, rash, fever). It also includes a scrape or scratch near the surgery site.  If you have questions on the day of surgery, call your hospital or surgery center.  COVID testing  You may need to be tested for COVID-19 before having surgery. If so, we will give you instructions.  Eating and drinking guidelines  For your safety: Unless your surgeon tells you otherwise, follow the guidelines below.  Eat and drink as usual until 8 hours before surgery. After that, no food or milk.  Drink clear liquids until 2 hours before surgery. These are liquids you can see through, like water, Gatorade and Propel Water. You may also have black coffee and tea (no cream or milk).  Nothing by mouth within 2 hours of surgery. This includes gum, candy and breath mints.  If you drink alcohol: Stop drinking it the night before surgery.  If your care team tells you to take medicine on the morning of surgery, it's okay to take it with a sip of water.  Preventing infection  Shower or bathe the night before and morning of your surgery. Follow the instructions your clinic gave you. (If no instructions, use regular soap.)  Don't shave or clip hair near your surgery site. We'll remove the hair if needed.  Don't smoke or vape the morning of surgery. You may chew nicotine gum up to 2 hours before surgery. A nicotine patch is okay.  Note: Some surgeries require you to completely quit smoking and nicotine. Check with your surgeon.  Your care team will make every effort to keep you safe from infection. We will:  Clean our hands often with soap and water (or an alcohol-based hand rub).  Clean the skin at your surgery site with a special soap that kills germs.  Give you a special gown to keep you warm. (Cold raises the risk of  infection.)  Wear special hair covers, masks, gowns and gloves during surgery.  Give antibiotic medicine, if prescribed. Not all surgeries need antibiotics.  What to bring on the day of surgery  Photo ID and insurance card  Copy of your health care directive, if you have one  Glasses and hearing aides (bring cases)  You can't wear contacts during surgery  Inhaler and eye drops, if you use them (tell us about these when you arrive)  CPAP machine or breathing device, if you use them  A few personal items, if spending the night  If you have . . .  A pacemaker, ICD (cardiac defibrillator) or other implant: Bring the ID card.  An implanted stimulator: Bring the remote control.  A legal guardian: Bring a copy of the certified (court-stamped) guardianship papers.  Please remove any jewelry, including body piercings. Leave jewelry and other valuables at home.  If you're going home the day of surgery  You must have a responsible adult drive you home. They should stay with you overnight as well.  If you don't have someone to stay with you, and you aren't safe to go home alone, we may keep you overnight. Insurance often won't pay for this.  After surgery  If it's hard to control your pain or you need more pain medicine, please call your surgeon's office.  Questions?   If you have any questions for your care team, list them here: _________________________________________________________________________________________________________________________________________________________________________ ____________________________________ ____________________________________ ____________________________________  For informational purposes only. Not to replace the advice of your health care provider. Copyright   2003, 2019 [facility]. All rights reserved. Clinically reviewed by Halima Celestin MD. [employer]ART[employer] 170447 - REV 07/21.    Before Your Procedure or Hospital Admission  Testing for COVID-19  Thank you for choosing [facility] for your health care needs.The COVID-19 pandemic is a very challenging time for everyone.   Our goal is to keep you and our team here at [facility] safe and healthy. We've taken many steps to make this happen. For example:  We test and screen our staff, care teams and patients for COVID-19.  Everyone at [facility] must wear a mask and stay 6 feet apart.  We are limiting hospital and clinic visitors.  Before you come in  You must get tested for COVID-19, even if you've been vaccinated.  If you're going home on the same day as your procedure (surgery):  1 to 2 days before your procedure, take an at-home, rapid antigen test. You can buy these at many pharmacy stores. Or you can order free, at-home tests at [email or web address]/tests. If you can't find an at-home, rapid antigen test, please schedule a PCR test with [facility] by calling [employer], or visiting [email or web address]. We can't accept tests that are more than 4 days old.  If your test is negative, please take a photo of the test. Show the photo to the nurse when you come in for your procedure.  If your test is positive, please see the \"If your test shows you have COVID-19\" section on this page.  If you're staying overnight in the hospital:  4 days before your procedure, please get a   PCR test from a lab.  To schedule a PCR test with [facility], call [phone]. Or, visit   [email or web address].  If your test is negative, please ask the testing location to fax your results to us at [phone].  If your test is positive, please see the \"If your test shows you have COVID-19\" section below.  After your test and before your procedure, please follow these safetyguidelines:  Limit trips outside your home.  Limit the number of people you see.  Always wear a face covering outside your home.  Use social distancing. Stay 6 feet away from others whenever you can.  Wash your hands often.  If " your test shows you have COVID-19  If your test is positive, please tell your surgeon's office right away. A positive test means that you have COVID-19.  We'll probably have to postpone your admission, surgery or procedure. Your care team will discuss this with you. After that, we'll let you know what to do and when you can re-schedule.  If your test shows you DON'T have COVID-19  Even if your test is negative, you can still get COVID-19. It's rare, but sometimes the test result is wrong. You could also catch the virus after taking the test.   There's a very small chance that you could catch COVID-19 in the hospital or surgery center. [facility] has taken many steps to prevent this from happening.   Possible surgery delay   Like you, we want your surgery to happen when it's scheduled. But sometimes the hospital is so full that it's not safe for you to have your surgery. This is especially true during the pandemic. Your surgery may need to be re-scheduled at a later date. If thishappens, we will call and tell you.   Day of your surgery or procedure  Please come wearing a face covering that covers both your nose and mouth.  When you arrive, we'll ask you some questions to find out if you've had any exposures to COVID-19, or have any signs of COVID-19.  No matter where you took a test, we'll need to have the results. Please ask your testing location to fax the results to us at [phone]. If you took a rapid antigen at-home test, you can bring a photo of the results with you to your procedure.  Ask your care team if you can have visitors. All visitors must wear face coverings and will be screened for exposure to, or signs of, COVID-19.  The rules for visitors change often, depending on how much the virus is spreading. To learn more, see Visiting a Loved One in the Hospital during the COVID-19 Outbreak.  Please call your care team, hospital or surgery center if you have any questions. We thank you for your  understanding and for choosing [facility] for your care.   Questions and answers  Does it matter how I get tested for COVID-19?  Yes. If the plan is for you to go home on the same day as your procedure, you can take a rapid antigen, at-home test 1 to 2 days before you come in. If your test is negative, please take a photo of your test. Show it to the nurse when you come to the hospital. If you can't find a rapid antigen, at-home test, you can take a PCR test at a lab instead.  If the plan is for you to stay in the hospital after your surgery, you'll need to get a PCR test from a lab 4 days before your procedure. Ask the testing location to fax your results to [phone].  If we don't get your results, we may have to delay or cancel your treatment.  Do I need to get tested at [facility]?  No. However, if you need a PCR test from a lab, we recommend using an [facility] lab. We'll get the results more quickly and easily that way. To schedule a test, please call [phone]. Or, visit [email or web address].  For informational purposes only. Not to replace the advice of your health care provider. Copyright   2020 [facility] [employer]. All rights reserved. Clinically reviewed by Infection Prevention and the [facility] COVID-19 Clinical Team. [employer] [id number] - Rev 05/26/22.

## 2022-08-17 DIAGNOSIS — E03.9 HYPOTHYROIDISM, UNSPECIFIED TYPE: ICD-10-CM

## 2022-08-17 NOTE — TELEPHONE ENCOUNTER
"Requested Prescriptions   Pending Prescriptions Disp Refills    levothyroxine (SYNTHROID/LEVOTHROID) 137 MCG tablet [Pharmacy Med Name: LEVOTHYROXINE 0.137MG (137MCG) TAB] 90 tablet 0     Sig: TAKE 1 TABLET BY MOUTH DAILY        Thyroid Protocol Failed - 8/17/2022  2:52 PM        Failed - Normal TSH on file in past 12 months     No lab results found.             Passed - Patient is 12 years or older        Passed - Recent (12 mo) or future (30 days) visit within the authorizing provider's specialty     Patient has had an office visit with the authorizing provider or a provider within the authorizing providers department within the previous 12 mos or has a future within next 30 days. See \"Patient Info\" tab in inbasket, or \"Choose Columns\" in Meds & Orders section of the refill encounter.              Passed - Medication is active on med list              "

## 2022-08-19 RX ORDER — LEVOTHYROXINE SODIUM 137 UG/1
TABLET ORAL
Qty: 90 TABLET | Refills: 0 | Status: SHIPPED | OUTPATIENT
Start: 2022-08-19 | End: 2022-11-08

## 2022-09-01 ENCOUNTER — TRANSFERRED RECORDS (OUTPATIENT)
Dept: HEALTH INFORMATION MANAGEMENT | Facility: CLINIC | Age: 77
End: 2022-09-01

## 2022-09-18 ENCOUNTER — HEALTH MAINTENANCE LETTER (OUTPATIENT)
Age: 77
End: 2022-09-18

## 2022-10-29 ENCOUNTER — MYC MEDICAL ADVICE (OUTPATIENT)
Dept: FAMILY MEDICINE | Facility: CLINIC | Age: 77
End: 2022-10-29

## 2022-10-29 DIAGNOSIS — I10 BENIGN ESSENTIAL HYPERTENSION: ICD-10-CM

## 2022-10-31 RX ORDER — LISINOPRIL 20 MG/1
20 TABLET ORAL DAILY
Qty: 90 TABLET | Refills: 3 | Status: SHIPPED | OUTPATIENT
Start: 2022-10-31 | End: 2022-12-08

## 2022-10-31 RX ORDER — ATENOLOL 25 MG/1
12.5 TABLET ORAL DAILY
Qty: 15 TABLET | Refills: 1 | Status: SHIPPED | OUTPATIENT
Start: 2022-10-31 | End: 2023-01-26

## 2022-10-31 NOTE — TELEPHONE ENCOUNTER
"Requested Prescriptions   Pending Prescriptions Disp Refills     lisinopril (ZESTRIL) 20 MG tablet       Sig: Take 1 tablet (20 mg) by mouth Taking 1/2 tablet per day       ACE Inhibitors (Including Combos) Protocol Failed - 10/31/2022  3:33 PM        Failed - Normal serum creatinine on file in past 12 months     Recent Labs   Lab Test 04/18/22  1111   CR 1.31*       Ok to refill medication if creatinine is low          Passed - Blood pressure under 140/90 in past 12 months     BP Readings from Last 3 Encounters:   06/27/22 124/76   05/09/22 (!) 144/69   04/18/22 120/68                 Passed - Recent (12 mo) or future (30 days) visit within the authorizing provider's specialty     Patient has had an office visit with the authorizing provider or a provider within the authorizing providers department within the previous 12 mos or has a future within next 30 days. See \"Patient Info\" tab in inbasket, or \"Choose Columns\" in Meds & Orders section of the refill encounter.              Passed - Medication is active on med list        Passed - Patient is age 18 or older        Passed - Normal serum potassium on file in past 12 months     Recent Labs   Lab Test 04/18/22  1111   POTASSIUM 4.6                  Last Written Prescription Date:  10/22/22  Last Fill Quantity: 0,  # refills: 0   Last office visit: 6/27/2022 with prescribing provider:     Future Office Visit:                    "

## 2022-11-03 RX ORDER — BISACODYL 5 MG
TABLET, DELAYED RELEASE (ENTERIC COATED) ORAL
Qty: 4 TABLET | Refills: 0 | Status: SHIPPED | OUTPATIENT
Start: 2022-11-03 | End: 2022-12-08

## 2022-11-04 ENCOUNTER — ANESTHESIA EVENT (OUTPATIENT)
Dept: GASTROENTEROLOGY | Facility: CLINIC | Age: 77
End: 2022-11-04
Payer: COMMERCIAL

## 2022-11-04 ASSESSMENT — LIFESTYLE VARIABLES: TOBACCO_USE: 1

## 2022-11-04 NOTE — ANESTHESIA PREPROCEDURE EVALUATION
Anesthesia Pre-Procedure Evaluation    Patient: Anil Gutierres Jr.   MRN: [id number] : 1945        Procedure : Procedure(s):  COLONOSCOPY          Past Medical History:   Diagnosis Date     Arthritis 2018    L knee, R hip     Basal cell carcinoma      Coronary artery disease involving native heart without angina pectoris, unspecified vessel or lesion type 2022     Diabetes (H)     Type 2     Hypertension      Thyroid disease     Hypothyroidism      Past Surgical History:   Procedure Laterality Date     BIOPSY      Tongue     COLONOSCOPY  2017     EYE SURGERY  2019    cataracts     GLOSSECTOMY PARTIAL Right 2021    Procedure: GLOSSECTOMY, PARTIAL;  Surgeon: Rojelio Horton MD;  Location: [facility] OR     ORTHOPEDIC SURGERY  dec  23, 2020    R Hip      Allergies   Allergen Reactions     Amoxicillin GI Disturbance     N, V,  D     when     took   for   dental prophylaxis      Seasonal Allergies      Simvastatin Muscle Pain (Myalgia)      Social History     Tobacco Use     Smoking status: Former     Packs/day: 1.00     Years: 20.00     Pack years: 20.00     Types: Cigarettes     Start date: 1964     Quit date: 1984     Years since quittin.8     Smokeless tobacco: Never   Substance Use Topics     Alcohol use: Yes     Comment: one beer or mixed drink a day in warmer weather      Wt Readings from Last 1 Encounters:   22 111.9 kg (246 lb 9.6 oz)        Anesthesia Evaluation            ROS/MED HX  ENT/Pulmonary:     (+) tobacco use, Past use,     Neurologic:       Cardiovascular:     (+) hypertension--CAD ---Previous cardiac testing   Echo: Date: Results:    Stress Test: Date: 22 Results:  The nuclear stress test is abnormal. There is a small area of mild ischemia in the apical segment(s) of the left ventricle. The left ventricular ejection fraction at rest is 62%. The left ventricular ejection fraction at stress is 65%. Left ventricular function is normal.    There is  no prior study for comparison.  ECG Reviewed: Date: Results:    Cath: Date: Results:      METS/Exercise Tolerance:     Hematologic:       Musculoskeletal:   (+) arthritis,     GI/Hepatic:       Renal/Genitourinary:     (+) renal disease, type: CRI,     Endo:     (+) thyroid problem,     Psychiatric/Substance Use:       Infectious Disease:       Malignancy:   (+) Malignancy, History of Skin.    Other:            Physical Exam    Airway        Mallampati: II   TM distance: > 3 FB   Neck ROM: full   Mouth opening: > 3 cm    Respiratory Devices and Support  Comment: Not on oxygen currently       Dental       (+) chipped    B=Bridge, C=Chipped, L=Loose, M=Missing    Cardiovascular   cardiovascular exam normal          Pulmonary   pulmonary exam normal                OUTSIDE LABS:  CBC:   Lab Results   Component Value Date    WBC 8.8 11/06/2018    HGB 14.4 11/06/2018    HCT 43.3 11/06/2018     11/06/2018     BMP:   Lab Results   Component Value Date     04/18/2022     11/29/2021    POTASSIUM 4.6 04/18/2022    POTASSIUM 4.4 11/29/2021    CHLORIDE 103 04/18/2022    CHLORIDE 106 11/29/2021    CO2 28 04/18/2022    CO2 24 11/29/2021    BUN 26 04/18/2022    BUN 34 (H) 11/29/2021    CR 1.31 (H) 04/18/2022    CR 1.62 (H) 11/29/2021     (H) 04/18/2022     (H) 12/02/2021     COAGS: No results found for: PTT, INR, FIBR  POC: No results found for: BGM, HCG, HCGS  HEPATIC: No results found for: ALBUMIN, PROTTOTAL, ALT, AST, GGT, ALKPHOS, BILITOTAL, BILIDIRECT, MORGAN  OTHER:   Lab Results   Component Value Date    A1C 6.2 (H) 04/18/2022    MILKA 9.5 04/18/2022       Anesthesia Plan    ASA Status:  3      Anesthesia Type: General.   Induction: Intravenous, Propofol.   Maintenance: TIVA.        Consents    Anesthesia Plan(s) and associated risks, benefits, and realistic alternatives discussed. Questions answered and patient/representative(s) expressed understanding.     - Discussed: Risks, Benefits and  Alternatives for BOTH SEDATION and the PROCEDURE were discussed     - Discussed with:  Patient         Postoperative Care    Pain management: IV analgesics, Oral pain medications, Multi-modal analgesia.   PONV prophylaxis: Ondansetron (or other 5HT-3), Dexamethasone or Solumedrol     Comments:    Other Comments: Patient aware of plan, what to expect, and potential risks. Timeout was performed before bringing the patient back to OR, and once again, patient was asked if they had any questions            Cm Rodriguez CRNA, APRN CRNA

## 2022-11-07 ENCOUNTER — ANESTHESIA (OUTPATIENT)
Dept: GASTROENTEROLOGY | Facility: CLINIC | Age: 77
End: 2022-11-07
Payer: COMMERCIAL

## 2022-11-07 ENCOUNTER — HOSPITAL ENCOUNTER (OUTPATIENT)
Facility: CLINIC | Age: 77
Discharge: HOME | End: 2022-11-07
Attending: SURGERY | Admitting: SURGERY
Payer: COMMERCIAL

## 2022-11-07 VITALS
BODY MASS INDEX: 35 KG/M2 | WEIGHT: 250 LBS | HEIGHT: 71 IN | RESPIRATION RATE: 16 BRPM | SYSTOLIC BLOOD PRESSURE: 154 MMHG | HEART RATE: 69 BPM | OXYGEN SATURATION: 100 % | DIASTOLIC BLOOD PRESSURE: 92 MMHG | TEMPERATURE: 97 F

## 2022-11-07 DIAGNOSIS — E11.9 CONTROLLED TYPE 2 DIABETES MELLITUS WITHOUT COMPLICATION, WITHOUT LONG-TERM CURRENT USE OF INSULIN (H): ICD-10-CM

## 2022-11-07 DIAGNOSIS — Z12.11 SPECIAL SCREENING FOR MALIGNANT NEOPLASMS, COLON: Primary | ICD-10-CM

## 2022-11-07 DIAGNOSIS — N18.31 STAGE 3A CHRONIC KIDNEY DISEASE (H): Primary | ICD-10-CM

## 2022-11-07 DIAGNOSIS — E03.9 HYPOTHYROIDISM, UNSPECIFIED TYPE: ICD-10-CM

## 2022-11-07 LAB — COLONOSCOPY: NORMAL

## 2022-11-07 PROCEDURE — 370N000017 HC ANESTHESIA TECHNICAL FEE, PER MIN: Performed by: SURGERY

## 2022-11-07 PROCEDURE — G0121 COLON CA SCRN NOT HI RSK IND: HCPCS | Performed by: SURGERY

## 2022-11-07 PROCEDURE — 250N000009 HC RX 250: Performed by: SURGERY

## 2022-11-07 PROCEDURE — 258N000003 HC RX IP 258 OP 636: Performed by: SURGERY

## 2022-11-07 PROCEDURE — 45378 DIAGNOSTIC COLONOSCOPY: CPT | Performed by: SURGERY

## 2022-11-07 PROCEDURE — 250N000011 HC RX IP 250 OP 636

## 2022-11-07 PROCEDURE — G0105 COLORECTAL SCRN; HI RISK IND: HCPCS | Performed by: SURGERY

## 2022-11-07 RX ORDER — ONDANSETRON 4 MG/1
4 TABLET, ORALLY DISINTEGRATING ORAL EVERY 30 MIN PRN
Status: DISCONTINUED | OUTPATIENT
Start: 2022-11-07 | End: 2022-11-07 | Stop reason: HOSPADM

## 2022-11-07 RX ORDER — LIDOCAINE 40 MG/G
CREAM TOPICAL
Status: DISCONTINUED | OUTPATIENT
Start: 2022-11-07 | End: 2022-11-07 | Stop reason: HOSPADM

## 2022-11-07 RX ORDER — PROPOFOL 10 MG/ML
INJECTION, EMULSION INTRAVENOUS CONTINUOUS PRN
Status: DISCONTINUED | OUTPATIENT
Start: 2022-11-07 | End: 2022-11-07

## 2022-11-07 RX ORDER — SODIUM CHLORIDE, SODIUM LACTATE, POTASSIUM CHLORIDE, CALCIUM CHLORIDE 600; 310; 30; 20 MG/100ML; MG/100ML; MG/100ML; MG/100ML
INJECTION, SOLUTION INTRAVENOUS CONTINUOUS
Status: DISCONTINUED | OUTPATIENT
Start: 2022-11-07 | End: 2022-11-07 | Stop reason: HOSPADM

## 2022-11-07 RX ORDER — ONDANSETRON 2 MG/ML
4 INJECTION INTRAMUSCULAR; INTRAVENOUS EVERY 30 MIN PRN
Status: DISCONTINUED | OUTPATIENT
Start: 2022-11-07 | End: 2022-11-07 | Stop reason: HOSPADM

## 2022-11-07 RX ORDER — PROPOFOL 10 MG/ML
INJECTION, EMULSION INTRAVENOUS PRN
Status: DISCONTINUED | OUTPATIENT
Start: 2022-11-07 | End: 2022-11-07

## 2022-11-07 RX ADMIN — LIDOCAINE HYDROCHLORIDE 0.1 ML: 10 INJECTION, SOLUTION EPIDURAL; INFILTRATION; INTRACAUDAL; PERINEURAL at 11:00

## 2022-11-07 RX ADMIN — PROPOFOL 70 MG: 10 INJECTION, EMULSION INTRAVENOUS at 11:53

## 2022-11-07 RX ADMIN — PROPOFOL 150 MCG/KG/MIN: 10 INJECTION, EMULSION INTRAVENOUS at 11:54

## 2022-11-07 RX ADMIN — PROPOFOL 150 MCG/KG/MIN: 10 INJECTION, EMULSION INTRAVENOUS at 11:56

## 2022-11-07 RX ADMIN — SODIUM CHLORIDE, POTASSIUM CHLORIDE, SODIUM LACTATE AND CALCIUM CHLORIDE: 600; 310; 30; 20 INJECTION, SOLUTION INTRAVENOUS at 11:00

## 2022-11-07 ASSESSMENT — ACTIVITIES OF DAILY LIVING (ADL): ADLS_ACUITY_SCORE: 35

## 2022-11-07 NOTE — ANESTHESIA POSTPROCEDURE EVALUATION
Patient: Anil Gutierres Jr.    Procedure: Procedure(s):  COLONOSCOPY       Anesthesia Type:  General    Note:  Disposition: Outpatient   Postop Pain Control: Uneventful            Sign Out: Well controlled pain   PONV: No   Neuro/Psych: Uneventful            Sign Out: Acceptable/Baseline neuro status   Airway/Respiratory: Uneventful            Sign Out: Acceptable/Baseline resp. status   CV/Hemodynamics: Uneventful            Sign Out: Acceptable CV status; No obvious hypovolemia; No obvious fluid overload   Other NRE: NONE   DID A NON-ROUTINE EVENT OCCUR? No           Last vitals:  Vitals Value Taken Time   /88 11/07/22 1245   Temp 36.1  C (97  F) 11/07/22 1215   Pulse 61 11/07/22 1245   Resp 16 11/07/22 1215   SpO2 100 % 11/07/22 1232   Vitals shown include unvalidated device data.    Electronically Signed By: ROBLES Wells CRNA  November 7, 2022  3:28 PM

## 2022-11-07 NOTE — H&P
Former[facility]    Pre-Endoscopy History and Physical     Anil Gutierres Jr. MRN# [id number]   YOB: 1945 Age: 76 year old     Date of Procedure: 2022  Primary care provider: Gilberto Alonzo  Type of Endoscopy: Colonoscopy with possible biopsy, possible polypectomy  Reason for Procedure: screening  Type of Anesthesia Anticipated: MAC    HPI:    Anil is a 76 year old male who will be undergoing the above procedure.  1st colon; no blood thinner; no famhx colon ca    A history and physical has been performed. The patient's medications and allergies have been reviewed. The risks and benefits of the procedure and the sedation options and risks were discussed with the patient.  All questions were answered and informed consent was obtained.      He denies a personal or family history of anesthesia complications or bleeding disorders.     Patient Active Problem List   Diagnosis     Stage 3a chronic kidney disease (H)     Coronary artery disease involving native heart without angina pectoris, unspecified vessel or lesion type        Past Medical History:   Diagnosis Date     Arthritis     L knee, R hip     Basal cell carcinoma      Coronary artery disease involving native heart without angina pectoris, unspecified vessel or lesion type 2022     Diabetes (H)     Type 2     Hypertension      Thyroid disease     Hypothyroidism        Past Surgical History:   Procedure Laterality Date     BIOPSY      Tongue     COLONOSCOPY  2017     EYE SURGERY  2019    cataracts     GLOSSECTOMY PARTIAL Right 2021    Procedure: GLOSSECTOMY, PARTIAL;  Surgeon: Rojelio Horton MD;  Location: [facility] OR     ORTHOPEDIC SURGERY  dec  23, 2020    R Hip       Social History     Tobacco Use     Smoking status: Former     Packs/day: 1.00     Years: 20.00     Pack years: 20.00     Types: Cigarettes     Start date: 1964     Quit date: 1984     Years since quittin.8      Smokeless tobacco: Never   Substance Use Topics     Alcohol use: Yes     Comment: one beer or mixed drink a day in warmer weather       Family History   Problem Relation Age of Onset     Arthritis Mother      Cerebrovascular Disease Father      Diabetes Sister        Prior to Admission medications    Medication Sig Start Date End Date Taking? Authorizing Provider   aspirin 81 MG EC tablet Take 81 mg by mouth daily   Yes Reported, Patient   atenolol (TENORMIN) 25 MG tablet Take 0.5 tablets (12.5 mg) by mouth daily 10/31/22  Yes Gilberto Alonzo MD   bisacodyl (DULCOLAX) 5 MG EC tablet Take 2 tablets at 3 pm the day before your procedure. If your procedure is before 11 am, take 2 additional tablets at 11 pm. If your procedure is after 11 am, take 2 additional tablets at 6 am. For additional instructions refer to your colonoscopy prep instructions. 11/3/22  Yes Greer White MD   furosemide (LASIX) 20 MG tablet Take 20 mg by mouth as needed 9/16/20 11/3/22 Yes Reported, Patient   levothyroxine (SYNTHROID/LEVOTHROID) 137 MCG tablet TAKE 1 TABLET BY MOUTH DAILY 8/19/22  Yes Alondra Burrows APRN CNP   lisinopril (ZESTRIL) 20 MG tablet Take 1 tablet (20 mg) by mouth daily Taking 1/2 tablet per day 10/31/22  Yes Gilberto Alonzo MD   loratadine (CLARITIN) 10 MG tablet Take 10 mg by mouth   Yes Reported, Patient   polyethylene glycol (GOLYTELY) 236 g suspension The night before the exam at 6 pm drink an 8-ounce glass every 15 minutes until the jug is half empty. If you arrive before 11 AM: Drink the other half of the Golytely jug at 11 PM night before procedure. If you arrive after 11 AM: Drink the other half of the Golytely jug at 6 AM day of procedure. For additional instructions refer to your colonoscopy prep instructions. 11/3/22  Yes Greer White MD   Glucose Blood (BLOOD GLUCOSE TEST STRIPS) STRP by In Vitro route daily    Reported, Patient       Allergies   Allergen Reactions      "Amoxicillin GI Disturbance     N, V,  D   6/21  when     took   for   dental prophylaxis      Seasonal Allergies      Simvastatin Muscle Pain (Myalgia)        REVIEW OF SYSTEMS:   5 point ROS negative except as noted above in HPI, including Gen., Resp., CV, GI &  system review.    PHYSICAL EXAM:   There were no vitals taken for this visit. Estimated body mass index is 35.64 kg/m  as calculated from the following:    Height as of 6/27/22: 1.772 m (5' 9.75\").    Weight as of 6/27/22: 111.9 kg (246 lb 9.6 oz).   Constitutional: Awake, alert, no acute distress.  Eyes: No scleral icterus.  Conjunctiva are without injection.  ENMT: Mucous membranes moist, dentition and gums are intact.   Neck: Soft, supple, trachea midline.    Endocrine: n/a   Lymphatic: There is no cervical, submandibularadenopathy.  Respiratory: normal effortgs   Cardiovascular: S1, S2  Abdomen: Non-distended, non-tender,  No masses,  Musculoskeletal: No spinal or CVA tenderness. Full range of motion in the upper and lower extremities.    Skin: No skin rashes or lesions to inspection.  No petechia.    Neurologic: alerted and oriented 3x  Psychiatric: The patient's affect is not blunted and mood is appropriate.  DIAGNOSTICS:    Not indicated    IMPRESSION   ASA Class 3 - Severe systemic disease, but not incapacitating    PLAN:   Plan for Colonoscopy with possible biopsy, possible polypectomy. We discussed the risks, benefits and alternatives and the patient wished to proceed.  Patient is cleared for the above procedure.    The above has been forwarded to the consulting provider.    [facility]        "

## 2022-11-07 NOTE — ANESTHESIA CARE TRANSFER NOTE
Patient: Anil Gutierres Jr.    Procedure: Procedure(s):  COLONOSCOPY       Diagnosis: Special screening for malignant neoplasm of colon [Z12.11]  Diagnosis Additional Information: No value filed.    Anesthesia Type:   General     Note:    Oropharynx: oropharynx clear of all foreign objects and spontaneously breathing  Level of Consciousness: drowsy  Oxygen Supplementation: nasal cannula  Level of Supplemental Oxygen (L/min / FiO2): 4  Independent Airway: airway patency satisfactory and stable  Dentition: dentition unchanged  Vital Signs Stable: post-procedure vital signs reviewed and stable  Report to RN Given: handoff report given  Patient transferred to: PACU    Handoff Report: Identifed the Patient, Identified the Reponsible Provider, Reviewed the pertinent medical history, Discussed the surgical course, Reviewed Intra-OP anesthesia mangement and issues during anesthesia, Set expectations for post-procedure period and Allowed opportunity for questions and acknowledgement of understanding      Vitals:  Vitals Value Taken Time   /75 11/07/22 1215   Temp 36.1  C (97  F) 11/07/22 1215   Pulse 60 11/07/22 1215   Resp 16 11/07/22 1215   SpO2 100 % 11/07/22 1231   Vitals shown include unvalidated device data.    Electronically Signed By: ROBLES Coronel CRNA  November 7, 2022  12:32 PM

## 2022-11-08 RX ORDER — LEVOTHYROXINE SODIUM 137 UG/1
137 TABLET ORAL DAILY
Qty: 90 TABLET | Refills: 0 | Status: SHIPPED | OUTPATIENT
Start: 2022-11-08 | End: 2022-12-08

## 2022-11-08 NOTE — TELEPHONE ENCOUNTER
"Requested Prescriptions   Pending Prescriptions Disp Refills    levothyroxine (SYNTHROID/LEVOTHROID) 137 MCG tablet [Pharmacy Med Name: LEVOTHYROXINE 0.137MG (137MCG) TAB] 90 tablet 0     Sig: TAKE 1 TABLET BY MOUTH DAILY       Thyroid Protocol Failed - 11/7/2022 12:39 PM        Failed - Normal TSH on file in past 12 months     No lab results found.           Passed - Patient is 12 years or older        Passed - Recent (12 mo) or future (30 days) visit within the authorizing provider's specialty     Patient has had an office visit with the authorizing provider or a provider within the authorizing providers department within the previous 12 mos or has a future within next 30 days. See \"Patient Info\" tab in inbasket, or \"Choose Columns\" in Meds & Orders section of the refill encounter.              Passed - Medication is active on med list             "

## 2022-11-08 NOTE — TELEPHONE ENCOUNTER
Please call patient to assist in scheduling lab appointment first, then in-clinic visit for a wellness exam and medication management.

## 2022-11-09 NOTE — TELEPHONE ENCOUNTER
XENA with wife to have patient call Dr. Alonzo's Care Team. Fay Kingston on 11/9/2022 at 10:23 AM

## 2022-11-25 ENCOUNTER — APPOINTMENT (OUTPATIENT)
Dept: CT IMAGING | Facility: CLINIC | Age: 77
End: 2022-11-25
Attending: FAMILY MEDICINE
Payer: COMMERCIAL

## 2022-11-25 ENCOUNTER — HOSPITAL ENCOUNTER (EMERGENCY)
Facility: CLINIC | Age: 77
Discharge: HOME | End: 2022-11-25
Attending: FAMILY MEDICINE | Admitting: FAMILY MEDICINE
Payer: COMMERCIAL

## 2022-11-25 VITALS
HEART RATE: 45 BPM | OXYGEN SATURATION: 99 % | BODY MASS INDEX: 34.3 KG/M2 | HEIGHT: 71 IN | TEMPERATURE: 97.6 F | RESPIRATION RATE: 28 BRPM | SYSTOLIC BLOOD PRESSURE: 167 MMHG | DIASTOLIC BLOOD PRESSURE: 85 MMHG | WEIGHT: 245 LBS

## 2022-11-25 DIAGNOSIS — G51.0 CRANIAL NERVE VII PALSY: ICD-10-CM

## 2022-11-25 DIAGNOSIS — I10 BENIGN ESSENTIAL HYPERTENSION: ICD-10-CM

## 2022-11-25 DIAGNOSIS — G51.0 BELL PALSY: ICD-10-CM

## 2022-11-25 LAB
ANION GAP SERPL CALCULATED.3IONS-SCNC: 6 MMOL/L (ref 7–15)
BASOPHILS # BLD AUTO: 0.1 10E3/UL (ref 0–0.2)
BASOPHILS NFR BLD AUTO: 1 %
BUN SERPL-MCNC: 25.3 MG/DL (ref 8–23)
CALCIUM SERPL-MCNC: 9.8 MG/DL (ref 8.8–10.2)
CHLORIDE SERPL-SCNC: 105 MMOL/L (ref 98–107)
CREAT SERPL-MCNC: 1.41 MG/DL (ref 0.67–1.17)
DEPRECATED HCO3 PLAS-SCNC: 28 MMOL/L (ref 22–29)
EOSINOPHIL # BLD AUTO: 0.5 10E3/UL (ref 0–0.7)
EOSINOPHIL NFR BLD AUTO: 8 %
ERYTHROCYTE [DISTWIDTH] IN BLOOD BY AUTOMATED COUNT: 13 % (ref 10–15)
GFR SERPL CREATININE-BSD FRML MDRD: 52 ML/MIN/1.73M2
GLUCOSE BLDC GLUCOMTR-MCNC: 137 MG/DL (ref 70–99)
GLUCOSE SERPL-MCNC: 147 MG/DL (ref 70–99)
HCT VFR BLD AUTO: 44 % (ref 40–53)
HGB BLD-MCNC: 14.3 G/DL (ref 13.3–17.7)
HOLD SPECIMEN: NORMAL
IMM GRANULOCYTES # BLD: 0 10E3/UL
IMM GRANULOCYTES NFR BLD: 0 %
LYMPHOCYTES # BLD AUTO: 1 10E3/UL (ref 0.8–5.3)
LYMPHOCYTES NFR BLD AUTO: 17 %
MCH RBC QN AUTO: 29.3 PG (ref 26.5–33)
MCHC RBC AUTO-ENTMCNC: 32.5 G/DL (ref 31.5–36.5)
MCV RBC AUTO: 90 FL (ref 78–100)
MONOCYTES # BLD AUTO: 0.6 10E3/UL (ref 0–1.3)
MONOCYTES NFR BLD AUTO: 9 %
NEUTROPHILS # BLD AUTO: 3.9 10E3/UL (ref 1.6–8.3)
NEUTROPHILS NFR BLD AUTO: 65 %
NRBC # BLD AUTO: 0 10E3/UL
NRBC BLD AUTO-RTO: 0 /100
PLATELET # BLD AUTO: 226 10E3/UL (ref 150–450)
POTASSIUM SERPL-SCNC: 4.4 MMOL/L (ref 3.4–5.3)
RBC # BLD AUTO: 4.88 10E6/UL (ref 4.4–5.9)
SODIUM SERPL-SCNC: 139 MMOL/L (ref 136–145)
WBC # BLD AUTO: 6.1 10E3/UL (ref 4–11)

## 2022-11-25 PROCEDURE — 250N000011 HC RX IP 250 OP 636: Performed by: FAMILY MEDICINE

## 2022-11-25 PROCEDURE — 99285 EMERGENCY DEPT VISIT HI MDM: CPT | Mod: 25 | Performed by: FAMILY MEDICINE

## 2022-11-25 PROCEDURE — 93005 ELECTROCARDIOGRAM TRACING: CPT | Performed by: FAMILY MEDICINE

## 2022-11-25 PROCEDURE — 70450 CT HEAD/BRAIN W/O DYE: CPT

## 2022-11-25 PROCEDURE — 80048 BASIC METABOLIC PNL TOTAL CA: CPT | Performed by: FAMILY MEDICINE

## 2022-11-25 PROCEDURE — 85025 COMPLETE CBC W/AUTO DIFF WBC: CPT | Performed by: FAMILY MEDICINE

## 2022-11-25 PROCEDURE — 93010 ELECTROCARDIOGRAM REPORT: CPT | Performed by: FAMILY MEDICINE

## 2022-11-25 PROCEDURE — 99284 EMERGENCY DEPT VISIT MOD MDM: CPT | Mod: 25 | Performed by: FAMILY MEDICINE

## 2022-11-25 PROCEDURE — 36415 COLL VENOUS BLD VENIPUNCTURE: CPT | Performed by: FAMILY MEDICINE

## 2022-11-25 PROCEDURE — 250N000009 HC RX 250: Performed by: FAMILY MEDICINE

## 2022-11-25 PROCEDURE — 70496 CT ANGIOGRAPHY HEAD: CPT

## 2022-11-25 RX ORDER — PREDNISONE 10 MG/1
30 TABLET ORAL 2 TIMES DAILY
Qty: 42 TABLET | Refills: 0 | Status: SHIPPED | OUTPATIENT
Start: 2022-11-25 | End: 2022-12-02

## 2022-11-25 RX ORDER — IOPAMIDOL 755 MG/ML
68 INJECTION, SOLUTION INTRAVASCULAR ONCE
Status: COMPLETED | OUTPATIENT
Start: 2022-11-25 | End: 2022-11-25

## 2022-11-25 RX ORDER — OXYCODONE HYDROCHLORIDE 5 MG/1
5-10 TABLET ORAL EVERY 4 HOURS PRN
COMMUNITY
Start: 2022-07-13 | End: 2022-11-25

## 2022-11-25 RX ORDER — LISINOPRIL 20 MG/1
20 TABLET ORAL DAILY
Qty: 30 TABLET | Refills: 1 | Status: SHIPPED | OUTPATIENT
Start: 2022-11-25 | End: 2022-12-08

## 2022-11-25 RX ORDER — CETIRIZINE HYDROCHLORIDE 10 MG/1
10 TABLET ORAL EVERY EVENING
COMMUNITY

## 2022-11-25 RX ADMIN — SODIUM CHLORIDE 100 ML: 9 INJECTION, SOLUTION INTRAVENOUS at 09:10

## 2022-11-25 RX ADMIN — IOPAMIDOL 68 ML: 755 INJECTION, SOLUTION INTRAVENOUS at 09:10

## 2022-11-25 ASSESSMENT — ACTIVITIES OF DAILY LIVING (ADL)
ADLS_ACUITY_SCORE: 35
ADLS_ACUITY_SCORE: 35

## 2022-11-25 ASSESSMENT — VISUAL ACUITY: OU: BASELINE

## 2022-11-25 NOTE — ED TRIAGE NOTES
Pt here with right sided facial droop since Wednesday and headache since Tuesday. Some slurred speech also noted. Pt said he didn't go to the doctor because it was Thanksgiving. Pt is hypertensive 244/99 states he has not refilled his lisinopril for two months. Pt's headache is behind his right eye today.      Triage Assessment     Row Name 11/25/22 0816       Triage Assessment (Adult)    Airway WDL WDL       Respiratory WDL    Respiratory WDL WDL       Cardiac WDL    Cardiac WDL WDL       Cognitive/Neuro/Behavioral WDL    Cognitive/Neuro/Behavioral WDL X;speech  right facial droop    Speech slurred       Pupils (CN II)    Pupil PERRLA yes    Pupil Size Left 2 mm    Pupil Size Right 2 mm       [place] Coma Scale    Best Eye Response 4-->(E4) spontaneous    Best Motor Response 6-->(M6) obeys commands    Best Verbal Response 5-->(V5) oriented    [place] Coma Scale Score 15

## 2022-11-25 NOTE — DISCHARGE INSTRUCTIONS
Resume taking your lisinopril, 20 mg, once daily.  For the Bell's palsy, take prednisone, 10 mg, 3 tablets in the morning, and 3 tablets at lunchtime.  Do this for 1 week.  Use artificial tears during the day to keep your eye moist.  Apply Lacri-Lube ointment to the eye at night and tape your eye shut.  Schedule follow-up for recheck in primary care in 1 week.  I placed a referral order to neurology for follow-up with neurology.

## 2022-11-25 NOTE — ED PROVIDER NOTES
History     Chief Complaint   Patient presents with     Facial Droop     Right side since Wednesday     Headache     Since Tuesday     HPI  Anil Gutierres Jr. is a 76 year old male who comes in with his wife with a facial droop.  This began 2 days ago.  He noticed that the right side of his face was weak and he could not smile properly or drink coffee properly due to drooping of the right corner of his mouth and difficulty closing his eye.  He noticed this when he saw it in the mirror.  The day prior to that he had a mild headache which is now resolved.  He has not noticed other neurologic symptoms and has not had any difficulty with word finding.  His speech is a bit slurred due to the facial weakness but he has not had word finding difficulties.  He has not had weakness or numbness in the upper or lower extremities.  He has not had any clumsiness with his hands or difficulty walking.  He has not noticed any visual loss.  He has a history of hypertension and hypothyroidism and is on thyroid replacement.  He is transitioning to a new doctor after his last physician retired and has not been able to get his lisinopril refilled and so he has not taken it for a few weeks.  He is taking his atenolol aspirin levothyroxine.  He occasionally uses furosemide for lymphedema.  He denies symptoms of recent illness.  He has not had any cough, cold, fever, flu.  Allergies:  Allergies   Allergen Reactions     Amoxicillin GI Disturbance     N, V,  D   6/21  when     took   for   dental prophylaxis      Seasonal Allergies      Simvastatin Muscle Pain (Myalgia)       Problem List:    Patient Active Problem List    Diagnosis Date Noted     Coronary artery disease involving native heart without angina pectoris, unspecified vessel or lesion type 06/27/2022     Priority: Medium     Stage 3a chronic kidney disease (H) 04/18/2022     Priority: Medium        Past Medical History:    Past Medical History:   Diagnosis Date     Arthritis  2018     Basal cell carcinoma      Coronary artery disease involving native heart without angina pectoris, unspecified vessel or lesion type 2022     Diabetes (H) 2000     Hypertension      Thyroid disease        Past Surgical History:    Past Surgical History:   Procedure Laterality Date     BIOPSY      Tongue     COLONOSCOPY  2017     COLONOSCOPY N/A 2022    Procedure: COLONOSCOPY;  Surgeon: Greer White MD;  Location: [facility]     EYE SURGERY  2019    cataracts     GLOSSECTOMY PARTIAL Right 2021    Procedure: GLOSSECTOMY, PARTIAL;  Surgeon: Rojelio Horton MD;  Location: [facility]     ORTHOPEDIC SURGERY  dec  23, 2020    R Hip       Family History:    Family History   Problem Relation Age of Onset     Arthritis Mother      Cerebrovascular Disease Father      Diabetes Sister        Social History:  Marital Status:   [2]  Social History     Tobacco Use     Smoking status: Former     Packs/day: 1.00     Years: 20.00     Pack years: 20.00     Types: Cigarettes     Start date: 1964     Quit date: 1984     Years since quittin.9     Smokeless tobacco: Never   Vaping Use     Vaping Use: Never used   Substance Use Topics     Alcohol use: Yes     Comment: one beer or mixed drink a day in warmer weather     Drug use: Never        Medications:    aspirin (ASA) 81 MG EC tablet  atenolol (TENORMIN) 25 MG tablet  cetirizine (ZYRTEC) 10 MG tablet  furosemide (LASIX) 20 MG tablet  levothyroxine (SYNTHROID/LEVOTHROID) 137 MCG tablet  lisinopril (ZESTRIL) 20 MG tablet  lisinopril (ZESTRIL) 20 MG tablet  loratadine (CLARITIN) 10 MG tablet  predniSONE (DELTASONE) 10 MG tablet  bisacodyl (DULCOLAX) 5 MG EC tablet  Glucose Blood (BLOOD GLUCOSE TEST STRIPS) STRP  polyethylene glycol (GOLYTELY) 236 g suspension          Review of Systems  All other systems are reviewed and are negative    Physical Exam   BP: (!) 244/99  Pulse: 52  Temp: 97.6  F (36.4  C)  Resp: 18  Height: 180.3 cm (5'  "11\")  Weight: 111.1 kg (245 lb)  SpO2: 99 %      Physical Exam  Nursing note and vitals were reviewed.  Constitutional: Awake and alert, adequately nourished and developed appearing 76-year-old in no apparent discomfort, who does not appear acutely ill, and who answers questions appropriately and cooperates with examination.  HEENT: Right-sided 7th cranial nerve weakness is significant.  House-Brackman grade V dysfunction on the right. PERRL. EOMI.   Neck: Freely mobile.  Cardiovascular: Cardiac examination reveals normal heart rate and regular rhythm without murmur.  Pulmonary/Chest: Breathing is unlabored.  Breath sounds are clear and equal bilaterally.  There no retractions, tachypnea, rales, wheezes, or rhonchi.  Abdomen: Soft, nontender, no HSM or masses rebound or guarding.  Musculoskeletal: Extremities are warm and well-perfused and without edema  Neurological: Alert, oriented, thought content logical, coherent.  Speech is fluent.  Dense right 7th nerve palsy is present as above.  Remainder of cranial nerve testing is normal.  Motor strength is symmetric in the upper and lower extremities.  No pronator drift is present.  No limb ataxia is present.  Rapid alternating movements are well performed in both hands.  Heel-to-shin and finger-to-nose are well performed.  Motor tone is normal.  No tremors present.  Skin: Warm, dry, no rashes.  Psychiatric: Affect broad and appropriate.  ED Course                 Procedures              EKG Interpretation:      Interpreted by Chang Hough MD  Time reviewed: 8:47am  Symptoms at time of EKG: none   Rhythm: normal sinus   Rate: normal  Axis: normal  Ectopy: none  Conduction: Right bundle branch block  ST Segments/ T Waves: No ST-T wave changes  Q Waves: none  Comparison to prior: Unchanged from 4/19/22    Clinical Impression: Sinus rhythm with right bundle branch block without signs of acute ischemia unchanged from previous EKG          Critical Care time:  none      "          Results for orders placed or performed during the hospital encounter of 11/25/22 (from the past 24 hour(s))   Glucose by meter   Result Value Ref Range    GLUCOSE BY METER POCT 137 (H) 70 - 99 mg/dL   CBC with Platelets & Differential    Narrative    The following orders were created for panel order CBC with Platelets & Differential.  Procedure                               Abnormality         Status                     ---------                               -----------         ------                     CBC with platelets and d...[[id number]]                      Final result                 Please view results for these tests on the individual orders.   Basic metabolic panel   Result Value Ref Range    Sodium 139 136 - 145 mmol/L    Potassium 4.4 3.4 - 5.3 mmol/L    Chloride 105 98 - 107 mmol/L    Carbon Dioxide (CO2) 28 22 - 29 mmol/L    Anion Gap 6 (L) 7 - 15 mmol/L    Urea Nitrogen 25.3 (H) 8.0 - 23.0 mg/dL    Creatinine 1.41 (H) 0.67 - 1.17 mg/dL    Calcium 9.8 8.8 - 10.2 mg/dL    Glucose 147 (H) 70 - 99 mg/dL    GFR Estimate 52 (L) >60 mL/min/1.73m2   [facility]    Narrative    The following orders were created for panel order [facility].  Procedure                               Abnormality         Status                     ---------                               -----------         ------                     Extra Blue Top Tube[[id number]]                              Final result               Extra Red Top Tube[[id number]]                               Final result               Extra Green Top (Lithium...[[id number]]                      Final result               Extra Purple Top Tube[[id number]]                            Final result                 Please view results for these tests on the individual orders.   CBC with platelets and differential   Result Value Ref Range    WBC Count 6.1 4.0 - 11.0 10e3/uL    RBC Count 4.88 4.40 - 5.90 10e6/uL    Hemoglobin 14.3 13.3 - 17.7 g/dL     Hematocrit 44.0 40.0 - 53.0 %    MCV 90 78 - 100 fL    MCH 29.3 26.5 - 33.0 pg    MCHC 32.5 31.5 - 36.5 g/dL    RDW 13.0 10.0 - 15.0 %    Platelet Count 226 150 - 450 10e3/uL    % Neutrophils 65 %    % Lymphocytes 17 %    % Monocytes 9 %    % Eosinophils 8 %    % Basophils 1 %    % Immature Granulocytes 0 %    NRBCs per 100 WBC 0 <1 /100    Absolute Neutrophils 3.9 1.6 - 8.3 10e3/uL    Absolute Lymphocytes 1.0 0.8 - 5.3 10e3/uL    Absolute Monocytes 0.6 0.0 - 1.3 10e3/uL    Absolute Eosinophils 0.5 0.0 - 0.7 10e3/uL    Absolute Basophils 0.1 0.0 - 0.2 10e3/uL    Absolute Immature Granulocytes 0.0 <=0.4 10e3/uL    Absolute NRBCs 0.0 10e3/uL   Extra Blue Top Tube   Result Value Ref Range    Hold Specimen JIC    Extra Red Top Tube   Result Value Ref Range    Hold Specimen JIC    Extra Green Top (Lithium Heparin) Tube   Result Value Ref Range    Hold Specimen JIC    Extra Purple Top Tube   Result Value Ref Range    Hold Specimen JIC    CT Head w/o Contrast    Narrative    CT SCAN OF THE HEAD WITHOUT CONTRAST   11/25/2022 9:24 AM     HISTORY: Facial droop.    TECHNIQUE: Axial images of the head and coronal reformations without  IV contrast material. Radiation dose for this scan was reduced using  automated exposure control, adjustment of the mA and/or kV according  to patient size, or iterative reconstruction technique.    COMPARISON: None.    FINDINGS: There is no evidence of intracranial hemorrhage, mass, or  anomaly. The ventricles are normal in size, shape and configuration.  Mild patchy periventricular white matter hypodensities which are  nonspecific, but likely related to chronic microvascular ischemic  disease.     The visualized portions of the sinuses and mastoids appear normal. The  bony calvarium and bones of the skull base appear intact.       Impression    IMPRESSION:     1. No evidence of acute intracranial hemorrhage, mass, or herniation.  2. Mild nonspecific white matter changes likely due to  chronic  microvascular ischemic disease.      JIMMY BARAKAT MD         SYSTEM ID:  [id number]   CTA Head Neck with Contrast    Narrative    CT ANGIOGRAM OF THE HEAD AND NECK WITH CONTRAST  November 25, 2022  9:25 AM     HISTORY: Facial droop.    TECHNIQUE: CT angiography with an injection of 68 mL Isovue 370 IV  with scans through the head and neck. Images were transferred to a  separate 3-D workstation where multiplanar reformations and 3-D images  were created. Estimates of carotid stenoses are made relative to the  distal internal carotid artery diameters except as noted. Radiation  dose for this scan was reduced using automated exposure control,  adjustment of the mA and/or kV according to patient size, or iterative  reconstruction technique.      COMPARISON: None.     CT HEAD FINDINGS: No contrast enhancing lesions. Cerebral blood flow  is grossly normal.     CT ANGIOGRAM HEAD FINDINGS: The major intracranial arteries including  the proximal branches of the anterior cerebral, middle cerebral, and  posterior cerebral arteries appear patent without vascular cutoff. No  aneurysm identified. No significant stenosis. Venous circulation is  unremarkable.     CT ANGIOGRAM NECK FINDINGS: Normal origin of the great vessels from  the aortic arch.     Right carotid artery: The right common and internal carotid arteries  are patent. Atherosclerotic disease at the carotid bifurcation and  proximal internal carotid artery. This results in approximately 50%  stenosis by NASCET criteria.     Left carotid artery: The left common and internal carotid arteries are  patent. Mild atherosclerotic disease at the carotid bifurcation  without stenosis.     Vertebral arteries: Vertebral arteries are patent without evidence of  dissection. No significant stenosis.     Other findings: Degenerative changes in the spine.       Impression    IMPRESSION:   1. Patent arteries in the neck without evidence of dissection.  Atherosclerotic disease  "at the carotid arteries bilaterally right  greater than left. There is approximately 50% stenosis of the right  carotid artery. No stenosis of the left carotid artery.  2. Patent proximal major intracranial arteries without vascular  cutoff. No significant stenosis. No aneurysm identified.      JIMMY BARAKAT MD         SYSTEM ID:  [id number]       Medications   iopamidol (ISOVUE-370) solution 68 mL (68 mLs Intravenous Given 11/25/22 0910)   sodium chloride 0.9 % bag 500mL for CT scan flush use (100 mLs As instructed Given 11/25/22 0910)       Assessments & Plan (with Medical Decision Making)     76-year-old male presented with 3 days of right facial droop noticed when he looked in the mirror.  He had no other neurologic symptoms including no vision loss, word finding difficulty, weakness or numbness in the extremities, limb ataxia, or other worrisome symptoms to suggest a central cause.  Physical examination showed 7th cranial nerve palsy, House-Brackman grade 5.  There was involvement of the forehead consistent with peripheral neuropathy.  His diagnosis is consistent with \"Bell's palsy.\"  We discussed the nature of this.  I will send Lyme serologies but my suspicion for Lyme disease causing peripheral neuropathy is low.  He has no other symptoms of Lyme disease and timing is not typical.  He does have inability to completely close his eyes and so I instructed him in how to lubricate and tape his eye shut in the evening and using lubrication during the day.  I would like him to follow-up in primary care next week and I have also referred him to neurology for follow-up as well.  We discussed the need for prednisone therapy and this was prescribed to help reduce the risk of permanent nerve injury but his risk is high given the degree of his paresis at this time.  He and his wife expressed understanding and their questions were answered.    In addition I prescribed his lisinopril that he has not been able to get " refilled so he can get back on his chronic antihypertensive medications and he will follow-up in primary care next week in this regard as well.    I have reviewed the nursing notes.    I have reviewed the findings, diagnosis, plan and need for follow up with the patient.       Discharge Medication List as of 11/25/2022 10:53 AM      START taking these medications    Details   !! lisinopril (ZESTRIL) 20 MG tablet Take 1 tablet (20 mg) by mouth daily, Disp-30 tablet, R-1, E-Prescribe      predniSONE (DELTASONE) 10 MG tablet Take 3 tablets (30 mg) by mouth 2 times daily for 7 days, Disp-42 tablet, R-0, E-Prescribe       !! - Potential duplicate medications found. Please discuss with provider.          Final diagnoses:   Cranial nerve VII palsy   Benign essential hypertension       11/25/2022   [facility] EMERGENCY DEPT     Chang Hough MD  11/25/22 [id number]

## 2022-12-01 ASSESSMENT — ENCOUNTER SYMPTOMS
NERVOUS/ANXIOUS: 0
SORE THROAT: 0
ARTHRALGIAS: 0
PALPITATIONS: 0
MYALGIAS: 0
FEVER: 0
JOINT SWELLING: 0
EYE PAIN: 0
HEADACHES: 0
ABDOMINAL PAIN: 0
DIZZINESS: 0
DYSURIA: 0
DIARRHEA: 0
SHORTNESS OF BREATH: 0
FREQUENCY: 0
CHILLS: 0
NAUSEA: 0
HEMATOCHEZIA: 0
COUGH: 0
HEARTBURN: 0
HEMATURIA: 0
CONSTIPATION: 0
PARESTHESIAS: 0
WEAKNESS: 0

## 2022-12-01 ASSESSMENT — ACTIVITIES OF DAILY LIVING (ADL): CURRENT_FUNCTION: NO ASSISTANCE NEEDED

## 2022-12-06 NOTE — TELEPHONE ENCOUNTER
FUTURE VISIT INFORMATION      FUTURE VISIT INFORMATION:    Date: 12/9/2022    Time: 345pm    [place] [facility] [place]:    Referring provider:  Dr. Parks    Referring providers clinic:  [facility]    Reason for visit/diagnosis  ED follow-up     RECORDS REQUESTED FROM:       Clinic name Comments Records Status Imaging Status   Internal ED Visit-11/25/2022    CTA Head/Neck-11/25/2022 Epic PACS

## 2022-12-08 ENCOUNTER — OFFICE VISIT (OUTPATIENT)
Dept: FAMILY MEDICINE | Facility: CLINIC | Age: 77
End: 2022-12-08
Payer: COMMERCIAL

## 2022-12-08 VITALS
SYSTOLIC BLOOD PRESSURE: 160 MMHG | HEART RATE: 53 BPM | TEMPERATURE: 97.3 F | DIASTOLIC BLOOD PRESSURE: 82 MMHG | WEIGHT: 251.2 LBS | OXYGEN SATURATION: 100 % | HEIGHT: 71 IN | BODY MASS INDEX: 35.17 KG/M2 | RESPIRATION RATE: 18 BRPM

## 2022-12-08 DIAGNOSIS — E11.9 CONTROLLED TYPE 2 DIABETES MELLITUS WITHOUT COMPLICATION, WITHOUT LONG-TERM CURRENT USE OF INSULIN (H): ICD-10-CM

## 2022-12-08 DIAGNOSIS — E66.01 MORBID OBESITY (H): ICD-10-CM

## 2022-12-08 DIAGNOSIS — G51.0 BELL'S PALSY: ICD-10-CM

## 2022-12-08 DIAGNOSIS — E03.9 HYPOTHYROIDISM, UNSPECIFIED TYPE: ICD-10-CM

## 2022-12-08 DIAGNOSIS — Z00.00 ENCOUNTER FOR MEDICARE ANNUAL WELLNESS EXAM: Primary | ICD-10-CM

## 2022-12-08 DIAGNOSIS — I10 UNCONTROLLED HYPERTENSION: ICD-10-CM

## 2022-12-08 DIAGNOSIS — Z23 NEED FOR PROPHYLACTIC VACCINATION AND INOCULATION AGAINST INFLUENZA: ICD-10-CM

## 2022-12-08 DIAGNOSIS — R31.29 MICROSCOPIC HEMATURIA: ICD-10-CM

## 2022-12-08 DIAGNOSIS — N18.31 STAGE 3A CHRONIC KIDNEY DISEASE (H): ICD-10-CM

## 2022-12-08 DIAGNOSIS — Z23 HIGH PRIORITY FOR 2019-NCOV VACCINE: ICD-10-CM

## 2022-12-08 LAB
ALBUMIN UR-MCNC: ABNORMAL MG/DL
APPEARANCE UR: CLEAR
BILIRUB UR QL STRIP: NEGATIVE
COLOR UR AUTO: YELLOW
GLUCOSE UR STRIP-MCNC: NEGATIVE MG/DL
HBA1C MFR BLD: 6.8 % (ref 0–5.6)
HGB BLD-MCNC: 14.2 G/DL (ref 13.3–17.7)
HGB UR QL STRIP: ABNORMAL
KETONES UR STRIP-MCNC: NEGATIVE MG/DL
LEUKOCYTE ESTERASE UR QL STRIP: NEGATIVE
MUCOUS THREADS #/AREA URNS LPF: PRESENT /LPF
NITRATE UR QL: NEGATIVE
PH UR STRIP: 5.5 [PH] (ref 5–7)
RBC #/AREA URNS AUTO: ABNORMAL /HPF
SP GR UR STRIP: >=1.03 (ref 1–1.03)
SQUAMOUS #/AREA URNS AUTO: ABNORMAL /LPF
TSH SERPL DL<=0.005 MIU/L-ACNC: 4.1 UIU/ML (ref 0.3–4.2)
UROBILINOGEN UR STRIP-ACNC: 0.2 E.U./DL
WBC #/AREA URNS AUTO: ABNORMAL /HPF

## 2022-12-08 PROCEDURE — G0008 ADMIN INFLUENZA VIRUS VAC: HCPCS | Mod: 59 | Performed by: FAMILY MEDICINE

## 2022-12-08 PROCEDURE — 85018 HEMOGLOBIN: CPT | Performed by: FAMILY MEDICINE

## 2022-12-08 PROCEDURE — 90662 IIV NO PRSV INCREASED AG IM: CPT | Performed by: FAMILY MEDICINE

## 2022-12-08 PROCEDURE — 0134A COVID-19 VACCINE BIVALENT BOOSTER 18+ (MODERNA): CPT | Performed by: FAMILY MEDICINE

## 2022-12-08 PROCEDURE — G0439 PPPS, SUBSEQ VISIT: HCPCS | Performed by: FAMILY MEDICINE

## 2022-12-08 PROCEDURE — 83036 HEMOGLOBIN GLYCOSYLATED A1C: CPT | Performed by: FAMILY MEDICINE

## 2022-12-08 PROCEDURE — 36415 COLL VENOUS BLD VENIPUNCTURE: CPT | Performed by: FAMILY MEDICINE

## 2022-12-08 PROCEDURE — 99214 OFFICE O/P EST MOD 30 MIN: CPT | Mod: 25 | Performed by: FAMILY MEDICINE

## 2022-12-08 PROCEDURE — 91313 COVID-19 VACCINE BIVALENT BOOSTER 18+ (MODERNA): CPT | Performed by: FAMILY MEDICINE

## 2022-12-08 PROCEDURE — 81001 URINALYSIS AUTO W/SCOPE: CPT | Performed by: FAMILY MEDICINE

## 2022-12-08 PROCEDURE — 86618 LYME DISEASE ANTIBODY: CPT | Performed by: FAMILY MEDICINE

## 2022-12-08 PROCEDURE — 84443 ASSAY THYROID STIM HORMONE: CPT | Performed by: FAMILY MEDICINE

## 2022-12-08 RX ORDER — LISINOPRIL 20 MG/1
30 TABLET ORAL DAILY
Qty: 30 TABLET | Refills: 1
Start: 2022-12-08 | End: 2022-12-14

## 2022-12-08 RX ORDER — LEVOTHYROXINE SODIUM 137 UG/1
137 TABLET ORAL DAILY
Qty: 90 TABLET | Refills: 3 | Status: SHIPPED | OUTPATIENT
Start: 2022-12-08 | End: 2023-03-08

## 2022-12-08 RX ORDER — LEVOTHYROXINE SODIUM 137 UG/1
137 TABLET ORAL DAILY
Qty: 90 TABLET | Refills: 0 | Status: CANCELLED | OUTPATIENT
Start: 2022-12-08

## 2022-12-08 ASSESSMENT — ENCOUNTER SYMPTOMS
COUGH: 0
SORE THROAT: 0
WEAKNESS: 0
NERVOUS/ANXIOUS: 0
CONSTIPATION: 0
ABDOMINAL PAIN: 0
PARESTHESIAS: 0
HEARTBURN: 0
NAUSEA: 0
SHORTNESS OF BREATH: 0
HEMATURIA: 0
HEADACHES: 0
PALPITATIONS: 0
EYE PAIN: 0
DIARRHEA: 0
ARTHRALGIAS: 0
FEVER: 0
MYALGIAS: 0
DYSURIA: 0
CHILLS: 0
DIZZINESS: 0
FREQUENCY: 0
HEMATOCHEZIA: 0
JOINT SWELLING: 0

## 2022-12-08 ASSESSMENT — ACTIVITIES OF DAILY LIVING (ADL): CURRENT_FUNCTION: NO ASSISTANCE NEEDED

## 2022-12-08 ASSESSMENT — PAIN SCALES - GENERAL: PAINLEVEL: NO PAIN (0)

## 2022-12-08 NOTE — PROGRESS NOTES
"SUBJECTIVE:   Don is a 76 year old who presents for Preventive Visit.    Patient has been advised of split billing requirements and indicates understanding: Yes  Are you in the first 12 months of your Medicare coverage?  No    Healthy Habits:     In general, how would you rate your overall health?  Good    Frequency of exercise:  1 day/week    Duration of exercise:  15-30 minutes    Do you usually eat at least 4 servings of fruit and vegetables a day, include whole grains    & fiber and avoid regularly eating high fat or \"junk\" foods?  Yes    Taking medications regularly:  Yes    Ability to successfully perform activities of daily living:  No assistance needed    Home Safety:  No safety concerns identified    Hearing Impairment:  No hearing concerns    In the past 6 months, have you been bothered by leaking of urine?  No    In general, how would you rate your overall mental or emotional health?  Excellent      PHQ-2 Total Score: 0    Additional concerns today:  No    Patient reports he needs refill on lisinopril.      Have you ever done Advance Care Planning? (For example, a Health Directive, POLST, or a discussion with a medical provider or your loved ones about your wishes): Yes, patient states has an Advance Care Planning document and will bring a copy to the clinic.      Fall risk  Fallen 2 or more times in the past year?: No  Any fall with injury in the past year?: No    Cognitive Screening   1) Repeat 3 items (Leader, Season, Table)    2) Clock draw: NORMAL  3) 3 item recall: Recalls 2 objects   Results: NORMAL clock, 1-2 items recalled: COGNITIVE IMPAIRMENT LESS LIKELY    Mini-CogTM Copyright EZEKIEL Pandey. Licensed by the author for use in [facility]; reprinted with permission (manjit@.[facility]). All rights reserved.      Do you have sleep apnea, excessive snoring or daytime drowsiness?: no    Reviewed and updated as needed this visit by clinical staff   Tobacco  Allergies  Meds  Problems         "     Reviewed and updated as needed this visit by Provider    Allergies  Meds  Problems            Social History     Tobacco Use     Smoking status: Former     Packs/day: 1.00     Years: 20.00     Pack years: 20.00     Types: Cigarettes     Start date: 1964     Quit date: 1984     Years since quittin.9     Smokeless tobacco: Never   Substance Use Topics     Alcohol use: Yes     Comment: one beer or mixed drink a day in warmer weather     If you drink alcohol do you typically have >3 drinks per day or >7 drinks per week? No    Alcohol Use 2022   Prescreen: >3 drinks/day or >7 drinks/week? -   Prescreen: >3 drinks/day or >7 drinks/week? No         Current providers sharing in care for this patient include:   Patient Care Team:  Gilberto Alonzo MD as PCP - General (Family Medicine)  Rojelio Horton MD as Assigned Surgical Provider  Gilberto Alonzo MD as Assigned PCP  Case Alexander MD as MD (Cardiovascular Disease)  Patti Holland MD as MD (Cardiovascular Disease)  Patti Holland MD as Assigned Heart and Vascular Provider    The following health maintenance items are reviewed in Epic and correct as of today:  Health Maintenance   Topic Date Due     ZOSTER IMMUNIZATION (2 of 3) 2016     LIPID  2023     MICROALBUMIN  2023     DIABETIC FOOT EXAM  2023     ANNUAL REVIEW OF HM ORDERS  2023     A1C  2023     EYE EXAM  2023     BMP  2023     MEDICARE ANNUAL WELLNESS VISIT  2023     FALL RISK ASSESSMENT  2023     HEMOGLOBIN  2023     ADVANCE CARE PLANNING  2027     DTAP/TDAP/TD IMMUNIZATION (3 - Td or Tdap) 2032     COLORECTAL CANCER SCREENING  2032     HEPATITIS C SCREENING  Completed     PHQ-2 (once per calendar year)  Completed     INFLUENZA VACCINE  Completed     Pneumococcal Vaccine: 65+ Years  Completed     URINALYSIS  Completed     AORTIC ANEURYSM SCREENING (SYSTEM  ASSIGNED)  Completed     COVID-19 Vaccine  Completed     IPV IMMUNIZATION  Aged Out     MENINGITIS IMMUNIZATION  Aged Out     Patient Active Problem List   Diagnosis     Stage 3a chronic kidney disease (H)     Coronary artery disease involving native heart without angina pectoris, unspecified vessel or lesion type     Controlled type 2 diabetes mellitus without complication, without long-term current use of insulin (H)     Uncontrolled hypertension     Hypothyroidism, unspecified type     Past Surgical History:   Procedure Laterality Date     BIOPSY      Tongue     COLONOSCOPY       COLONOSCOPY N/A 2022    Procedure: COLONOSCOPY;  Surgeon: Greer White MD;  Location: [facility]     EYE SURGERY  2019    cataracts     GLOSSECTOMY PARTIAL Right 2021    Procedure: GLOSSECTOMY, PARTIAL;  Surgeon: Rojelio Horton MD;  Location: [facility]     ORTHOPEDIC SURGERY  dec  23, 2020    R Hip       Social History     Tobacco Use     Smoking status: Former     Packs/day: 1.00     Years: 20.00     Pack years: 20.00     Types: Cigarettes     Start date: 1964     Quit date: 1984     Years since quittin.9     Smokeless tobacco: Never   Substance Use Topics     Alcohol use: Yes     Comment: one beer or mixed drink a day in warmer weather     Family History   Problem Relation Age of Onset     Arthritis Mother      Cerebrovascular Disease Father      Diabetes Sister          Current Outpatient Medications   Medication Sig Dispense Refill     aspirin (ASA) 81 MG EC tablet Take 81 mg by mouth 2 times daily (with meals)       atenolol (TENORMIN) 25 MG tablet Take 0.5 tablets (12.5 mg) by mouth daily 15 tablet 1     cetirizine (ZYRTEC) 10 MG tablet Take 10 mg by mouth every evening       levothyroxine (SYNTHROID/LEVOTHROID) 137 MCG tablet Take 1 tablet (137 mcg) by mouth daily Hold until patient calls for refill. 90 tablet 3     lisinopril (ZESTRIL) 20 MG tablet Take 1.5 tablets (30 mg) by mouth daily 30 tablet 1      loratadine (CLARITIN) 10 MG tablet Take 10 mg by mouth       bisacodyl (DULCOLAX) 5 MG EC tablet Take 2 tablets at 3 pm the day before your procedure. If your procedure is before 11 am, take 2 additional tablets at 11 pm. If your procedure is after 11 am, take 2 additional tablets at 6 am. For additional instructions refer to your colonoscopy prep instructions. 4 tablet 0     furosemide (LASIX) 20 MG tablet Take 20 mg by mouth as needed (Patient not taking: Reported on 12/8/2022)       Glucose Blood (BLOOD GLUCOSE TEST STRIPS) STRP by In Vitro route daily       polyethylene glycol (GOLYTELY) 236 g suspension The night before the exam at 6 pm drink an 8-ounce glass every 15 minutes until the jug is half empty. If you arrive before 11 AM: Drink the other half of the Golytely jug at 11 PM night before procedure. If you arrive after 11 AM: Drink the other half of the Golytely jug at 6 AM day of procedure. For additional instructions refer to your colonoscopy prep instructions. 4000 mL 0     Allergies   Allergen Reactions     Amoxicillin GI Disturbance     N, V,  D   6/21  when     took   for   dental prophylaxis      Seasonal Allergies      Simvastatin Muscle Pain (Myalgia)     Pneumonia Vaccine: completed.        Review of Systems   Constitutional: Negative for chills and fever.   HENT: Negative for congestion, ear pain, hearing loss and sore throat.    Eyes: Negative for pain and visual disturbance.   Respiratory: Negative for cough and shortness of breath.    Cardiovascular: Negative for chest pain, palpitations and peripheral edema.   Gastrointestinal: Negative for abdominal pain, constipation, diarrhea, heartburn, hematochezia and nausea.   Genitourinary: Positive for impotence. Negative for dysuria, frequency, genital sores, hematuria, penile discharge and urgency.   Musculoskeletal: Negative for arthralgias, joint swelling and myalgias.   Skin: Negative for rash.   Neurological: Negative for dizziness, weakness,  "headaches and paresthesias.   Psychiatric/Behavioral: Negative for mood changes. The patient is not nervous/anxious.      Patient said he had been prescribed furosemide by previous PCP for him to take as needed for edema. He seldom uses it.    Bell's palsy diagnosed at the ER on 11/25/2022. Has appt with neurology tomorrow. States stable right facial paresis from this.      OBJECTIVE:   BP (!) 160/82   Pulse 53   Temp 97.3  F (36.3  C) (Tympanic)   Resp 18   Ht 1.791 m (5' 10.5\")   Wt 113.9 kg (251 lb 3.2 oz)   SpO2 100%   BMI 35.53 kg/m   Estimated body mass index is 35.53 kg/m  as calculated from the following:    Height as of this encounter: 1.791 m (5' 10.5\").    Weight as of this encounter: 113.9 kg (251 lb 3.2 oz).  Physical Exam  GENERAL APPEARANCE: obese,  alert and no distress  EYES: pink conj, no icterus, PERRL, EOMI  HENT: ear canals and TM's normal, nose and mouth without ulcers or lesions, oropharynx clear and oral mucous membranes moist  NECK: no adenopathy, no asymmetry, masses, or scars and thyroid normal to palpation  RESP: lungs clear to auscultation - no rales, rhonchi or wheezes  CV: regular rates and rhythm, normal S1 S2, no S3 or S4, no murmur, click or rub, no peripheral edema and peripheral pulses strong  ABDOMEN: soft, nontender, no hepatosplenomegaly, no masses and bowel sounds normal  RECTAL: normal sphincter tone, no rectal masses, prostate slightly enlarged but smooth, soft and nontender  MS: no musculoskeletal defects are noted and gait is age appropriate without ataxia  SKIN: no suspicious lesions or rashes  NEURO: Normal strength and tone, sensory exam grossly normal, mentation intact and speech normal    Diagnostic Test Results:  none     ASSESSMENT / PLAN:   Anil was seen today for wellness visit, imm/inj and imm/inj.    Diagnoses and all orders for this visit:    Encounter for Medicare annual wellness exam  Patient was advised on recommended screening and preventive health " recommendations.  He verbalized understanding and agreed to the plans below.    Hypothyroidism, unspecified type  -     TSH with free T4 reflex  -     levothyroxine (SYNTHROID/LEVOTHROID) 137 MCG tablet; Take 1 tablet (137 mcg) by mouth daily Hold until patient calls for refill.  Obtained TSH level first. TSH  Level released few hours after patient left clinic.  Continue current dose of levothyroxine.  Recheck lab in a year; sooner if with sx of hypothyroidism.    Uncontrolled hypertension  -     lisinopril (ZESTRIL) 20 MG tablet; Take 1.5 tablets (30 mg) by mouth daily  -     OFFICE/OUTPT VISIT,EST,LEVL IV  Patient was advised BP uncontrolled today in spite of lisinopril 20 mg daily  Reviewed meds with patient.  Increased lisinopril to 30 mg daily to optimize management.  Reinforced sodium restriction.  Exercise recommendations reviewed with patient.  Recheck in 2 weeks.     Bell's palsy  -     OFFICE/OUTPT VISIT,EST,LEVL IV  Stable per patient.  Advised right eye protection.  He will be seeing neurology tomorrow.  Return precautions discussed and given to patient.     Controlled type 2 diabetes mellitus without complication, without long-term current use of insulin (H)  -     Hemoglobin A1c  Lab released from standing order.    Stage 3a chronic kidney disease (H)  -     Hemoglobin  -     UA reflex to Microscopic  -     Urine Microscopic Exam  Stable renal function on recent labs.  Maintain hydration. Avoid nephrotoxins.    Need for prophylactic vaccination and inoculation against influenza  -     INFLUENZA, QUAD, HIGH DOSE, PF, 65YR + (FLUZONE HD)    High priority for 2019-nCoV vaccine  -     COVID-19,PF,MODERNA BIVALENT 18+Yrs    Microscopic hematuria  -     UA reflex to Microscopic; Future    Morbid obesity  This increases complexity in managing his chronic medical conditions above.  Patient was advised healthy diet recommendations.  Patient was advised weekly exercise recommendations and goals.       Patient  "has been advised of split billing requirements and indicates understanding: Yes      COUNSELING:  Reviewed preventive health counseling, as reflected in patient instructions      BMI:   Estimated body mass index is 35.53 kg/m  as calculated from the following:    Height as of this encounter: 1.791 m (5' 10.5\").    Weight as of this encounter: 113.9 kg (251 lb 3.2 oz).   Weight management plan: Discussed healthy diet and exercise guidelines      He reports that he quit smoking about 38 years ago. His smoking use included cigarettes. He started smoking about 58 years ago. He has a 20.00 pack-year smoking history. He has never used smokeless tobacco.      Appropriate preventive services were discussed with this patient, including applicable screening as appropriate for cardiovascular disease, diabetes, osteopenia/osteoporosis, and glaucoma.  As appropriate for age/gender, discussed screening for colorectal cancer, prostate cancer, breast cancer, and cervical cancer. Checklist reviewing preventive services available has been given to the patient.    Reviewed patients plan of care and provided an AVS. The Basic Care Plan (routine screening as documented in Health Maintenance) and Complex Care Plan (for patients with higher acuity and needing more deliberate coordination of services) for Anil meets the Care Plan requirement. This Care Plan has been established and reviewed with the Patient.      Gilberto Alonzo MD  [facility]    Identified Health Risks:  "

## 2022-12-08 NOTE — PATIENT INSTRUCTIONS
You may get the Shingrix vaccine for shingles if you desire, and after you verify with insurance how they cover the vaccine.     You will be contacted in 1-2 days for results of your lab tests.     See neurology for your Bell's palsy.  Be sure to take measures to prevent the right eye from drying up, particularly during sleep.    Increase lisinopril 20 mg to ONE AND A HALF TABLETS orally once a day.    Be consistent with low salt, low trans fat and low saturated fat diet.  Eat food rich in omega-3-fatty acids as you tolerate. (salmon, olive oil)  Eat 5 cups of vegetables, fruits and whole grains per day.  Limit starchy food (white rice, white bread, white pasta, white potatoes) to less than a cup per meal.  Minimize sweets, junk food and fastfood. Limit soda beverages to one serving per day; best to avoid it altogether though.    Exercise: moderate intensity sustained for at least 30 mins per episode, goal of 150 mins per week at least  Combine cardiovascular and resistance exercises.  These exercise recommendations are in addition to your daily activity at work or home.  Work on losing weight.    Preventative Care Visits include: Yearly physicals, Well-child visits, Welcome to Medicare visits, & Medicare yearly wellness exams.    The purpose of these visits is to discuss your medical history and prevent health problems before you are sick.  You may need to pay a copay, coinsurance or deductible if your visit today includes testing or treating for a new or existing condition.    Additional charges may be incurred for today's visit. If you have questions about what your insurance plan covers, please contact your Insurance Company's member service department.  If you have questions specific to a bill you have already received from [facility], please contact the Corporate Billing Office at [phone].          Patient Education   Personalized Prevention Plan  You are due for the preventive services outlined below.   Your care team is available to assist you in scheduling these services.  If you have already completed any of these items, please share that information with your care team to update in your medical record.  Health Maintenance Due   Topic Date Due    Urine Test  Never done    Zoster (Shingles) Vaccine (2 of 3) 05/25/2016    A1C Lab  07/18/2022     Preventive Health Recommendations  See your health care provider every year to  Review health changes.   Discuss preventive care.    Review your medicines if your doctor has prescribed any.  Talk with your health care provider about whether you should have a test to screen for prostate cancer (PSA).  Every 3 years, have a diabetes test (fasting glucose). If you are at risk for diabetes, you should have this test more often.  Every 5 years, have a cholesterol test. Have this test more often if you are at risk for high cholesterol or heart disease.   Every 10 years, have a colonoscopy. Or, have a yearly FIT test (stool test). These exams will check for colon cancer.  Talk to with your health care provider about screening for Abdominal Aortic Aneurysm if you have a family history of AAA or have a history of smoking.    Shots:   Get a flu shot each year.   Get a tetanus shot every 10 years.   Talk to your doctor about your pneumonia vaccines. There are now two you should receive - Pneumovax (PPSV 23) and Prevnar (PCV 13).  Talk to your pharmacist about a shingles vaccine.   Talk to your doctor about the hepatitis B vaccine.    Nutrition:   Eat at least 5 servings of fruits and vegetables each day.   Eat whole-grain bread, whole-wheat pasta and brown rice instead of white grains and rice.   Get adequate Calcium and Vitamin D.     Lifestyle  Exercise for at least 150 minutes a week (30 minutes a day, 5 days a week). This will help you control your weight and prevent disease.   Limit alcohol to one drink per day.   No smoking.   Wear sunscreen to prevent skin cancer.   See your  dentist every six months for an exam and cleaning.   See your eye doctor every 1 to 2 years to screen for conditions such as glaucoma, macular degeneration and cataracts.    Personalized Prevention Plan  You are due for the preventive services outlined below.  Your care team is available to assist you in scheduling these services.  If you have already completed any of these items, please share that information with your care team to update in your medical record.  Health Maintenance   Topic Date Due    URINALYSIS  Never done    ZOSTER IMMUNIZATION (2 of 3) 05/25/2016    A1C  07/18/2022    LIPID  01/20/2023    MICROALBUMIN  01/20/2023    DIABETIC FOOT EXAM  01/20/2023    ANNUAL REVIEW OF HM ORDERS  01/20/2023    EYE EXAM  03/31/2023    BMP  11/25/2023    HEMOGLOBIN  11/25/2023    MEDICARE ANNUAL WELLNESS VISIT  12/08/2023    FALL RISK ASSESSMENT  12/08/2023    ADVANCE CARE PLANNING  12/08/2027    DTAP/TDAP/TD IMMUNIZATION (3 - Td or Tdap) 01/20/2032    COLORECTAL CANCER SCREENING  11/07/2032    HEPATITIS C SCREENING  Completed    PHQ-2 (once per calendar year)  Completed    INFLUENZA VACCINE  Completed    Pneumococcal Vaccine: 65+ Years  Completed    AORTIC ANEURYSM SCREENING (SYSTEM ASSIGNED)  Completed    COVID-19 Vaccine  Completed    IPV IMMUNIZATION  Aged Out    MENINGITIS IMMUNIZATION  Aged Out       Exercise for a Healthier Heart  You may wonder how you can improve the health of your heart. If you re thinking about exercise, you re on the right track. You don t need to become an athlete. But you do need a certain amount of brisk exercise to help strengthen your heart. If you have been diagnosed with a heart condition, your healthcare provider may advise exercise to help stabilize your condition. To help make exercise a habit, choose safe, fun activities.      Exercise with a friend. When activity is fun, you're more likely to stick with it.   Before you start  Check with your healthcare provider before  starting an exercise program. This is especially important if you have not been active for a while. It's also important if you have a long-term (chronic) health problem such as heart disease, diabetes, or obesity. Or if you are at high risk for having these problems.   Why exercise?  Exercising regularly offers many healthy rewards. It can help you do all of the following:   Improve your blood cholesterol level to help prevent further heart trouble  Lower your blood pressure to help prevent a stroke or heart attack  Control diabetes, or reduce your risk of getting this disease  Improve your heart and lung function  Reach and stay at a healthy weight  Make your muscles stronger so you can stay active  Prevent falls and fractures by slowing the loss of bone mass (osteoporosis)  Manage stress better  Reduce your blood pressure  Improve your sense of self and your body image  Exercise tips    Ease into your routine. Set small goals. Then build on them. If you are not sure what your activity level should be, talk with your healthcare provider first before starting an exercise routine.  Exercise on most days. Aim for a total of 150 minutes (2 hours and 30 minutes) or more of moderate-intensity aerobic activity each week. Or 75 minutes (1 hour and 15 minutes) or more of vigorous-intensity aerobic activity each week. Or try for a combination of both. Moderate activity means that you breathe heavier and your heart rate increases but you can still talk. Think about doing 40 minutes of moderate exercise, 3 to 4 times a week. For best results, activity should last for about 40 minutes to lower blood pressure and cholesterol. It's OK to work up to the 40-minute period over time. Examples of moderate-intensity activity are walking 1 mile in 15 minutes. Or doing 30 to 45 minutes of yard work.  Step up your daily activity level.  Along with your exercise program, try being more active the whole day. Walk instead of drive. Or park  further away so that you take more steps each day. Do more household tasks or yard work. You may not be able to meet the advised mount of physical activity. But doing some moderate- or vigorous-intensity aerobic activity can help reduce your risk for heart disease. Your healthcare provider can help you figure out what is best for you.  Choose 1 or more activities you enjoy.  Walking is one of the easiest things you can do. You can also try swimming, riding a bike, dancing, or taking an exercise class.    When to call your healthcare provider  Call your healthcare provider if you have any of these:   Chest pain or feel dizzy or lightheaded  Burning, tightness, pressure, or heaviness in your chest, neck, shoulders, back, or arms  Abnormal shortness of breath  More joint or muscle pain  A very fast or irregular heartbeat (palpitations)  Miguel Angel last reviewed this educational content on 7/1/2019 2000-2021 [employer]. All rights reserved. This information is not intended as a substitute for professional medical care. Always follow your healthcare professional's instructions.

## 2022-12-08 NOTE — PROGRESS NOTES
[facility] Neurology Consultation    Anil Gutierres Jr. MRN# [id number]   Age: 76 year old YOB: 1945     Requesting physician: Gilberto Hernandez     Reason for Consultation: right facial weakness      History of Presenting Symptoms:   Anil Gutierres Jr. is a 76 year old male who presents today for evaluation of right facial weakness.     Patient developed acute right sided facial weakness on 11/25/2022. He presented to ER and examination was thought to be consistent with Bell's palsy. He has since completed week of treatment with prednisone. He hasn't noticed any clear improvement since onset. He is not able to completely close right eye. He has been using artificial tears and lubricant, which has helped prevent dryness. He also has noticed changes in taste. He denies any facial pain, headaches, numbness, right arm/leg symptoms, hearing loss, vertigo. He feels like maybe his balance is a little more off, but he had balance issues previously as well.       Past Medical History:     Patient Active Problem List   Diagnosis     Stage 3a chronic kidney disease (H)     Coronary artery disease involving native heart without angina pectoris, unspecified vessel or lesion type     Controlled type 2 diabetes mellitus without complication, without long-term current use of insulin (H)     Uncontrolled hypertension     Hypothyroidism, unspecified type     Morbid obesity (H)     Past Medical History:   Diagnosis Date     Arthritis 2018    L knee, R hip     Basal cell carcinoma      Coronary artery disease involving native heart without angina pectoris, unspecified vessel or lesion type 6/27/2022     Diabetes (H) 2000    Type 2     Hypertension 2000     Thyroid disease 2000    Hypothyroidism        Past Surgical History:     Past Surgical History:   Procedure Laterality Date     BIOPSY  2021    Tongue     COLONOSCOPY  2017     COLONOSCOPY N/A 11/7/2022    Procedure: COLONOSCOPY;  Surgeon: Cindy,  MD Greer;  Location: [facility]     EYE SURGERY  2019    cataracts     GLOSSECTOMY PARTIAL Right 2021    Procedure: GLOSSECTOMY, PARTIAL;  Surgeon: Rojelio Horton MD;  Location: [facility]     ORTHOPEDIC SURGERY  dec  23, 2020    R Hip        Social History:     Social History     Tobacco Use     Smoking status: Former     Packs/day: 1.00     Years: 20.00     Pack years: 20.00     Types: Cigarettes     Start date: 1964     Quit date: 1984     Years since quittin.9     Smokeless tobacco: Never   Vaping Use     Vaping Use: Never used   Substance Use Topics     Alcohol use: Yes     Comment: one beer or mixed drink a day in warmer weather     Drug use: Never        Family History:     Family History   Problem Relation Age of Onset     Arthritis Mother      Cerebrovascular Disease Father      Diabetes Sister         Medications:     Current Outpatient Medications   Medication Sig     aspirin (ASA) 81 MG EC tablet Take 81 mg by mouth 2 times daily (with meals)     atenolol (TENORMIN) 25 MG tablet Take 0.5 tablets (12.5 mg) by mouth daily     cetirizine (ZYRTEC) 10 MG tablet Take 10 mg by mouth every evening     furosemide (LASIX) 20 MG tablet Take 20 mg by mouth as needed (Patient not taking: Reported on 2022)     Glucose Blood (BLOOD GLUCOSE TEST STRIPS) STRP by In Vitro route daily     levothyroxine (SYNTHROID/LEVOTHROID) 137 MCG tablet Take 1 tablet (137 mcg) by mouth daily Hold until patient calls for refill.     lisinopril (ZESTRIL) 20 MG tablet Take 1.5 tablets (30 mg) by mouth daily     loratadine (CLARITIN) 10 MG tablet Take 10 mg by mouth     No current facility-administered medications for this visit.        Allergies:     Allergies   Allergen Reactions     Amoxicillin GI Disturbance     N, V,  D     when     took   for   dental prophylaxis      Seasonal Allergies      Simvastatin Muscle Pain (Myalgia)        Review of Systems:   As above     Physical Exam:   Vitals: BP (!) 150/84 (BP  Location: Right arm, Patient Position: Sitting, Cuff Size: Adult Large)   Pulse 69   Wt 113.9 kg (251 lb)   SpO2 97%   BMI 35.51 kg/m     General: Seated comfortably in no acute distress.  HEENT: Optic discs sharp on funduscopic exam.   Lungs: breathing comfortably  Neurologic:     Mental Status: Fully alert, attentive. Normal memory and fund of knowledge. Language normal, speech clear and fluent, no paraphasic errors.     Cranial Nerves: Visual fields intact. PERRL. EOMI with normal smooth pursuit. Facial sensation intact/symmetric. Incomplete right eye closure, very mild right eye brow raise, complete right lower facial weakness, normal on the left side. Hearing not formally tested but intact to conversation. Palate elevation symmetric, uvula midline. No dysarthria. Shoulder shrug strong bilaterally. Tongue protrusion midline.     Motor: No tremors or other abnormal movements observed. Muscle tone normal throughout. Normal/symmetric rapid finger tapping. Strength 5/5 throughout upper and lower extremities.     Deep Tendon Reflexes: 1+/symmetric throughout upper and lower extremities with exception of absent left patella reflex. No clonus.      Sensory: Intact/symmetric to light touch throughout upper and lower extremities. Negative Romberg.      Coordination: Finger-nose-finger and heel-shin intact without dysmetria. Rapid alternating movements intact/symmetric with normal speed and rhythm.     Gait: Mildly wide based, but steady casual gait. Able to walk on toes, heels with mild difficulty. Not able to tandem.         Data: Pertinent prior to visit   Imaging:  CTA H/N 11/2022  IMPRESSION:   1. Patent arteries in the neck without evidence of dissection.  Atherosclerotic disease at the carotid arteries bilaterally right  greater than left. There is approximately 50% stenosis of the right  carotid artery. No stenosis of the left carotid artery.  2. Patent proximal major intracranial arteries without  vascular  cutoff. No significant stenosis. No aneurysm identified.    Procedures:  None    Laboratory:  None         Assessment and Plan:   Assessment:  Anil Gutierres Jr. is a 76 year old male who presents today for evaluation of right facial weakness. Presentation and examination is suggestive of Bell's palsy. We discussed that if he has no improvement in 3 months, he should reach out and MRI brain should be considered. Lyme antibody was added on today to recent labs. He should continue artifical tears and right eye lubricant to prevent eye dryness. We discussed the good prognosis of treated Bell's palsy. We also discussed possible late complications of Bell's palsy to watch out for including hemifacial spasm and facial synkinesis.      Plan:  - Lyme Ab  - Call if no improvement of symptoms in 3 months or new symptoms develop    Follow up in Neurology clinic as needed should new concerns arise.    Dilan Simms MD   of Neurology  [facility]      The total time of this encounter today amounted to 46 minutes. This time included time spent with the patient, prep work, ordering tests, and performing post visit documentation.

## 2022-12-09 ENCOUNTER — OFFICE VISIT (OUTPATIENT)
Dept: NEUROLOGY | Facility: CLINIC | Age: 77
End: 2022-12-09
Payer: COMMERCIAL

## 2022-12-09 ENCOUNTER — PRE VISIT (OUTPATIENT)
Dept: NEUROLOGY | Facility: CLINIC | Age: 77
End: 2022-12-09

## 2022-12-09 VITALS
DIASTOLIC BLOOD PRESSURE: 84 MMHG | SYSTOLIC BLOOD PRESSURE: 150 MMHG | HEART RATE: 69 BPM | OXYGEN SATURATION: 97 % | BODY MASS INDEX: 35.51 KG/M2 | WEIGHT: 251 LBS

## 2022-12-09 DIAGNOSIS — G51.0 BELL PALSY: Primary | ICD-10-CM

## 2022-12-09 PROCEDURE — 99204 OFFICE O/P NEW MOD 45 MIN: CPT | Performed by: INTERNAL MEDICINE

## 2022-12-09 NOTE — LETTER
12/9/2022       RE: Anil Gutierres Jr.  [address] [facility] [address]     Dear Colleague,    Thank you for referring your patient, Anil Gutierres Jr., to the [facility] [place] [facility]. Please see a copy of my visit note below.    [facility] Neurology Consultation    Anil Gutierres Jr. MRN# [id number]   Age: 76 year old YOB: 1945     Requesting physician: Gilberto Hernandez     Reason for Consultation: right facial weakness      History of Presenting Symptoms:   Anil Gutierres Jr. is a 76 year old male who presents today for evaluation of right facial weakness.     Patient developed acute right sided facial weakness on 11/25/2022. He presented to ER and examination was thought to be consistent with Bell's palsy. He has since completed week of treatment with prednisone. He hasn't noticed any clear improvement since onset. He is not able to completely close right eye. He has been using artificial tears and lubricant, which has helped prevent dryness. He also has noticed changes in taste. He denies any facial pain, headaches, numbness, right arm/leg symptoms, hearing loss, vertigo. He feels like maybe his balance is a little more off, but he had balance issues previously as well.       Past Medical History:     Patient Active Problem List   Diagnosis     Stage 3a chronic kidney disease (H)     Coronary artery disease involving native heart without angina pectoris, unspecified vessel or lesion type     Controlled type 2 diabetes mellitus without complication, without long-term current use of insulin (H)     Uncontrolled hypertension     Hypothyroidism, unspecified type     Morbid obesity (H)     Past Medical History:   Diagnosis Date     Arthritis 2018    L knee, R hip     Basal cell carcinoma      Coronary artery disease involving native heart without angina pectoris, unspecified vessel or lesion  type 2022     Diabetes (H)     Type 2     Hypertension      Thyroid disease     Hypothyroidism        Past Surgical History:     Past Surgical History:   Procedure Laterality Date     BIOPSY      Tongue     COLONOSCOPY  2017     COLONOSCOPY N/A 2022    Procedure: COLONOSCOPY;  Surgeon: Greer White MD;  Location: [facility]     EYE SURGERY  2019    cataracts     GLOSSECTOMY PARTIAL Right 2021    Procedure: GLOSSECTOMY, PARTIAL;  Surgeon: Rojelio Horton MD;  Location: [facility]     ORTHOPEDIC SURGERY  dec  23, 2020    R Hip        Social History:     Social History     Tobacco Use     Smoking status: Former     Packs/day: 1.00     Years: 20.00     Pack years: 20.00     Types: Cigarettes     Start date: 1964     Quit date: 1984     Years since quittin.9     Smokeless tobacco: Never   Vaping Use     Vaping Use: Never used   Substance Use Topics     Alcohol use: Yes     Comment: one beer or mixed drink a day in warmer weather     Drug use: Never        Family History:     Family History   Problem Relation Age of Onset     Arthritis Mother      Cerebrovascular Disease Father      Diabetes Sister         Medications:     Current Outpatient Medications   Medication Sig     aspirin (ASA) 81 MG EC tablet Take 81 mg by mouth 2 times daily (with meals)     atenolol (TENORMIN) 25 MG tablet Take 0.5 tablets (12.5 mg) by mouth daily     cetirizine (ZYRTEC) 10 MG tablet Take 10 mg by mouth every evening     furosemide (LASIX) 20 MG tablet Take 20 mg by mouth as needed (Patient not taking: Reported on 2022)     Glucose Blood (BLOOD GLUCOSE TEST STRIPS) STRP by In Vitro route daily     levothyroxine (SYNTHROID/LEVOTHROID) 137 MCG tablet Take 1 tablet (137 mcg) by mouth daily Hold until patient calls for refill.     lisinopril (ZESTRIL) 20 MG tablet Take 1.5 tablets (30 mg) by mouth daily     loratadine (CLARITIN) 10 MG tablet Take 10 mg by mouth     No current facility-administered  medications for this visit.        Allergies:     Allergies   Allergen Reactions     Amoxicillin GI Disturbance     N, V,  D   6/21  when     took   for   dental prophylaxis      Seasonal Allergies      Simvastatin Muscle Pain (Myalgia)        Review of Systems:   As above     Physical Exam:   Vitals: BP (!) 150/84 (BP Location: Right arm, Patient Position: Sitting, Cuff Size: Adult Large)   Pulse 69   Wt 113.9 kg (251 lb)   SpO2 97%   BMI 35.51 kg/m     General: Seated comfortably in no acute distress.  HEENT: Optic discs sharp on funduscopic exam.   Lungs: breathing comfortably  Neurologic:     Mental Status: Fully alert, attentive. Normal memory and fund of knowledge. Language normal, speech clear and fluent, no paraphasic errors.     Cranial Nerves: Visual fields intact. PERRL. EOMI with normal smooth pursuit. Facial sensation intact/symmetric. Incomplete right eye closure, very mild right eye brow raise, complete right lower facial weakness, normal on the left side. Hearing not formally tested but intact to conversation. Palate elevation symmetric, uvula midline. No dysarthria. Shoulder shrug strong bilaterally. Tongue protrusion midline.     Motor: No tremors or other abnormal movements observed. Muscle tone normal throughout. Normal/symmetric rapid finger tapping. Strength 5/5 throughout upper and lower extremities.     Deep Tendon Reflexes: 1+/symmetric throughout upper and lower extremities with exception of absent left patella reflex. No clonus.      Sensory: Intact/symmetric to light touch throughout upper and lower extremities. Negative Romberg.      Coordination: Finger-nose-finger and heel-shin intact without dysmetria. Rapid alternating movements intact/symmetric with normal speed and rhythm.     Gait: Mildly wide based, but steady casual gait. Able to walk on toes, heels with mild difficulty. Not able to tandem.         Data: Pertinent prior to visit   Imaging:  CTA H/N 11/2022  IMPRESSION:   1.  Patent arteries in the neck without evidence of dissection.  Atherosclerotic disease at the carotid arteries bilaterally right  greater than left. There is approximately 50% stenosis of the right  carotid artery. No stenosis of the left carotid artery.  2. Patent proximal major intracranial arteries without vascular  cutoff. No significant stenosis. No aneurysm identified.    Procedures:  None    Laboratory:  None         Assessment and Plan:   Assessment:  Anil Gutierres Jr. is a 76 year old male who presents today for evaluation of right facial weakness. Presentation and examination is suggestive of Bell's palsy. We discussed that if he has no improvement in 3 months, he should reach out and MRI brain should be considered. Lyme antibody was added on today to recent labs. He should continue artifical tears and right eye lubricant to prevent eye dryness. We discussed the good prognosis of treated Bell's palsy. We also discussed possible late complications of Bell's palsy to watch out for including hemifacial spasm and facial synkinesis.      Plan:  - Lyme Ab  - Call if no improvement of symptoms in 3 months or new symptoms develop    Follow up in Neurology clinic as needed should new concerns arise.    Dilan Simms MD   of Neurology  [facility]      The total time of this encounter today amounted to 46 minutes. This time included time spent with the patient, prep work, ordering tests, and performing post visit documentation.

## 2022-12-09 NOTE — NURSING NOTE
Chief Complaint   Patient presents with     Consult     Bell palsy newly Dx. One day after thanksdoug Coker CMA at 3:39 PM on 12/9/2022.

## 2022-12-12 ENCOUNTER — TELEPHONE (OUTPATIENT)
Dept: NEUROLOGY | Facility: CLINIC | Age: 77
End: 2022-12-12

## 2022-12-12 ENCOUNTER — CARE COORDINATION (OUTPATIENT)
Dept: NEUROLOGY | Facility: CLINIC | Age: 77
End: 2022-12-12

## 2022-12-12 NOTE — TELEPHONE ENCOUNTER
Called [facility]  lab and was informed to Call [place] lab at [phone].    Called [facility] and spoke to Tia who stated okay to add on Lyme Disease and fax order to [phone]    Lab order was faxed and I called Tia to let her know lab order add on was faxed and she stated she already input the order.

## 2022-12-13 LAB — B BURGDOR IGG+IGM SER QL: 0.12

## 2022-12-14 ENCOUNTER — ALLIED HEALTH/NURSE VISIT (OUTPATIENT)
Dept: FAMILY MEDICINE | Facility: CLINIC | Age: 77
End: 2022-12-14
Payer: COMMERCIAL

## 2022-12-14 ENCOUNTER — TELEPHONE (OUTPATIENT)
Dept: FAMILY MEDICINE | Facility: CLINIC | Age: 77
End: 2022-12-14

## 2022-12-14 VITALS — SYSTOLIC BLOOD PRESSURE: 155 MMHG | DIASTOLIC BLOOD PRESSURE: 77 MMHG | HEART RATE: 50 BPM

## 2022-12-14 DIAGNOSIS — I10 UNCONTROLLED HYPERTENSION: Primary | ICD-10-CM

## 2022-12-14 DIAGNOSIS — I10 UNCONTROLLED HYPERTENSION: ICD-10-CM

## 2022-12-14 PROCEDURE — 99207 PR NO CHARGE NURSE ONLY: CPT

## 2022-12-14 RX ORDER — LISINOPRIL 40 MG/1
40 TABLET ORAL DAILY
Qty: 90 TABLET | Refills: 3 | Status: SHIPPED | OUTPATIENT
Start: 2022-12-14 | End: 2023-11-08

## 2022-12-14 NOTE — TELEPHONE ENCOUNTER
Increase lisinopril to 40 mg daily  Continue other meds with no change.  Be consistent with sodium restrictions.  Recheck BP in 1 month with a RN visit.

## 2022-12-14 NOTE — PROGRESS NOTES
Anil Gutierres . is a 76 year old patient who comes in today for a Blood Pressure check because of ongoing blood pressure monitoring.  Vital Signs as repeated by /86, p-52 manual, 155/77, p-50 with our machine.  Patient is taking medication as prescribed  Patient is tolerating medications well.  Patient is monitoring Blood Pressure at home.  Average readings if yes are 147/73  Current complaints: none  Disposition:  Pt will check and record BP everyday as he only checks it occasionally.  He will avoid high sodium foods and increase his physical exercise.

## 2023-01-05 ENCOUNTER — LAB (OUTPATIENT)
Dept: LAB | Facility: CLINIC | Age: 78
End: 2023-01-05
Payer: COMMERCIAL

## 2023-01-05 DIAGNOSIS — I25.10 CORONARY ARTERY DISEASE INVOLVING NATIVE HEART WITHOUT ANGINA PECTORIS, UNSPECIFIED VESSEL OR LESION TYPE: ICD-10-CM

## 2023-01-05 DIAGNOSIS — Z13.220 SCREENING FOR HYPERLIPIDEMIA: ICD-10-CM

## 2023-01-05 DIAGNOSIS — R31.29 MICROSCOPIC HEMATURIA: ICD-10-CM

## 2023-01-05 DIAGNOSIS — N18.31 STAGE 3A CHRONIC KIDNEY DISEASE (H): ICD-10-CM

## 2023-01-05 LAB
ALBUMIN UR-MCNC: NEGATIVE MG/DL
APPEARANCE UR: CLEAR
BILIRUB UR QL STRIP: NEGATIVE
CHOLEST SERPL-MCNC: 190 MG/DL
COLOR UR AUTO: YELLOW
CREAT UR-MCNC: 92.5 MG/DL
GLUCOSE UR STRIP-MCNC: NEGATIVE MG/DL
HDLC SERPL-MCNC: 41 MG/DL
HGB UR QL STRIP: NEGATIVE
KETONES UR STRIP-MCNC: NEGATIVE MG/DL
LDLC SERPL CALC-MCNC: 123 MG/DL
LEUKOCYTE ESTERASE UR QL STRIP: NEGATIVE
MICROALBUMIN UR-MCNC: 39.8 MG/L
MICROALBUMIN/CREAT UR: 43.03 MG/G CR (ref 0–17)
NITRATE UR QL: NEGATIVE
NONHDLC SERPL-MCNC: 149 MG/DL
PH UR STRIP: 5.5 [PH] (ref 5–7)
SP GR UR STRIP: 1.02 (ref 1–1.03)
TRIGL SERPL-MCNC: 132 MG/DL
UROBILINOGEN UR STRIP-ACNC: 0.2 E.U./DL

## 2023-01-05 PROCEDURE — 82570 ASSAY OF URINE CREATININE: CPT

## 2023-01-05 PROCEDURE — 36415 COLL VENOUS BLD VENIPUNCTURE: CPT

## 2023-01-05 PROCEDURE — 80061 LIPID PANEL: CPT

## 2023-01-05 PROCEDURE — 82043 UR ALBUMIN QUANTITATIVE: CPT

## 2023-01-05 PROCEDURE — 81003 URINALYSIS AUTO W/O SCOPE: CPT

## 2023-01-26 ENCOUNTER — OFFICE VISIT (OUTPATIENT)
Dept: FAMILY MEDICINE | Facility: CLINIC | Age: 78
End: 2023-01-26
Payer: COMMERCIAL

## 2023-01-26 ENCOUNTER — OFFICE VISIT (OUTPATIENT)
Dept: DERMATOLOGY | Facility: CLINIC | Age: 78
End: 2023-01-26
Payer: COMMERCIAL

## 2023-01-26 VITALS
OXYGEN SATURATION: 99 % | BODY MASS INDEX: 35.53 KG/M2 | HEIGHT: 71 IN | HEART RATE: 56 BPM | TEMPERATURE: 96.9 F | SYSTOLIC BLOOD PRESSURE: 132 MMHG | RESPIRATION RATE: 16 BRPM | DIASTOLIC BLOOD PRESSURE: 68 MMHG | WEIGHT: 253.8 LBS

## 2023-01-26 DIAGNOSIS — E11.22 TYPE 2 DIABETES MELLITUS WITH STAGE 3 CHRONIC KIDNEY DISEASE, WITHOUT LONG-TERM CURRENT USE OF INSULIN, UNSPECIFIED WHETHER STAGE 3A OR 3B CKD (H): ICD-10-CM

## 2023-01-26 DIAGNOSIS — L82.1 SEBORRHEIC KERATOSIS: ICD-10-CM

## 2023-01-26 DIAGNOSIS — E66.01 MORBID OBESITY (H): ICD-10-CM

## 2023-01-26 DIAGNOSIS — L98.9 SKIN LESION OF NECK: ICD-10-CM

## 2023-01-26 DIAGNOSIS — N18.30 TYPE 2 DIABETES MELLITUS WITH STAGE 3 CHRONIC KIDNEY DISEASE, WITHOUT LONG-TERM CURRENT USE OF INSULIN, UNSPECIFIED WHETHER STAGE 3A OR 3B CKD (H): ICD-10-CM

## 2023-01-26 DIAGNOSIS — N18.31 STAGE 3A CHRONIC KIDNEY DISEASE (H): ICD-10-CM

## 2023-01-26 DIAGNOSIS — Z85.828 HISTORY OF BASAL CELL CARCINOMA (BCC): ICD-10-CM

## 2023-01-26 DIAGNOSIS — D48.5 NEOPLASM OF UNCERTAIN BEHAVIOR OF SKIN: ICD-10-CM

## 2023-01-26 DIAGNOSIS — I10 BENIGN ESSENTIAL HYPERTENSION: Primary | ICD-10-CM

## 2023-01-26 DIAGNOSIS — L57.0 ACTINIC KERATOSIS: Primary | ICD-10-CM

## 2023-01-26 PROCEDURE — 99207 PR FOOT EXAM NO CHARGE: CPT | Performed by: FAMILY MEDICINE

## 2023-01-26 PROCEDURE — 88305 TISSUE EXAM BY PATHOLOGIST: CPT | Mod: 59 | Performed by: DERMATOLOGY

## 2023-01-26 PROCEDURE — 11102 TANGNTL BX SKIN SINGLE LES: CPT | Performed by: PHYSICIAN ASSISTANT

## 2023-01-26 PROCEDURE — 11103 TANGNTL BX SKIN EA SEP/ADDL: CPT | Performed by: PHYSICIAN ASSISTANT

## 2023-01-26 PROCEDURE — 99213 OFFICE O/P EST LOW 20 MIN: CPT | Mod: 25 | Performed by: PHYSICIAN ASSISTANT

## 2023-01-26 PROCEDURE — 17000 DESTRUCT PREMALG LESION: CPT | Mod: 59 | Performed by: PHYSICIAN ASSISTANT

## 2023-01-26 PROCEDURE — 99214 OFFICE O/P EST MOD 30 MIN: CPT | Performed by: FAMILY MEDICINE

## 2023-01-26 RX ORDER — ATENOLOL 25 MG/1
12.5 TABLET ORAL DAILY
Qty: 45 TABLET | Refills: 3 | Status: SHIPPED | OUTPATIENT
Start: 2023-01-26 | End: 2024-06-20

## 2023-01-26 ASSESSMENT — PAIN SCALES - GENERAL
PAINLEVEL: NO PAIN (0)
PAINLEVEL: MILD PAIN (3)

## 2023-01-26 NOTE — PATIENT INSTRUCTIONS
Wound Care Instructions     FOR SUPERFICIAL WOUNDS     [facility]  [phone]                 AFTER 24 HOURS YOU SHOULD REMOVE THE BANDAGE AND BEGIN DAILY DRESSING CHANGES AS FOLLOWS:     1) Remove Dressing.     2) Clean and dry the area with tap water using a Q-tip or sterile gauze pad.     3) Apply Vaseline, Aquaphor, Polysporin ointment or Bacitracin ointment over entire wound.  Do NOT use Neosporin ointment.     4) Cover the wound with a band-aid, or a sterile non-stick gauze pad and micropore paper tape    REPEAT THESE INSTRUCTIONS AT LEAST ONCE A DAY UNTIL THE WOUND HAS COMPLETELY HEALED.    It is an old wives tale that a wound heals better when it is exposed to air and allowed to dry out. The wound will heal faster with a better cosmetic result if it is kept moist with ointment and covered with a bandage.    **Do not let the wound dry out.**    Supplies Needed:      *Cotton tipped applicators (Q-tips)    *Vaseline, Aquaphor, Polysporin or Bacitracin Ointment (NOT NEOSPORIN)    *Band-aids or non-stick gauze pads and micropore paper tape.      PATIENT INFORMATION:    During the healing process you will notice a number of changes. All wounds develop a small halo of redness surrounding the wound.  This means healing is occurring. Severe itching with extensive redness usually indicates sensitivity to the ointment or bandage tape used to dress the wound.  You should call our office if this develops.      Swelling  and/or discoloration around your surgical site is common, particularly when performed around the eye.    All wounds normally drain.  The larger the wound the more drainage there will be.  After 7-10 days, you will notice the wound beginning to shrink and new skin will begin to grow.  The wound is healed when you can see skin has formed over the entire area.  A healed wound has a healthy, shiny look to the surface and is red to dark pink in color to normalize.  Wounds may  take approximately 4-6 weeks to heal.  Larger wounds may take 6-8 weeks.  After the wound is healed you may discontinue dressing changes.    You may experience a sensation of tightness as your wound heals. This is normal and will gradually subside.    Your healed wound may be sensitive to temperature changes. This sensitivity improves with time, but if you re having a lot of discomfort, try to avoid temperature extremes.    Patients frequently experience itching after their wound appears to have healed because of the continue healing under the skin.  Plain Vaseline will help relieve the itching.      POSSIBLE COMPLICATIONS    BLEEDING:    Leave the bandage in place.  Use tightly rolled up gauze or a cloth to apply direct pressure over the bandage for 30  minutes.  Reapply pressure for an additional 30 minutes if necessary  Use additional gauze and tape to maintain pressure once the bleeding has stopped.

## 2023-01-26 NOTE — PATIENT INSTRUCTIONS
Continue all medications unchanged.    Be consistent with low salt, low trans fat and low saturated fat diet.  Eat food rich in omega-3-fatty acids as you tolerate. (salmon, olive oil)  Eat 5 cups of vegetables, fruits and whole grains per day.  Limit starchy food (white rice, white bread, white pasta, white potatoes) to less than a cup per meal.  Minimize sweets, junk food and fastfood. Limit soda beverages to one serving per day; best to avoid it altogether though.    Exercise: moderate intensity sustained for at least 30 mins per episode, goal of 150 mins per week at least  Combine cardiovascular and resistance exercises.  These exercise recommendations are in addition to your daily activity at work or home.  Work on losing weight.    Regular foot care; don't walk barefoot  Annual eye exam.  Please request your eye doctor to furnish a copy of the eye exam report to the clinic to be placed in your record.  Flu vaccine by the end of October yearly.  Be sure to update any other recommended vaccinations for diabetics.    Schedule dermatology consult for the skin lesion on the right side of the neck.

## 2023-01-26 NOTE — PROGRESS NOTES
Assessment & Plan     Benign essential hypertension  Controlled.  Low salt, low fat diet.   Exercise as tolerated.  Take meds as prescribed; call if with side effects.   - atenolol (TENORMIN) 25 MG tablet  Dispense: 45 tablet; Refill: 3    Skin lesion of neck  Non-healing over 6-7 months.  Suspect skin cancer. Referred to derm for definitive management.  - Adult Dermatology Referral    Morbid obesity (H)  This increases complexity in managing his chronic medical conditions above.  Patient was advised healthy diet recommendations.  Patient was advised weekly exercise recommendations and goals.     Stage 3a chronic kidney disease (H)  Stable renal function on recent labs.  Maintain hydration. Avoid nephrotoxins.    Type 2 diabetes mellitus with stage 3 chronic kidney disease, without long-term current use of insulin, unspecified whether stage 3a or 3b CKD (H)  - FOOT EXAM         Patient Instructions   Continue all medications unchanged.    Be consistent with low salt, low trans fat and low saturated fat diet.  Eat food rich in omega-3-fatty acids as you tolerate. (salmon, olive oil)  Eat 5 cups of vegetables, fruits and whole grains per day.  Limit starchy food (white rice, white bread, white pasta, white potatoes) to less than a cup per meal.  Minimize sweets, junk food and fastfood. Limit soda beverages to one serving per day; best to avoid it altogether though.    Exercise: moderate intensity sustained for at least 30 mins per episode, goal of 150 mins per week at least  Combine cardiovascular and resistance exercises.  These exercise recommendations are in addition to your daily activity at work or home.  Work on losing weight.    Regular foot care; don't walk barefoot  Annual eye exam.  Please request your eye doctor to furnish a copy of the eye exam report to the clinic to be placed in your record.  Flu vaccine by the end of October yearly.  Be sure to update any other recommended vaccinations for  "diabetics.    Schedule dermatology consult for the skin lesion on the right side of the neck.      Return in about 8 months (around 10/2/2023) for In-clinic visit for wellness visit.    Gilberto Alonzo MD  [facility] is a 77 year old, presenting for the following health issues:  Hypertension      History of Present Illness       Hypertension: He presents for follow up of hypertension.  He does check blood pressure  regularly outside of the clinic. Outpatient blood pressures have not been over 140/90. He follows a low salt diet.       Denies chest pain, dyspnea, HA, BOV, dizziness or urinary changes.  Patient reports tolerating medications well.    Skin lesion on right side of the neck:  - 5-6 months present  - scabbing recurrently  - initially hardly noticeable until it started scabbing  - sometimes sore, never itchy  - rubbing on collar irritates it  - tried to apply topical ointment from derm - no improvement.      How many servings of fruits and vegetables do you eat daily?  2-3    On average, how many sweetened beverages do you drink each day (Examples: soda, juice, sweet tea, etc.  Do NOT count diet or artificially sweetened beverages)?   0    How many days per week do you exercise enough to make your heart beat faster? 4    How many minutes a day do you exercise enough to make your heart beat faster? 30 - 60    How many days per week do you miss taking your medication? 0    Type 2 DM: due for foot exam.    Review of Systems   Constitutional, HEENT, cardiovascular, pulmonary, GI, , musculoskeletal, neuro, skin, endocrine and psych systems are negative, except as otherwise noted.      Objective    /68   Pulse 56   Temp 96.9  F (36.1  C) (Tympanic)   Resp 16   Ht 1.803 m (5' 11\")   Wt 115.1 kg (253 lb 12.8 oz)   SpO2 99%   BMI 35.40 kg/m    Body mass index is 35.4 kg/m .  Physical Exam   GENERAL:  alert and no distress, ambulatory w/o assist  NECK: no " tenderness, no adenopathy,  Thyroid not enlarged  RESP: lungs clear to auscultation - no rales, no rhonchi, no wheezes  CV: regular rates and rhythm, no murmur  MS: no edema  SKIN: 1 cm x 1 cm irregularly shaped flat papule with creamy white area on part of it but no visible telangiectasias  NEURO: no tremors  ABD:  Protuberant    No results found for any visits on 01/26/23.

## 2023-01-26 NOTE — PROGRESS NOTES
HPI:   Chief complaints: Anil Gutierres Jr. is a pleasant 77 year old male who presents for evaluation of a spot of concern on the right neck. The area has been there for several months and is tender. He also has a pimple like spot on the right nostril and a scaly spot on the nose. He does have a history of BCC in the past.       PHYSICAL EXAM:    There were no vitals taken for this visit.  Skin exam performed as follows: Type 2 skin. Mood appropriate  Alert and Oriented X 3. Well developed, well nourished in no distress.  General appearance: Normal  Head including face: Normal  Eyes: conjunctiva and lids: Normal  Mouth: Lips, teeth, gums: Normal  Neck: Normal  Cardiovascular: Exam of peripheral vascular system by observation for swelling, varicosities, edema: Normal  Right upper extremity: Normal  Left upper extremity: Normal  Right lower extremity: Normal  Left lower extremity: Normal  Skin: Scalp and body hair: See below    1. 5 mm pink depressed macule on the right ala  2.  8 mm pink hyperkeratotic papule on the right neck  3. Pink gritty papule on the left nasal bridge x 1  4. Keratotic papules on the back      ASSESSMENT/PLAN:     1. R/o BCC on the right ala. Shave biopsy performed.  Area cleaned and anesthetized with 1% lidocaine with epinephrine.  Dermablade used to remove the lesion and sent to pathology. Bleeding was cauterized. Pt tolerated procedure well with no complications.   2. R/o SCC on the right neck. Shave biopsy performed.  Area cleaned and anesthetized with 1% lidocaine with epinephrine.  Dermablade used to remove the lesion and sent to pathology. Bleeding was cauterized. Pt tolerated procedure well with no complications.   3. Actinic keratosis on the left nasal bridge x 1. As precancerous, cryosurgery performed. Advised on blistering and post-op care. Advised if not resolved in 1-2 months to return for evaluation  4. Seborrheic keratoses on the back - advised benign no treatment needed.            Follow-up: pending path  CC:   Scribed By: Yecenia Donohue, MS, PA-C

## 2023-01-26 NOTE — LETTER
1/26/2023         RE: Anil Gutierres Jr.  [address] [facility] [id number]        Dear Colleague,    Thank you for referring your patient, Anil Gutierres Jr., to the [facility]. Please see a copy of my visit note below.    HPI:   Chief complaints: Anil Gutierres Jr. is a pleasant 77 year old male who presents for evaluation of a spot of concern on the right neck. The area has been there for several months and is tender. He also has a pimple like spot on the right nostril and a scaly spot on the nose. He does have a history of BCC in the past.       PHYSICAL EXAM:    There were no vitals taken for this visit.  Skin exam performed as follows: Type 2 skin. Mood appropriate  Alert and Oriented X 3. Well developed, well nourished in no distress.  General appearance: Normal  Head including face: Normal  Eyes: conjunctiva and lids: Normal  Mouth: Lips, teeth, gums: Normal  Neck: Normal  Cardiovascular: Exam of peripheral vascular system by observation for swelling, varicosities, edema: Normal  Right upper extremity: Normal  Left upper extremity: Normal  Right lower extremity: Normal  Left lower extremity: Normal  Skin: Scalp and body hair: See below    1. 5 mm pink depressed macule on the right ala  2.  8 mm pink hyperkeratotic papule on the right neck  3. Pink gritty papule on the left nasal bridge x 1  4. Keratotic papules on the back      ASSESSMENT/PLAN:     1. R/o BCC on the right ala. Shave biopsy performed.  Area cleaned and anesthetized with 1% lidocaine with epinephrine.  Dermablade used to remove the lesion and sent to pathology. Bleeding was cauterized. Pt tolerated procedure well with no complications.   2. R/o SCC on the right neck. Shave biopsy performed.  Area cleaned and anesthetized with 1% lidocaine with epinephrine.  Dermablade used to remove the lesion and sent to pathology. Bleeding was cauterized. Pt tolerated procedure well with no complications.   3. Actinic  keratosis on the left nasal bridge x 1. As precancerous, cryosurgery performed. Advised on blistering and post-op care. Advised if not resolved in 1-2 months to return for evaluation  4. Seborrheic keratoses on the back - advised benign no treatment needed.           Follow-up: pending path  CC:   Scribed By: Yecenia Donohue MS, GIOVANI          Again, thank you for allowing me to participate in the care of your patient.        Sincerely,        Yecenia Donohue PA-C

## 2023-01-30 ENCOUNTER — TELEPHONE (OUTPATIENT)
Dept: DERMATOLOGY | Facility: CLINIC | Age: 78
End: 2023-01-30
Payer: COMMERCIAL

## 2023-01-30 LAB
PATH REPORT.COMMENTS IMP SPEC: ABNORMAL
PATH REPORT.COMMENTS IMP SPEC: ABNORMAL
PATH REPORT.COMMENTS IMP SPEC: YES
PATH REPORT.FINAL DX SPEC: ABNORMAL
PATH REPORT.GROSS SPEC: ABNORMAL
PATH REPORT.MICROSCOPIC SPEC OTHER STN: ABNORMAL
PATH REPORT.RELEVANT HX SPEC: ABNORMAL

## 2023-02-20 ENCOUNTER — E-VISIT (OUTPATIENT)
Dept: FAMILY MEDICINE | Facility: CLINIC | Age: 78
End: 2023-02-20
Payer: COMMERCIAL

## 2023-02-20 DIAGNOSIS — H91.91 DECREASED HEARING, RIGHT: Primary | ICD-10-CM

## 2023-02-20 PROCEDURE — 99207 PR NON-BILLABLE SERV PER CHARTING: CPT | Performed by: FAMILY MEDICINE

## 2023-02-20 NOTE — PATIENT INSTRUCTIONS
Thank you for choosing us for your care. I think an in-clinic visit would be best next steps based on your symptoms. Please schedule a clinic appointment; you won t be charged for this eVisit.      You can schedule an appointment right here in [facility], or call [phone]

## 2023-02-20 NOTE — TELEPHONE ENCOUNTER
Please call patient and assist in scheduling an in-clinic appointment for assessment of his right ear and hearing.  May use same day appointment slot.

## 2023-02-28 DIAGNOSIS — E03.9 HYPOTHYROIDISM, UNSPECIFIED TYPE: ICD-10-CM

## 2023-03-01 RX ORDER — LEVOTHYROXINE SODIUM 137 UG/1
TABLET ORAL
Qty: 90 TABLET | Refills: 3 | OUTPATIENT
Start: 2023-03-01

## 2023-03-03 ENCOUNTER — MYC MEDICAL ADVICE (OUTPATIENT)
Dept: FAMILY MEDICINE | Facility: CLINIC | Age: 78
End: 2023-03-03
Payer: COMMERCIAL

## 2023-03-08 DIAGNOSIS — E03.9 HYPOTHYROIDISM, UNSPECIFIED TYPE: ICD-10-CM

## 2023-03-08 RX ORDER — LEVOTHYROXINE SODIUM 137 UG/1
TABLET ORAL
Qty: 90 TABLET | Refills: 2 | Status: SHIPPED | OUTPATIENT
Start: 2023-03-08 | End: 2023-12-04

## 2023-03-24 ENCOUNTER — OFFICE VISIT (OUTPATIENT)
Dept: FAMILY MEDICINE | Facility: CLINIC | Age: 78
End: 2023-03-24
Payer: COMMERCIAL

## 2023-03-24 VITALS
TEMPERATURE: 97.3 F | BODY MASS INDEX: 37.25 KG/M2 | OXYGEN SATURATION: 100 % | HEART RATE: 52 BPM | RESPIRATION RATE: 16 BRPM | SYSTOLIC BLOOD PRESSURE: 122 MMHG | WEIGHT: 251.5 LBS | HEIGHT: 69 IN | DIASTOLIC BLOOD PRESSURE: 80 MMHG

## 2023-03-24 DIAGNOSIS — E11.22 TYPE 2 DIABETES MELLITUS WITH STAGE 3 CHRONIC KIDNEY DISEASE, WITHOUT LONG-TERM CURRENT USE OF INSULIN, UNSPECIFIED WHETHER STAGE 3A OR 3B CKD (H): ICD-10-CM

## 2023-03-24 DIAGNOSIS — H93.A1 PULSATILE TINNITUS OF RIGHT EAR: Primary | ICD-10-CM

## 2023-03-24 DIAGNOSIS — N18.30 TYPE 2 DIABETES MELLITUS WITH STAGE 3 CHRONIC KIDNEY DISEASE, WITHOUT LONG-TERM CURRENT USE OF INSULIN, UNSPECIFIED WHETHER STAGE 3A OR 3B CKD (H): ICD-10-CM

## 2023-03-24 DIAGNOSIS — I65.21 CAROTID STENOSIS, RIGHT: ICD-10-CM

## 2023-03-24 PROCEDURE — 99213 OFFICE O/P EST LOW 20 MIN: CPT | Performed by: FAMILY MEDICINE

## 2023-03-24 ASSESSMENT — PAIN SCALES - GENERAL: PAINLEVEL: NO PAIN (0)

## 2023-03-24 NOTE — PATIENT INSTRUCTIONS
Follow up with Audiology for hearing test    Follow up with ENT for further evaluation of muffled hearing and pulsatile noise in ear.

## 2023-03-24 NOTE — PROGRESS NOTES
Assessment & Plan     Pulsatile tinnitus of right ear  Ongoing for last 2 months. No evidence of infection. Wears hearing aids. No fevers. No ear pain. Able to hear but just muffled. Recent CT head and CTA unremarkable other than 50% carotid stenosis on the right.   -- has appt with Audiology and also ENT on 6/8/2023    Type 2 diabetes mellitus with stage 3 chronic kidney disease, without long-term current use of insulin, unspecified whether stage 3a or 3b CKD (H)  Known issue that I take into account for their medical decisions; no current exacerbations or new concerns.       Follow up with ENT.     Cuco Ugarte DO  [facility] BENITA Salvador is a 77 year old, presenting for the following health issues:  Ear Problem (Right ear )  No flowsheet data found.  History of Present Illness       Reason for visit:  Right ear sound is muffled since mid January 23  Symptom onset:  More than a month  Symptoms include:  Right ear has muffled sound  Symptom intensity:  Moderate  Symptom progression:  Staying the same  Had these symptoms before:  No  What makes it worse:  No  What makes it better:  No    He eats 0-1 servings of fruits and vegetables daily.He consumes 1 sweetened beverage(s) daily.He exercises with enough effort to increase his heart rate 20 to 29 minutes per day.  He exercises with enough effort to increase his heart rate 4 days per week.   He is taking medications regularly.       77-year-old male presents to clinic for evaluation for ear concerns    Today in clinic:  Wears hearing aids.   Ears Not painful  Hearing is muffled in right ear.  No ringing in the ears.   Reports can hear his pulse in his right ear.   No issues with the left ear.    Recent Bell's Palsy 11/25/2022    CTA head and neck 11/25/2022  IMPRESSION:   1. Patent arteries in the neck without evidence of dissection.  Atherosclerotic disease at the carotid arteries bilaterally right  greater than left. There is  "approximately 50% stenosis of the right  carotid artery. No stenosis of the left carotid artery.  2. Patent proximal major intracranial arteries without vascular  cutoff. No significant stenosis. No aneurysm identified.    CT head  IMPRESSION:     1. No evidence of acute intracranial hemorrhage, mass, or herniation.  2. Mild nonspecific white matter changes likely due to chronic  microvascular ischemic disease.      Scheduled to see ENT and Audiology on 6/8/2023     Review of Systems   Constitutional, HEENT, cardiovascular, pulmonary, gi and gu systems are negative, except as otherwise noted.      Objective    /80 (BP Location: Right arm, Patient Position: Sitting, Cuff Size: Adult Large)   Pulse 52   Temp 97.3  F (36.3  C) (Tympanic)   Resp 16   Ht 1.765 m (5' 9.49\")   Wt 114.1 kg (251 lb 8 oz)   SpO2 100%   BMI 36.62 kg/m    Body mass index is 36.62 kg/m .  Physical Exam   General: alert, cooperative, no acute distress   HEENT: NC/AT, PERRL, TM's without signs of infection, nares patent, oropharynx clear and non-erythematous.   CV: RRR  Resp: non-labored breathing, clear to auscultation, no wheezing or rales       "

## 2023-05-07 ENCOUNTER — HEALTH MAINTENANCE LETTER (OUTPATIENT)
Age: 78
End: 2023-05-07

## 2023-06-06 ENCOUNTER — TRANSFERRED RECORDS (OUTPATIENT)
Dept: HEALTH INFORMATION MANAGEMENT | Facility: CLINIC | Age: 78
End: 2023-06-06
Payer: COMMERCIAL

## 2023-06-06 NOTE — PROGRESS NOTES
Chief Complaint   Patient presents with     Hearing Problem     Per patient muffled sound in right ear and hearing heart beating in right ear  Here to discus with provider     History of Present Illness  Anil Gutierres Jr. is a 77 year old male who presents today for evaluation.  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 Given this, we went to the OR for definitive excision. The patient went to the operating room and underwent right partial glossectomy on 12/2/2021.  The patient had a normal postoperative course.  There was no postoperative evidence of bleeding.  Final pathology showed focal mild squamous cell dysplasia, without extension to margins. He was seen for follow up on 1/3/2022 and was doing well. He was referred again for decreased hearing an pulsatile tinnitus. He presents today for follow up.     From a symptom standpoint, the patient reports developing right-sided Bell's palsy in November 2022.  He has been having slow improvement in his facial nerve paresis but is still asymmetric on the right.  He does have known asymmetric hearing loss on the right and in January 2023 had sudden change in his hearing with plugging, muffling, decreased hearing.  He is also noticed intermittent pulsatile tinnitus on the right-hand side.  He currently denies any otalgia, otorrhea, bloody otorrhea.  No dizziness or vertigo.  No prior history of ear surgery.  He does wear bilateral hearing aids which [facility]    Past Medical History  Patient Active Problem List   Diagnosis     Stage 3a chronic kidney disease (H)     Coronary artery disease involving native heart without angina pectoris, unspecified vessel or lesion type     Type 2 diabetes mellitus with stage 3 chronic kidney disease, without long-term current use of insulin,  unspecified whether stage 3a or 3b CKD (H)     Uncontrolled hypertension     Hypothyroidism, unspecified type     Morbid obesity (H)     Carotid stenosis, right     Current Medications     Current Outpatient Medications:      aspirin (ASA) 81 MG EC tablet, Take 81 mg by mouth 2 times daily (with meals), Disp: , Rfl:      atenolol (TENORMIN) 25 MG tablet, Take 0.5 tablets (12.5 mg) by mouth daily, Disp: 45 tablet, Rfl: 3     cetirizine (ZYRTEC) 10 MG tablet, Take 10 mg by mouth every evening, Disp: , Rfl:      Glucose Blood (BLOOD GLUCOSE TEST STRIPS) STRP, by In Vitro route daily, Disp: , Rfl:      levothyroxine (SYNTHROID/LEVOTHROID) 137 MCG tablet, TAKE 1 TABLET BY MOUTH DAILY. SCHEDULE LAB APPOINTMENT AND WELLNESS EXAM, Disp: 90 tablet, Rfl: 2     lisinopril (ZESTRIL) 40 MG tablet, Take 1 tablet (40 mg) by mouth daily, Disp: 90 tablet, Rfl: 3     loratadine (CLARITIN) 10 MG tablet, Take 10 mg by mouth, Disp: , Rfl:      furosemide (LASIX) 20 MG tablet, Take 20 mg by mouth as needed, Disp: , Rfl:     Allergies  Allergies   Allergen Reactions     Amoxicillin GI Disturbance and Nausea     N, V,  D   6  when     took   for   dental prophylaxis   N, V,  D     when     took   for   dental prophylaxis   N, V,  D     when     took   for   dental prophylaxis      Seasonal Allergies      Simvastatin Muscle Pain (Myalgia)       Social History   Social History     Socioeconomic History     Marital status:    Tobacco Use     Smoking status: Former     Packs/day: 1.00     Years: 20.00     Pack years: 20.00     Types: Cigarettes     Start date: 1964     Quit date: 1984     Years since quittin.4     Smokeless tobacco: Never   Vaping Use     Vaping status: Never Used   Substance and Sexual Activity     Alcohol use: Yes     Comment: one beer or mixed drink a day in warmer weather     Drug use: Never     Sexual activity: Not Currently     Partners: Female     Birth control/protection: None        Family History  Family History   Problem Relation Age of Onset     Arthritis Mother      Cerebrovascular Disease Father      Diabetes Sister        Review of Systems  As per HPI and PMHx, otherwise 10+ comprehensive system review is negative.    Physical Exam  /79   Pulse 82   Temp 97.8  F (36.6  C) (Tympanic)   Resp 16   Wt 113.4 kg (250 lb)   SpO2 97%   BMI 36.40 kg/m    GENERAL: Patient is a pleasant, cooperative 77 year old male in no acute distress.  HEAD: Normocephalic, atraumatic.  Hair and scalp are normal.  EYES: Pupils are equal, round, reactive to light and accommodation.  Extraocular movements are intact.  The sclera nonicteric without injection.  The extraocular structures are normal.  EARS: Normal shape and symmetry.  No tenderness when palpating the mastoid or tragal areas bilaterally.  Otoscopic exam on the right reveals a clear canal.  The right tympanic membrane is round, intact with some mild to moderate around retraction.  No evidence of middle ear effusion.  No retraction pockets, granulation, drainage, or evidence of cholesteatoma.  Otoscopic exam on the left reveals a moderate amount of cerumen.  The left tympanic membrane is round, intact without evidence effusion, good landmarks.    NOSE: Nares are patent.  Nasal mucosa is pink and moist.  Negative anterior rhinoscopy.  NEUROLOGIC: Cranial nerves II through XII are grossly intact.  Voice is strong.  Patient is House-Brackmann II/VI on the right with some mild mid facial weakness, marginal weakness but complete eye closure.  The patient is House-Brackmann I/VI on the left.   CARDIOVASCULAR: Extremities are warm and well-perfused.  No significant peripheral edema.  RESPIRATORY: Patient has nonlabored breathing without cough, wheeze, stridor.  PSYCHIATRIC: Patient is alert and oriented.  Mood and affect appear normal.  SKIN: Warm and dry.  No scalp, face, or neck lesions noted.    Audiogram  The patient underwent an audiogram  performed today.  My review of the audiogram shows normal hearing to 500 Hz sloping to mild sloping to moderate sloping to moderately severe sensorineural hearing loss in both ears with asymmetry on the right.  Pure-tone average is 46 dB on the right and 35 dB on the left.  Speech reception threshold is 40 dB on the right and 35 dB on the left.  The patient had 40% word recognition at 80 dB above hearing level on the right and 96% word recognition at 75 dB above hearing level on the left.  The patient had a negative pressure type C tympanogram on the right and a type A shallow tympanogram on the left.     Assessment and Plan     ICD-10-CM    1. Sudden right hearing loss  H91.21 Adult Audiology  Referral     MR Brain w/o & w Contrast      2. Asymmetrical sensorineural hearing loss  H90.3 Adult Audiology  Referral     MR Brain w/o & w Contrast      3. Sensorineural hearing loss, bilateral  H90.3 Adult Audiology  Referral     MR Brain w/o & w Contrast      4. Pulsatile tinnitus, right ear  H93.A1 Adult Audiology  Referral     MR Brain w/o & w Contrast      5. Eustachian tube dysfunction, right  H69.81       6. Tympanic membrane retraction, right  H73.891         It was my pleasure seeing Anil BRAND Bhavik Breaux today in clinic.  The patient presents to me today with sudden hearing change in the right ear about 6 months ago.  Interestingly, he has also had a facial nerve paresis that developed a few months prior to that.  I think given his asymmetric hearing loss and the facial nerve paresis on the same side necessitates that we obtain MRI imaging of the inner ear and brainstem.  I will place an order for MRI.  He does have some negative pressure/eustachian tube dysfunction on that right side.  We briefly discussed ear tube placement to see if ventilating the middle ear space helps with some of the pulsatile tinnitus.  I am concerned that given his decline in word recognition, that really  his clarity is the reason that the ear feels plugged and not his eustachian tube dysfunction.  I think I would like to review the MRI prior to offering him an ear tube to make sure that he does not have any retrocochlear pathology.  We also discussed meeting with his audiologist to have hearing aids adjusted.    We spent the remainder of today's visit on education. We discussed steps that can be taken to mask the noise, such as a low volume de-tuned radio, a fan in the background, and/or hearing aids.  Correlation with stress, anxiety, and depression were also discussed.  The patient was also cautioned on the numerous expensive non-pharmaceutical options that are advertised, and have no proven benefit.    The patient will follow up as necessary for worsening symptoms or changes in symptoms. I have also recommended repeat audiogram in 1-3 years, or sooner if symptoms warrant.      Rojelio Horton MD  Department of Otolaryngology-Head and Neck Surgery  [facility]

## 2023-06-08 ENCOUNTER — OFFICE VISIT (OUTPATIENT)
Dept: OTOLARYNGOLOGY | Facility: CLINIC | Age: 78
End: 2023-06-08
Payer: COMMERCIAL

## 2023-06-08 ENCOUNTER — OFFICE VISIT (OUTPATIENT)
Dept: AUDIOLOGY | Facility: CLINIC | Age: 78
End: 2023-06-08
Payer: COMMERCIAL

## 2023-06-08 VITALS
DIASTOLIC BLOOD PRESSURE: 79 MMHG | WEIGHT: 250 LBS | OXYGEN SATURATION: 97 % | BODY MASS INDEX: 36.4 KG/M2 | RESPIRATION RATE: 16 BRPM | HEART RATE: 82 BPM | SYSTOLIC BLOOD PRESSURE: 128 MMHG | TEMPERATURE: 97.8 F

## 2023-06-08 DIAGNOSIS — H90.3 SENSORINEURAL HEARING LOSS, BILATERAL: ICD-10-CM

## 2023-06-08 DIAGNOSIS — H90.3 ASYMMETRICAL SENSORINEURAL HEARING LOSS: ICD-10-CM

## 2023-06-08 DIAGNOSIS — H93.A1 PULSATILE TINNITUS, RIGHT EAR: ICD-10-CM

## 2023-06-08 DIAGNOSIS — H91.21 SUDDEN RIGHT HEARING LOSS: ICD-10-CM

## 2023-06-08 DIAGNOSIS — H90.3 SENSORINEURAL HEARING LOSS, ASYMMETRICAL: Primary | ICD-10-CM

## 2023-06-08 DIAGNOSIS — H91.21 SUDDEN RIGHT HEARING LOSS: Primary | ICD-10-CM

## 2023-06-08 DIAGNOSIS — H69.91 EUSTACHIAN TUBE DYSFUNCTION, RIGHT: ICD-10-CM

## 2023-06-08 DIAGNOSIS — H73.891 TYMPANIC MEMBRANE RETRACTION, RIGHT: ICD-10-CM

## 2023-06-08 PROCEDURE — 92550 TYMPANOMETRY & REFLEX THRESH: CPT

## 2023-06-08 PROCEDURE — 92557 COMPREHENSIVE HEARING TEST: CPT

## 2023-06-08 PROCEDURE — 99214 OFFICE O/P EST MOD 30 MIN: CPT | Performed by: OTOLARYNGOLOGY

## 2023-06-08 NOTE — NURSING NOTE
Chief Complaint   Patient presents with     Hearing Problem     Per patient muffled sound in right ear and hearing heart beating in right ear  Here to discus with provider       There were no vitals filed for this visit.  Wt Readings from Last 1 Encounters:   03/24/23 114.1 kg (251 lb 8 oz)     Amanda Anguiano MA

## 2023-06-08 NOTE — LETTER
6/8/2023         RE: Anil Gutierres Jr.  [address] [facility] [address]        Dear Colleague,    Thank you for referring your patient, Anil Gutierres Jr., to the [facility]. Please see a copy of my visit note below.    Chief Complaint   Patient presents with     Hearing Problem     Per patient muffled sound in right ear and hearing heart beating in right ear  Here to discus with provider     History of Present Illness  Anil Gutierres Jr. is a 77 year old male who presents today for evaluation.  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 Given this, we went to the OR for definitive excision. The patient went to the operating room and underwent right partial glossectomy on 12/2/2021.  The patient had a normal postoperative course.  There was no postoperative evidence of bleeding.  Final pathology showed focal mild squamous cell dysplasia, without extension to margins. He was seen for follow up on 1/3/2022 and was doing well. He was referred again for decreased hearing an pulsatile tinnitus. He presents today for follow up.     From a symptom standpoint, the patient reports developing right-sided Bell's palsy in November 2022.  He has been having slow improvement in his facial nerve paresis but is still asymmetric on the right.  He does have known asymmetric hearing loss on the right and in January 2023 had sudden change in his hearing with plugging, muffling, decreased hearing.  He is also noticed intermittent pulsatile tinnitus on the right-hand side.  He currently denies any otalgia, otorrhea, bloody otorrhea.  No dizziness or vertigo.  No prior history of ear surgery.  He does wear bilateral [facility]    Past Medical History  Patient Active Problem List   Diagnosis     Stage 3a  chronic kidney disease (H)     Coronary artery disease involving native heart without angina pectoris, unspecified vessel or lesion type     Type 2 diabetes mellitus with stage 3 chronic kidney disease, without long-term current use of insulin, unspecified whether stage 3a or 3b CKD (H)     Uncontrolled hypertension     Hypothyroidism, unspecified type     Morbid obesity (H)     Carotid stenosis, right     Current Medications     Current Outpatient Medications:      aspirin (ASA) 81 MG EC tablet, Take 81 mg by mouth 2 times daily (with meals), Disp: , Rfl:      atenolol (TENORMIN) 25 MG tablet, Take 0.5 tablets (12.5 mg) by mouth daily, Disp: 45 tablet, Rfl: 3     cetirizine (ZYRTEC) 10 MG tablet, Take 10 mg by mouth every evening, Disp: , Rfl:      Glucose Blood (BLOOD GLUCOSE TEST STRIPS) STRP, by In Vitro route daily, Disp: , Rfl:      levothyroxine (SYNTHROID/LEVOTHROID) 137 MCG tablet, TAKE 1 TABLET BY MOUTH DAILY. SCHEDULE LAB APPOINTMENT AND WELLNESS EXAM, Disp: 90 tablet, Rfl: 2     lisinopril (ZESTRIL) 40 MG tablet, Take 1 tablet (40 mg) by mouth daily, Disp: 90 tablet, Rfl: 3     loratadine (CLARITIN) 10 MG tablet, Take 10 mg by mouth, Disp: , Rfl:      furosemide (LASIX) 20 MG tablet, Take 20 mg by mouth as needed, Disp: , Rfl:     Allergies  Allergies   Allergen Reactions     Amoxicillin GI Disturbance and Nausea     N, V,  D   6/  when     took   for   dental prophylaxis   N, V,  D   6  when     took   for   dental prophylaxis   N, V,  D     when     took   for   dental prophylaxis      Seasonal Allergies      Simvastatin Muscle Pain (Myalgia)       Social History   Social History     Socioeconomic History     Marital status:    Tobacco Use     Smoking status: Former     Packs/day: 1.00     Years: 20.00     Pack years: 20.00     Types: Cigarettes     Start date: 1964     Quit date: 1984     Years since quittin.4     Smokeless tobacco: Never   Vaping Use     Vaping status:  Never Used   Substance and Sexual Activity     Alcohol use: Yes     Comment: one beer or mixed drink a day in warmer weather     Drug use: Never     Sexual activity: Not Currently     Partners: Female     Birth control/protection: None       Family History  Family History   Problem Relation Age of Onset     Arthritis Mother      Cerebrovascular Disease Father      Diabetes Sister        Review of Systems  As per HPI and PMHx, otherwise 10+ comprehensive system review is negative.    Physical Exam  /79   Pulse 82   Temp 97.8  F (36.6  C) (Tympanic)   Resp 16   Wt 113.4 kg (250 lb)   SpO2 97%   BMI 36.40 kg/m    GENERAL: Patient is a pleasant, cooperative 77 year old male in no acute distress.  HEAD: Normocephalic, atraumatic.  Hair and scalp are normal.  EYES: Pupils are equal, round, reactive to light and accommodation.  Extraocular movements are intact.  The sclera nonicteric without injection.  The extraocular structures are normal.  EARS: Normal shape and symmetry.  No tenderness when palpating the mastoid or tragal areas bilaterally.  Otoscopic exam on the right reveals a clear canal.  The right tympanic membrane is round, intact with some mild to moderate around retraction.  No evidence of middle ear effusion.  No retraction pockets, granulation, drainage, or evidence of cholesteatoma.  Otoscopic exam on the left reveals a moderate amount of cerumen.  The left tympanic membrane is round, intact without evidence effusion, good landmarks.    NOSE: Nares are patent.  Nasal mucosa is pink and moist.  Negative anterior rhinoscopy.  NEUROLOGIC: Cranial nerves II through XII are grossly intact.  Voice is strong.  Patient is House-Brackmann II/VI on the right with some mild mid facial weakness, marginal weakness but complete eye closure.  The patient is House-Brackmann I/VI on the left.   CARDIOVASCULAR: Extremities are warm and well-perfused.  No significant peripheral edema.  RESPIRATORY: Patient has  nonlabored breathing without cough, wheeze, stridor.  PSYCHIATRIC: Patient is alert and oriented.  Mood and affect appear normal.  SKIN: Warm and dry.  No scalp, face, or neck lesions noted.    Audiogram  The patient underwent an audiogram performed today.  My review of the audiogram shows normal hearing to 500 Hz sloping to mild sloping to moderate sloping to moderately severe sensorineural hearing loss in both ears with asymmetry on the right.  Pure-tone average is 46 dB on the right and 35 dB on the left.  Speech reception threshold is 40 dB on the right and 35 dB on the left.  The patient had 40% word recognition at 80 dB above hearing level on the right and 96% word recognition at 75 dB above hearing level on the left.  The patient had a negative pressure type C tympanogram on the right and a type A shallow tympanogram on the left.     Assessment and Plan     ICD-10-CM    1. Sudden right hearing loss  H91.21 Adult Audiology  Referral     MR Brain w/o & w Contrast      2. Asymmetrical sensorineural hearing loss  H90.3 Adult Audiology  Referral     MR Brain w/o & w Contrast      3. Sensorineural hearing loss, bilateral  H90.3 Adult Audiology  Referral     MR Brain w/o & w Contrast      4. Pulsatile tinnitus, right ear  H93.A1 Adult Audiology  Referral     MR Brain w/o & w Contrast      5. Eustachian tube dysfunction, right  H69.81       6. Tympanic membrane retraction, right  H73.891         It was my pleasure seeing Anil Rinconanand Breaux today in clinic.  The patient presents to me today with sudden hearing change in the right ear about 6 months ago.  Interestingly, he has also had a facial nerve paresis that developed a few months prior to that.  I think given his asymmetric hearing loss and the facial nerve paresis on the same side necessitates that we obtain MRI imaging of the inner ear and brainstem.  I will place an order for MRI.  He does have some negative  pressure/eustachian tube dysfunction on that right side.  We briefly discussed ear tube placement to see if ventilating the middle ear space helps with some of the pulsatile tinnitus.  I am concerned that given his decline in word recognition, that really his clarity is the reason that the ear feels plugged and not his eustachian tube dysfunction.  I think I would like to review the MRI prior to offering him an ear tube to make sure that he does not have any retrocochlear pathology.  We also discussed meeting with his audiologist to have hearing aids adjusted.    We spent the remainder of today's visit on education. We discussed steps that can be taken to mask the noise, such as a low volume de-tuned radio, a fan in the background, and/or hearing aids.  Correlation with stress, anxiety, and depression were also discussed.  The patient was also cautioned on the numerous expensive non-pharmaceutical options that are advertised, and have no proven benefit.    The patient will follow up as necessary for worsening symptoms or changes in symptoms. I have also recommended repeat audiogram in 1-3 years, or sooner if symptoms warrant.      Rojelio Horton MD  Department of Otolaryngology-Head and Neck Surgery  [facility]         Again, thank you for allowing me to participate in the care of your patient.        Sincerely,        Rojelio Horton MD

## 2023-06-08 NOTE — PROGRESS NOTES
AUDIOLOGY REPORT:    Patient was referred to [facility]iology from ENT by Dr. Horton for a hearing examination. Patient comes to the clinic with concerns regarding muffled hearing and new 'heartbeat' sound in his right ear. [place] reports being diagnosed with Delano Pasly last November, but states these new right ear symptoms did not begin until January of this year. He denies any pain, drainage and changes to his longstanding tinnitus. [place] currently wears binaural [facility]. He was not accompanied to today's appointment.     Testing:    Otoscopy:   Otoscopic exam indicates ears are clear of cerumen bilaterally     Tympanograms:    RIGHT: negative pressure      LEFT:   restricted eardrum mobility     Reflexes (reported by stimulus ear): 1000 Hz  RIGHT: Ipsilateral is absent at frequencies tested  RIGHT: Contralateral is absent at frequencies tested  LEFT:   Ipsilateral is absent at frequencies tested  LEFT:   Contralateral is absent at frequencies tested    Thresholds:   Pure Tone Thresholds assessed using conventional audiometry with good  reliability from 250-8000 Hz bilaterally using insert earphones and circumaural headphones     RIGHT:  normal sloping to moderately-severe sensorineural hearing loss    LEFT:    normal sloping to moderately-severe sensorineural hearing loss   *New air-bone gap noted at 250 Hz in both ears    Speech Reception Threshold:    RIGHT: 40 dB HL    LEFT:   35 dB HL  Speech Reception Thresholds are in good agreement with pure tone thresholds    Word Recognition Score:     RIGHT: 40% at 80 dB HL using NU-6 recorded word list.    RIGHT: 52% at 85 dB HL using NU-6 recorded word list    LEFT:   96% at 75 dB HL using NU-6 recorded word list.    Discussed results with the patient. When compared to previous results on 2/3/2022, hearing has remained stable with the exception of new air-bone gap which was noted above    Patient was returned to ENT for follow up.      Key Hong, CCC-A  Licensed Audiologist  [place] #[id number]    06/08/23

## 2023-06-13 ENCOUNTER — HOSPITAL ENCOUNTER (OUTPATIENT)
Dept: MRI IMAGING | Facility: CLINIC | Age: 78
Discharge: HOME | End: 2023-06-13
Attending: OTOLARYNGOLOGY | Admitting: OTOLARYNGOLOGY
Payer: COMMERCIAL

## 2023-06-13 DIAGNOSIS — H93.A1 PULSATILE TINNITUS, RIGHT EAR: ICD-10-CM

## 2023-06-13 DIAGNOSIS — H91.21 SUDDEN RIGHT HEARING LOSS: ICD-10-CM

## 2023-06-13 DIAGNOSIS — H90.3 SENSORINEURAL HEARING LOSS, BILATERAL: ICD-10-CM

## 2023-06-13 DIAGNOSIS — H90.3 ASYMMETRICAL SENSORINEURAL HEARING LOSS: ICD-10-CM

## 2023-06-13 PROCEDURE — A9585 GADOBUTROL INJECTION: HCPCS | Performed by: OTOLARYNGOLOGY

## 2023-06-13 PROCEDURE — 255N000002 HC RX 255 OP 636: Performed by: OTOLARYNGOLOGY

## 2023-06-13 PROCEDURE — 70553 MRI BRAIN STEM W/O & W/DYE: CPT

## 2023-06-13 RX ORDER — GADOBUTROL 604.72 MG/ML
11 INJECTION INTRAVENOUS ONCE
Status: COMPLETED | OUTPATIENT
Start: 2023-06-13 | End: 2023-06-13

## 2023-06-13 RX ADMIN — GADOBUTROL 11 ML: 604.72 INJECTION INTRAVENOUS at 14:04

## 2023-10-03 ENCOUNTER — TRANSFERRED RECORDS (OUTPATIENT)
Dept: HEALTH INFORMATION MANAGEMENT | Facility: CLINIC | Age: 78
End: 2023-10-03
Payer: COMMERCIAL

## 2023-10-08 ENCOUNTER — HEALTH MAINTENANCE LETTER (OUTPATIENT)
Age: 78
End: 2023-10-08

## 2023-11-06 NOTE — COMMUNITY RESOURCES LIST (ENGLISH)
11/06/2023    [employer]  N/A  For questions about this resource list or additional care needs, please contact your primary care clinic or care manager.  Phone: [phone]   Email: N/A   Address: [address] [place] [address]   Hours: N/A        Financial Stability       Utility payment assistance  1  Community Helping [employer] Distance: 8.98 miles      In-Person, Phone/Virtual   [address] [place] [address]  Language: English  Hours: Mon - Sun Appt. Only  Fees: Free   Phone: (826) 698-7178 Email: maria isabel@[employer].org Website: http://www.communityDoor 6pinghand.org     2  [facility] [employer] (ACCAP) - Energy Assistance Program Distance: 14.5 miles      In-Person, Phone/Virtual   [address] [address]  Language: English  Hours: Mon - Fri 8:00 AM - 4:30 PM  Fees: Free   Phone: (696) 584-8873 Email: accap@accap.org Website: http://www.accap.org          Important Numbers & Websites       Emergency Services   911  [facility] Services   [facility]  Poison Control   (612) 867-7025  Suicide Prevention Lifeline   (162) 218-2334 (TALK)  Child Abuse Hotline   (233) 244-1595 (4-A-Child)  Sexual Assault Hotline   (896) 245-2773 (HOPE)  National Runaway Safeline   (864) 407-6737 (RUNAWAY)  All-Options Talkline   (110) 437-8508  Substance Abuse Referral   (304) 541-6072 (HELP)

## 2023-11-08 ENCOUNTER — PATIENT OUTREACH (OUTPATIENT)
Dept: CARE COORDINATION | Facility: CLINIC | Age: 78
End: 2023-11-08
Payer: COMMERCIAL

## 2023-11-08 DIAGNOSIS — I10 UNCONTROLLED HYPERTENSION: ICD-10-CM

## 2023-11-08 NOTE — TELEPHONE ENCOUNTER
Pending Prescriptions:                       Disp   Refills    lisinopril (ZESTRIL) 40 MG tablet         90 tab*3            Sig: Take 1 tablet (40 mg) by mouth daily

## 2023-11-09 NOTE — TELEPHONE ENCOUNTER
Routing refill request to provider for review/approval because:  Labs out of range:    Creatinine   Date Value Ref Range Status   11/25/2022 1.41 (H) 0.67 - 1.17 mg/dL Final   11/06/2018 1.46 (H) 0.66 - 1.25 mg/dL Final       Last Written Prescription Date:  12/14/22  Last Fill Quantity: 90,  # refills: 3   Last office visit: 3/24/2023 ; last virtual visit: Visit date not found with prescribing provider:     Future Office Visit:   Next 5 appointments (look out 90 days)      Nov 13, 2023  2:00 PM  (Arrive by 1:40 PM)  Pre-Operative Physical with Gilberto Alonzo MD  [facility] ([facility] )  Arrive at: Clinic A [address] [place] [id number]  [phone]           Julie Behrendt RN

## 2023-11-10 RX ORDER — LISINOPRIL 40 MG/1
40 TABLET ORAL DAILY
Qty: 90 TABLET | Refills: 3 | Status: SHIPPED | OUTPATIENT
Start: 2023-11-10

## 2023-11-13 ENCOUNTER — OFFICE VISIT (OUTPATIENT)
Dept: FAMILY MEDICINE | Facility: CLINIC | Age: 78
End: 2023-11-13
Payer: COMMERCIAL

## 2023-11-13 VITALS
WEIGHT: 255 LBS | HEIGHT: 71 IN | TEMPERATURE: 98.5 F | RESPIRATION RATE: 20 BRPM | SYSTOLIC BLOOD PRESSURE: 128 MMHG | OXYGEN SATURATION: 97 % | HEART RATE: 59 BPM | BODY MASS INDEX: 35.7 KG/M2 | DIASTOLIC BLOOD PRESSURE: 88 MMHG

## 2023-11-13 DIAGNOSIS — I45.10 RBBB: ICD-10-CM

## 2023-11-13 DIAGNOSIS — Z01.818 PREOP GENERAL PHYSICAL EXAM: Primary | ICD-10-CM

## 2023-11-13 DIAGNOSIS — M16.12 PRIMARY OSTEOARTHRITIS OF LEFT HIP: ICD-10-CM

## 2023-11-13 DIAGNOSIS — Z29.11 NEED FOR VACCINATION AGAINST RESPIRATORY SYNCYTIAL VIRUS: ICD-10-CM

## 2023-11-13 DIAGNOSIS — E78.2 MIXED HYPERLIPIDEMIA: ICD-10-CM

## 2023-11-13 DIAGNOSIS — N18.30 TYPE 2 DIABETES MELLITUS WITH STAGE 3 CHRONIC KIDNEY DISEASE, WITHOUT LONG-TERM CURRENT USE OF INSULIN, UNSPECIFIED WHETHER STAGE 3A OR 3B CKD (H): ICD-10-CM

## 2023-11-13 DIAGNOSIS — E03.9 HYPOTHYROIDISM, UNSPECIFIED TYPE: ICD-10-CM

## 2023-11-13 DIAGNOSIS — E66.01 MORBID OBESITY (H): ICD-10-CM

## 2023-11-13 DIAGNOSIS — E11.22 TYPE 2 DIABETES MELLITUS WITH STAGE 3 CHRONIC KIDNEY DISEASE, WITHOUT LONG-TERM CURRENT USE OF INSULIN, UNSPECIFIED WHETHER STAGE 3A OR 3B CKD (H): ICD-10-CM

## 2023-11-13 DIAGNOSIS — N18.31 STAGE 3A CHRONIC KIDNEY DISEASE (H): ICD-10-CM

## 2023-11-13 DIAGNOSIS — Z23 NEED FOR SHINGLES VACCINE: ICD-10-CM

## 2023-11-13 DIAGNOSIS — I25.10 CORONARY ARTERY DISEASE INVOLVING NATIVE HEART WITHOUT ANGINA PECTORIS, UNSPECIFIED VESSEL OR LESION TYPE: ICD-10-CM

## 2023-11-13 LAB
ANION GAP SERPL CALCULATED.3IONS-SCNC: 11 MMOL/L (ref 7–15)
BUN SERPL-MCNC: 21.6 MG/DL (ref 8–23)
CALCIUM SERPL-MCNC: 10 MG/DL (ref 8.8–10.2)
CHLORIDE SERPL-SCNC: 100 MMOL/L (ref 98–107)
CHOLEST SERPL-MCNC: 206 MG/DL
CREAT SERPL-MCNC: 1.43 MG/DL (ref 0.67–1.17)
DEPRECATED HCO3 PLAS-SCNC: 26 MMOL/L (ref 22–29)
EGFRCR SERPLBLD CKD-EPI 2021: 51 ML/MIN/1.73M2
GLUCOSE SERPL-MCNC: 106 MG/DL (ref 70–99)
HBA1C MFR BLD: 6.1 % (ref 0–5.6)
HDLC SERPL-MCNC: 41 MG/DL
HGB BLD-MCNC: 14.3 G/DL (ref 13.3–17.7)
LDLC SERPL CALC-MCNC: 137 MG/DL
NONHDLC SERPL-MCNC: 165 MG/DL
POTASSIUM SERPL-SCNC: 4.6 MMOL/L (ref 3.4–5.3)
SODIUM SERPL-SCNC: 137 MMOL/L (ref 135–145)
TRIGL SERPL-MCNC: 141 MG/DL
TSH SERPL DL<=0.005 MIU/L-ACNC: 3.21 UIU/ML (ref 0.3–4.2)

## 2023-11-13 PROCEDURE — 36415 COLL VENOUS BLD VENIPUNCTURE: CPT | Performed by: FAMILY MEDICINE

## 2023-11-13 PROCEDURE — 83036 HEMOGLOBIN GLYCOSYLATED A1C: CPT | Performed by: FAMILY MEDICINE

## 2023-11-13 PROCEDURE — 80061 LIPID PANEL: CPT | Performed by: FAMILY MEDICINE

## 2023-11-13 PROCEDURE — 85018 HEMOGLOBIN: CPT | Performed by: FAMILY MEDICINE

## 2023-11-13 PROCEDURE — 93000 ELECTROCARDIOGRAM COMPLETE: CPT | Performed by: FAMILY MEDICINE

## 2023-11-13 PROCEDURE — 80048 BASIC METABOLIC PNL TOTAL CA: CPT | Performed by: FAMILY MEDICINE

## 2023-11-13 PROCEDURE — 99214 OFFICE O/P EST MOD 30 MIN: CPT | Mod: 25 | Performed by: FAMILY MEDICINE

## 2023-11-13 PROCEDURE — 90662 IIV NO PRSV INCREASED AG IM: CPT | Performed by: FAMILY MEDICINE

## 2023-11-13 PROCEDURE — 84443 ASSAY THYROID STIM HORMONE: CPT | Performed by: FAMILY MEDICINE

## 2023-11-13 PROCEDURE — 91320 SARSCV2 VAC 30MCG TRS-SUC IM: CPT | Performed by: FAMILY MEDICINE

## 2023-11-13 PROCEDURE — G0008 ADMIN INFLUENZA VIRUS VAC: HCPCS | Performed by: FAMILY MEDICINE

## 2023-11-13 PROCEDURE — 90480 ADMN SARSCOV2 VAC 1/ONLY CMP: CPT | Performed by: FAMILY MEDICINE

## 2023-11-13 RX ORDER — FUROSEMIDE 20 MG
20 TABLET ORAL PRN
Status: CANCELLED | OUTPATIENT
Start: 2023-11-13

## 2023-11-13 RX ORDER — RESPIRATORY SYNCYTIAL VIRUS VACCINE 120MCG/0.5
0.5 KIT INTRAMUSCULAR ONCE
Qty: 1 EACH | Refills: 0 | Status: CANCELLED | OUTPATIENT
Start: 2023-11-13 | End: 2023-11-13

## 2023-11-13 ASSESSMENT — PAIN SCALES - GENERAL: PAINLEVEL: SEVERE PAIN (6)

## 2023-11-13 NOTE — COMMUNITY RESOURCES LIST (ENGLISH)
11/13/2023    [employer]  N/A  For questions about this resource list or additional care needs, please contact your primary care clinic or care manager.  Phone: [phone]   Email: N/A   Address: [address] [place] [address]   Hours: N/A        Financial Stability       Utility payment assistance  1  Community Helping [employer] Distance: 8.98 miles      In-Person, Phone/Virtual   [address] [place] [address]  Language: English  Hours: Mon - Sun Appt. Only  Fees: Free   Phone: (150) 158-1662 Email: maria isabel@[employer] Website: http://www.communityIntelliWheelspinghand.org     2  [facility] Community [employer] (ACCAP) - Energy Assistance Program Distance: 14.5 miles      In-Person, Phone/Virtual   [address] [address]  Language: English  Hours: Mon - Fri 8:00 AM - 4:30 PM  Fees: Free   Phone: (552) 693-3965 Email: accap@accap.org Website: http://www.accap.org          Important Numbers & Websites       Emergency Services   911  [facility] Services   [facility]  Poison Control   (430) 486-3730  Suicide Prevention Lifeline   (670) 930-5290 (TALK)  Child Abuse Hotline   (160) 539-7575 (4-A-Child)  Sexual Assault Hotline   (624) 168-8506 (HOPE)  National Runaway Safeline   (506) 862-9596 (RUNAWAY)  All-Options Talkline   (780) 645-5427  Substance Abuse Referral   (511) 686-6521 (HELP)

## 2023-11-13 NOTE — PROGRESS NOTES
Patient presents with:  Sore Throat: sore throat      HPI:   Sravani Pearson is a 12year old female who presents with sore throat since yesterday. Patient accompanied with parents. Patient had no known exposure to COVID/strep. Mild symptoms at this time.  To [facility]  [address] [facility] [address] [facility] [address]  Phone: [phone]  Primary Provider: Gilberto Alonzo  Pre-op Performing Provider: GILBRETO ALONZO      PREOPERATIVE EVALUATION:  Today's date: 11/13/2023    [place] is a 77 year old male who presents for a preoperative evaluation.      11/13/2023     1:35 PM   Additional Questions   Roomed by Tyra cardenas ma   Accompanied by Self         11/13/2023     1:35 PM   Patient Reported Additional Medications   Patient reports taking the following new medications None       Surgical Information:  Surgery/Procedure: Left total hip arthroplasty  Surgery Location: [facility]   Surgeon: Dr. Oates  Surgery Date: 12/4/23  Time of Surgery: 0916  Where patient plans to recover: At home with family  Fax number for surgical facility: [phone]    Assessment & Plan     The proposed surgical procedure is considered INTERMEDIATE risk.    Preop general physical exam  - EKG 12-lead complete w/read - Clinics    Primary osteoarthritis of left hip    Type 2 diabetes mellitus with stage 3 chronic kidney disease, without long-term current use of insulin, unspecified whether stage 3a or 3b CKD (H)  No change in treatment for now. Await labs.  Reinforced carbohydrate control.  Regular exercise 30 mins per day, 3 times a week.  Regular foot care, annual eye exam.  Flu vaccine every year.   - HEMOGLOBIN A1C  - BASIC METABOLIC PANEL  - Hemoglobin  - EKG 12-lead complete w/read - Clinics  - Lipid panel reflex to direct LDL Fasting  - Lipid panel reflex to direct LDL Fasting  - Hemoglobin  - BASIC METABOLIC PANEL  - HEMOGLOBIN A1C    Hypothyroidism, unspecified type  Stable. Asymptomatic.  Nop change in med for now.  - TSH WITH FREE T4 REFLEX  - TSH WITH FREE T4 REFLEX    Morbid obesity (H)  Increases complexity of management of the above chronic conditions.  Patient was advised healthy diet recommendations.  Patient was  advised weekly exercise recommendations and goals.   - EKG 12-lead complete w/read - Clinics    Stage 3a chronic kidney disease (H)  Maintain hydration. Avoid nephrotoxins.   - EKG 12-lead complete w/read - Clinics    Mixed hyperlipidemia  Reinforced heart healthy lifestyle.  Not on statin at this time. Lab today showed borderline high levels.  Will offer low dose statin due to presence of other comorbidities.  - EKG 12-lead complete w/read - Clinics  - Lipid panel reflex to direct LDL Fasting  - Lipid panel reflex to direct LDL Fasting    Coronary artery disease involving native heart without angina pectoris, unspecified vessel or lesion type  Asymptomatic.  Reviewed previous work up. EKG today showed no change compared to before.  No additional cardiac testing needed due to the above.  Reinforced heart healthy lifestyle.  Monitor perioperative EKG and refer to cardiology as appropriate.  - EKG 12-lead complete w/read - Clinics  - Lipid panel reflex to direct LDL Fasting  - Lipid panel reflex to direct LDL Fasting    Need for vaccination against respiratory syncytial virus  Patient will get vaccine from pharmacy    Need for shingles vaccine  Patient to check with insurance about coverage     RBBB  Chronic. Asymptomatic.  Monitor EKG perioperatively              - No identified additional risk factors other than previously addressed    Antiplatelet or Anticoagulation Medication Instructions:   - aspirin: Discontinue aspirin 7-10 days prior to procedure to reduce bleeding risk. It should be resumed postoperatively.     Additional Medication Instructions:  Patient is to take all scheduled medications on the day of surgery EXCEPT for modifications listed below:   - ACE/ARB: HOLD on day of surgery (minimum 11 hours for general anesthesia).   - Beta Blockers: Continue taking on the day of surgery.   - Herbal medications and vitamins: HOLD 14 days prior to surgery.    RECOMMENDATION:  APPROVAL GIVEN to proceed with  non-tender  LUNGS: non labored breathing,  clear to auscultation bilaterally, no wheezing, rales, or rhonchi.   CARDIO: RRR without murmur  EXTREMITIES: no cyanosis, clubbing or edema  LYMPH:  no cervical, submandibular, periauricular, or supraclavicular ly "proposed procedure, without further diagnostic evaluation.            Subjective       HPI related to upcoming procedure: Patient has left hip OA causing chronic pain. Hence, planned surgery. Reviewed above with patient.         11/6/2023    11:46 AM   Preop Questions   1. Have you ever had a heart attack or stroke? No   2. Have you ever had surgery on your heart or blood vessels, such as a stent placement, a coronary artery bypass, or surgery on an artery in your head, neck, heart, or legs? No   3. Do you have chest pain with activity? No   4. Do you have a history of  heart failure? No   5. Do you currently have a cold, bronchitis or symptoms of other infection? No   6. Do you have a cough, shortness of breath, or wheezing? No   7. Do you or anyone in your family have previous history of blood clots? YES - older sister with stroke; father had a possible leg blood clot and \"mini strokes\"   8. Do you or does anyone in your family have a serious bleeding problem such as prolonged bleeding following surgeries or cuts? No   9. Have you ever had problems with anemia or been told to take iron pills? No   10. Have you had any abnormal blood loss such as black, tarry or bloody stools? No   11. Have you ever had a blood transfusion? No   12. Are you willing to have a blood transfusion if it is medically needed before, during, or after your surgery? Yes   13. Have you or any of your relatives ever had problems with anesthesia? No   14. Do you have sleep apnea, excessive snoring or daytime drowsiness? No   15. Do you have any artifical heart valves or other implanted medical devices like a pacemaker, defibrillator, or continuous glucose monitor? No   16. Do you have artificial joints? YES - right hip and left knee   17. Are you allergic to latex? No       Health Care Directive:  Patient does not have a Health Care Directive or Living Will: Patient states has Advance Directive and will bring in a copy to " clinic.    Preoperative Review of :   reviewed - no record of controlled substances prescribed.      Status of Chronic Conditions:  See problem list for active medical problems.  Problems all longstanding and stable, except as noted/documented.  See ROS for pertinent symptoms related to these conditions.    CAD - Patient has a longstanding history of moderate-severe CAD. Patient denies recent chest pain or NTG use, denies exercise induced dyspnea or PND. Last Stress test April 2022 which showed likely apical thinning and small mamount of ischemia and/or artifact per cardiologist, EKG sinus bradycardia and RBBB in 11/2022.     DIABETES - Patient has a longstanding history of DiabetesType Type II . Patient is being treated with diet and ASA and denies significant side effects. Control has been good. Complicating factors include but are not limited to: hypertension, hyperlipidemia, chronic kidney disease, CAD/PVD, and morbid obesity .     HYPERLIPIDEMIA - Patient has a long history of significant Hyperlipidemia requiring medication for treatment with recent good control. Patient is not on statin at this time    HYPERTENSION - Patient has longstanding history of HTN , currently denies any symptoms referable to elevated blood pressure. Specifically denies chest pain, palpitations, dyspnea, orthopnea, PND or peripheral edema. Blood pressure readings have been in normal range. Current medication regimen is as listed below. Patient denies any side effects of medication.     HYPOTHYROIDISM - Patient has a longstanding history of chronic Hypothyroidism. Patient has been doing well, noting no tremor, insomnia, hair loss or changes in skin texture. Continues to take medications as directed, without adverse reactions or side effects. Last TSH   Lab Results   Component Value Date    TSH 3.21 11/13/2023   .      RENAL INSUFFICIENCY - Patient has a longstanding history of moderate-severe chronic renal insufficiency. Last Cr  stable.     Review of Systems  CONSTITUTIONAL: NEGATIVE for fever, chills, change in weight  INTEGUMENTARY/SKIN: NEGATIVE for worrisome rashes, moles or lesions  EYES: NEGATIVE for vision changes or irritation  ENT/MOUTH: NEGATIVE for ear, mouth and throat problems  RESP: NEGATIVE for significant cough or SOB  CV: NEGATIVE for chest pain, palpitations or peripheral edema  GI: NEGATIVE for nausea, abdominal pain, heartburn, or change in bowel habits  : NEGATIVE for frequency, dysuria, or hematuria  MUSCULOSKELETAL:chronic left hip pain  NEURO: NEGATIVE for weakness, dizziness or paresthesias  ENDOCRINE: NEGATIVE for temperature intolerance, skin/hair changes  HEME: NEGATIVE for bleeding problems  PSYCHIATRIC: NEGATIVE for changes in mood or affect    Patient Active Problem List    Diagnosis Date Noted    Carotid stenosis, right 03/24/2023     Priority: Medium    Uncontrolled hypertension 12/08/2022     Priority: Medium    Hypothyroidism, unspecified type 12/08/2022     Priority: Medium    Morbid obesity (H) 12/08/2022     Priority: Medium    Type 2 diabetes mellitus with stage 3 chronic kidney disease, without long-term current use of insulin, unspecified whether stage 3a or 3b CKD (H)      Priority: Medium    Coronary artery disease involving native heart without angina pectoris, unspecified vessel or lesion type 06/27/2022     Priority: Medium    Stage 3a chronic kidney disease (H) 04/18/2022     Priority: Medium      Past Medical History:   Diagnosis Date    Arthritis 2018    L knee, R hip    Basal cell carcinoma     Coronary artery disease involving native heart without angina pectoris, unspecified vessel or lesion type 6/27/2022    Diabetes (H) 2000    Type 2    Hypertension 2000    Thyroid disease 2000    Hypothyroidism     Past Surgical History:   Procedure Laterality Date    BIOPSY  2021    Tongue    COLONOSCOPY  2017    COLONOSCOPY N/A 11/7/2022    Procedure: COLONOSCOPY;  Surgeon: Greer White MD;   Location: [facility]    EYE SURGERY  2019    cataracts    GLOSSECTOMY PARTIAL Right 2021    Procedure: GLOSSECTOMY, PARTIAL;  Surgeon: Rojelio Horton MD;  Location: [facility]    ORTHOPEDIC SURGERY  dec  23, 2020    R Hip     Current Outpatient Medications   Medication Sig Dispense Refill    aspirin (ASA) 81 MG EC tablet Take 81 mg by mouth daily      atenolol (TENORMIN) 25 MG tablet Take 0.5 tablets (12.5 mg) by mouth daily 45 tablet 3    cetirizine (ZYRTEC) 10 MG tablet Take 10 mg by mouth every evening      Glucose Blood (BLOOD GLUCOSE TEST STRIPS) STRP by In Vitro route daily      levothyroxine (SYNTHROID/LEVOTHROID) 137 MCG tablet TAKE 1 TABLET BY MOUTH DAILY. SCHEDULE LAB APPOINTMENT AND WELLNESS EXAM 90 tablet 2    lisinopril (ZESTRIL) 40 MG tablet Take 1 tablet (40 mg) by mouth daily 90 tablet 3    loratadine (CLARITIN) 10 MG tablet Take 10 mg by mouth      furosemide (LASIX) 20 MG tablet Take 20 mg by mouth as needed         Allergies   Allergen Reactions    Amoxicillin GI Disturbance and Nausea     N, V,  D     when     took   for   dental prophylaxis   N, V,  D     when     took   for   dental prophylaxis   N, V,  D     when     took   for   dental prophylaxis     Seasonal Allergies     Simvastatin Muscle Pain (Myalgia)        Social History     Tobacco Use    Smoking status: Former     Packs/day: 1.00     Years: 28.00     Additional pack years: 0.00     Total pack years: 28.00     Types: Cigarettes     Start date: 1964     Quit date: 1984     Years since quittin.8    Smokeless tobacco: Never   Substance Use Topics    Alcohol use: Yes     Comment: one beer or mixed drink a day in warmer weather     Family History   Problem Relation Age of Onset    Arthritis Mother     Cerebrovascular Disease Father     Diabetes Sister      History   Drug Use Unknown         Objective     /88 (BP Location: Right arm, Patient Position: Sitting, Cuff Size: Adult Regular)   Pulse 59   Temp 98.5  F  "(36.9  C) (Tympanic)   Resp 20   Ht 1.797 m (5' 10.75\")   Wt 115.7 kg (255 lb)   SpO2 97%   BMI 35.82 kg/m      Physical Exam  GENERAL APPEARANCE: morbidly obese, alert and no distress; ambulatory w/o assist  EYES: pink conj, no icterus, PERRL, EOMI  HENT: ear canals and TM's normal, nose and mouth without ulcers or lesions, oropharynx clear and oral mucous membranes moist  NECK: no adenopathy, no asymmetry, masses, or scars and thyroid normal to palpation  RESP: lungs clear to auscultation - no rales, rhonchi or wheezes  CV: regular rates and rhythm, normal S1 S2, no S3 or S4, no murmur, click or rub, no peripheral edema and peripheral pulses strong  ABDOMEN: soft, nontender, no hepatosplenomegaly, no masses and bowel sounds normal  MS: no musculoskeletal defects are noted and gait is age appropriate without ataxia  SKIN: good turgor, no rash/jaundice/ecchymosis  NEURO: Normal strength and tone, sensory exam grossly normal, mentation intact and speech normal     Recent Labs   Lab Test 12/08/22  0856 11/25/22  0837 04/18/22  1111   HGB 14.2 14.3  --    PLT  --  226  --    NA  --  139 135   POTASSIUM  --  4.4 4.6   CR  --  1.41* 1.31*   A1C 6.8*  --  6.2*        Diagnostics:  Recent Results (from the past 24 hour(s))   Lipid panel reflex to direct LDL Fasting    Collection Time: 11/13/23  2:45 PM   Result Value Ref Range    Cholesterol 206 (H) <200 mg/dL    Triglycerides 141 <150 mg/dL    Direct Measure HDL 41 >=40 mg/dL    LDL Cholesterol Calculated 137 (H) <=100 mg/dL    Non HDL Cholesterol 165 (H) <130 mg/dL   Hemoglobin    Collection Time: 11/13/23  2:45 PM   Result Value Ref Range    Hemoglobin 14.3 13.3 - 17.7 g/dL   TSH WITH FREE T4 REFLEX    Collection Time: 11/13/23  2:45 PM   Result Value Ref Range    TSH 3.21 0.30 - 4.20 uIU/mL   BASIC METABOLIC PANEL    Collection Time: 11/13/23  2:45 PM   Result Value Ref Range    Sodium 137 135 - 145 mmol/L    Potassium 4.6 3.4 - 5.3 mmol/L    Chloride 100 98 - 107 " mmol/L    Carbon Dioxide (CO2) 26 22 - 29 mmol/L    Anion Gap 11 7 - 15 mmol/L    Urea Nitrogen 21.6 8.0 - 23.0 mg/dL    Creatinine 1.43 (H) 0.67 - 1.17 mg/dL    GFR Estimate 51 (L) >60 mL/min/1.73m2    Calcium 10.0 8.8 - 10.2 mg/dL    Glucose 106 (H) 70 - 99 mg/dL   HEMOGLOBIN A1C    Collection Time: 11/13/23  2:45 PM   Result Value Ref Range    Hemoglobin A1C 6.1 (H) 0.0 - 5.6 %      EKG required for known coronary heart disease and chronic rbbb and not completed in the last 90 days.   EKG: sinus bradycardia, normal axis, normal intervals, no acute ST/T changes c/w ischemia, no LVH by voltage criteria, Right Bundle Branch Block, unchanged from previous tracings    Revised Cardiac Risk Index (RCRI):  The patient has the following serious cardiovascular risks for perioperative complications:   - Coronary Artery Disease (MI, positive stress test, angina, Qs on EKG) = 1 point     RCRI Interpretation: 1 point: Class II (low risk - 0.9% complication rate)         Signed Electronically by: Gilberto Alonzo MD  Copy of this evaluation report is provided to requesting physician.

## 2023-11-13 NOTE — PATIENT INSTRUCTIONS
Preparing for Your Surgery  Getting started  A nurse will call you to review your health history and instructions. They will give you an arrival time based on your scheduled surgery time. Please be ready to share:  Your doctor's clinic name and phone number  Your medical, surgical, and anesthesia history  A list of allergies and sensitivities  A list of medicines, including herbal treatments and over-the-counter drugs  Whether the patient has a legal guardian (ask how to send us the papers in advance)  Please tell us if you're pregnant--or if there's any chance you might be pregnant. Some surgeries may injure a fetus (unborn baby), so they require a pregnancy test. Surgeries that are safe for a fetus don't always need a test, and you can choose whether to have one.   If you have a child who's having surgery, please ask for a copy of Preparing for Your Child's Surgery.    Preparing for surgery  Within 10 to 30 days of surgery: Have a pre-op exam (sometimes called an H&P, or History and Physical). This can be done at a clinic or pre-operative center.  If you're having a , you may not need this exam. Talk to your care team.  At your pre-op exam, talk to your care team about all medicines you take. If you need to stop any medicines before surgery, ask when to start taking them again.  We do this for your safety. Many medicines can make you bleed too much during surgery. Some change how well surgery (anesthesia) drugs work.  Call your insurance company to let them know you're having surgery. (If you don't have insurance, call [phone].)  Call your clinic if there's any change in your health. This includes signs of a cold or flu (sore throat, runny nose, cough, rash, fever). It also includes a scrape or scratch near the surgery site.  If you have questions on the day of surgery, call your hospital or surgery center.  Eating and drinking guidelines  For your safety: Unless your surgeon tells you otherwise,  follow the guidelines below.  Eat and drink as usual until 8 hours before you arrive for surgery. After that, no food or milk.  Drink clear liquids until 2 hours before you arrive. These are liquids you can see through, like water, Gatorade, and Propel Water. They also include plain black coffee and tea (no cream or milk), candy, and breath mints. You can spit out gum when you arrive.  If you drink alcohol: Stop drinking it the night before surgery.  If your care team tells you to take medicine on the morning of surgery, it's okay to take it with a sip of water.  Preventing infection  Shower or bathe the night before and morning of your surgery. Follow the instructions your clinic gave you. (If no instructions, use regular soap.)  Don't shave or clip hair near your surgery site. We'll remove the hair if needed.  Don't smoke or vape the morning of surgery. You may chew nicotine gum up to 2 hours before surgery. A nicotine patch is okay.  Note: Some surgeries require you to completely quit smoking and nicotine. Check with your surgeon.  Your care team will make every effort to keep you safe from infection. We will:  Clean our hands often with soap and water (or an alcohol-based hand rub).  Clean the skin at your surgery site with a special soap that kills germs.  Give you a special gown to keep you warm. (Cold raises the risk of infection.)  Wear special hair covers, masks, gowns and gloves during surgery.  Give antibiotic medicine, if prescribed. Not all surgeries need antibiotics.  What to bring on the day of surgery  Photo ID and insurance card  Copy of your health care directive, if you have one  Glasses and hearing aids (bring cases)  You can't wear contacts during surgery  Inhaler and eye drops, if you use them (tell us about these when you arrive)  CPAP machine or breathing device, if you use them  A few personal items, if spending the night  If you have . . .  A pacemaker, ICD (cardiac defibrillator) or other  implant: Bring the ID card.  An implanted stimulator: Bring the remote control.  A legal guardian: Bring a copy of the certified (court-stamped) guardianship papers.  Please remove any jewelry, including body piercings. Leave jewelry and other valuables at home.  If you're going home the day of surgery  You must have a responsible adult drive you home. They should stay with you overnight as well.  If you don't have someone to stay with you, and you aren't safe to go home alone, we may keep you overnight. Insurance often won't pay for this.  After surgery  If it's hard to control your pain or you need more pain medicine, please call your surgeon's office.  Questions?   If you have any questions for your care team, list them here: _________________________________________________________________________________________________________________________________________________________________________ ____________________________________ ____________________________________ ____________________________________  For informational purposes only. Not to replace the advice of your health care provider. Copyright   2003, 2019 [employer] [facility]. All rights reserved. Clinically reviewed by Halima Celestin MD. [employer] 509246 - REV 12/22.    How to Take Your Medication Before Surgery  - Take all of your medications before surgery except as noted below  - HOLD (do not take) Aspirin for 7 days before surgery  - HOLD (do not take) lisinopril on day of surgery  - STOP taking all vitamins and herbal supplements 14 days before surgery.    Do not take Ibuprofen, Aspirin or Naproxen from now until after procedure.  If you need to take something for pain, take Acetaminophen 325 mg orally 1-2 tabs every 4-6 hrs as needed for pain     EKG today showed same result as last year. Additional heart testing is not necessary.    You will be contacted in 1-2 days for results of your lab tests.

## 2023-11-22 ENCOUNTER — PATIENT OUTREACH (OUTPATIENT)
Dept: CARE COORDINATION | Facility: CLINIC | Age: 78
End: 2023-11-22
Payer: COMMERCIAL

## 2023-12-04 DIAGNOSIS — E03.9 HYPOTHYROIDISM, UNSPECIFIED TYPE: ICD-10-CM

## 2023-12-04 RX ORDER — LEVOTHYROXINE SODIUM 137 UG/1
TABLET ORAL
Qty: 90 TABLET | Refills: 2 | Status: SHIPPED | OUTPATIENT
Start: 2023-12-04 | End: 2024-08-21

## 2023-12-19 ENCOUNTER — TRANSFERRED RECORDS (OUTPATIENT)
Dept: HEALTH INFORMATION MANAGEMENT | Facility: CLINIC | Age: 78
End: 2023-12-19
Payer: COMMERCIAL

## 2023-12-26 ENCOUNTER — PATIENT OUTREACH (OUTPATIENT)
Dept: CARE COORDINATION | Facility: CLINIC | Age: 78
End: 2023-12-26
Payer: COMMERCIAL

## 2024-01-09 ENCOUNTER — PATIENT OUTREACH (OUTPATIENT)
Dept: CARE COORDINATION | Facility: CLINIC | Age: 79
End: 2024-01-09
Payer: COMMERCIAL

## 2024-01-16 ENCOUNTER — TRANSFERRED RECORDS (OUTPATIENT)
Dept: HEALTH INFORMATION MANAGEMENT | Facility: CLINIC | Age: 79
End: 2024-01-16
Payer: COMMERCIAL

## 2024-02-25 ENCOUNTER — HEALTH MAINTENANCE LETTER (OUTPATIENT)
Age: 79
End: 2024-02-25

## 2024-05-14 ENCOUNTER — TRANSFERRED RECORDS (OUTPATIENT)
Dept: MULTI SPECIALTY CLINIC | Facility: CLINIC | Age: 79
End: 2024-05-14

## 2024-05-14 LAB — RETINOPATHY: NEGATIVE

## 2024-06-05 ENCOUNTER — PATIENT OUTREACH (OUTPATIENT)
Dept: CARE COORDINATION | Facility: CLINIC | Age: 79
End: 2024-06-05
Payer: COMMERCIAL

## 2024-06-15 SDOH — HEALTH STABILITY: PHYSICAL HEALTH: ON AVERAGE, HOW MANY MINUTES DO YOU ENGAGE IN EXERCISE AT THIS LEVEL?: 90 MIN

## 2024-06-15 SDOH — HEALTH STABILITY: PHYSICAL HEALTH: ON AVERAGE, HOW MANY DAYS PER WEEK DO YOU ENGAGE IN MODERATE TO STRENUOUS EXERCISE (LIKE A BRISK WALK)?: 3 DAYS

## 2024-06-15 ASSESSMENT — SOCIAL DETERMINANTS OF HEALTH (SDOH): HOW OFTEN DO YOU GET TOGETHER WITH FRIENDS OR RELATIVES?: PATIENT DECLINED

## 2024-06-20 ENCOUNTER — OFFICE VISIT (OUTPATIENT)
Dept: FAMILY MEDICINE | Facility: CLINIC | Age: 79
End: 2024-06-20
Payer: COMMERCIAL

## 2024-06-20 VITALS
HEART RATE: 52 BPM | SYSTOLIC BLOOD PRESSURE: 170 MMHG | RESPIRATION RATE: 16 BRPM | BODY MASS INDEX: 34.81 KG/M2 | OXYGEN SATURATION: 98 % | WEIGHT: 257 LBS | DIASTOLIC BLOOD PRESSURE: 90 MMHG | TEMPERATURE: 97.7 F | HEIGHT: 72 IN

## 2024-06-20 DIAGNOSIS — E66.01 CLASS 2 SEVERE OBESITY WITH SERIOUS COMORBIDITY AND BODY MASS INDEX (BMI) OF 35.0 TO 35.9 IN ADULT, UNSPECIFIED OBESITY TYPE (H): ICD-10-CM

## 2024-06-20 DIAGNOSIS — E66.812 CLASS 2 SEVERE OBESITY WITH SERIOUS COMORBIDITY AND BODY MASS INDEX (BMI) OF 35.0 TO 35.9 IN ADULT, UNSPECIFIED OBESITY TYPE (H): ICD-10-CM

## 2024-06-20 DIAGNOSIS — D63.1 ANEMIA DUE TO STAGE 3A CHRONIC KIDNEY DISEASE (H): ICD-10-CM

## 2024-06-20 DIAGNOSIS — N18.31 ANEMIA DUE TO STAGE 3A CHRONIC KIDNEY DISEASE (H): ICD-10-CM

## 2024-06-20 DIAGNOSIS — Z29.11 NEED FOR VACCINATION AGAINST RESPIRATORY SYNCYTIAL VIRUS: ICD-10-CM

## 2024-06-20 DIAGNOSIS — Z00.00 ENCOUNTER FOR MEDICARE ANNUAL WELLNESS EXAM: Primary | ICD-10-CM

## 2024-06-20 DIAGNOSIS — M25.562 CHRONIC KNEE PAIN AFTER TOTAL REPLACEMENT OF LEFT KNEE JOINT: ICD-10-CM

## 2024-06-20 DIAGNOSIS — G89.29 CHRONIC KNEE PAIN AFTER TOTAL REPLACEMENT OF LEFT KNEE JOINT: ICD-10-CM

## 2024-06-20 DIAGNOSIS — N18.30 TYPE 2 DIABETES MELLITUS WITH STAGE 3 CHRONIC KIDNEY DISEASE, WITHOUT LONG-TERM CURRENT USE OF INSULIN, UNSPECIFIED WHETHER STAGE 3A OR 3B CKD (H): ICD-10-CM

## 2024-06-20 DIAGNOSIS — I10 UNCONTROLLED HYPERTENSION: ICD-10-CM

## 2024-06-20 DIAGNOSIS — Z96.652 CHRONIC KNEE PAIN AFTER TOTAL REPLACEMENT OF LEFT KNEE JOINT: ICD-10-CM

## 2024-06-20 DIAGNOSIS — Z23 NEED FOR SHINGLES VACCINE: ICD-10-CM

## 2024-06-20 DIAGNOSIS — E11.29 MICROALBUMINURIA DUE TO TYPE 2 DIABETES MELLITUS (H): ICD-10-CM

## 2024-06-20 DIAGNOSIS — R80.9 MICROALBUMINURIA DUE TO TYPE 2 DIABETES MELLITUS (H): ICD-10-CM

## 2024-06-20 DIAGNOSIS — N18.31 CKD STAGE 3A, GFR 45-59 ML/MIN (H): ICD-10-CM

## 2024-06-20 DIAGNOSIS — E11.22 TYPE 2 DIABETES MELLITUS WITH STAGE 3 CHRONIC KIDNEY DISEASE, WITHOUT LONG-TERM CURRENT USE OF INSULIN, UNSPECIFIED WHETHER STAGE 3A OR 3B CKD (H): ICD-10-CM

## 2024-06-20 DIAGNOSIS — I89.0 LYMPHEDEMA: ICD-10-CM

## 2024-06-20 LAB
CREAT UR-MCNC: 62.3 MG/DL
HBA1C MFR BLD: 6.5 % (ref 0–5.6)
MICROALBUMIN UR-MCNC: 286.3 MG/L
MICROALBUMIN/CREAT UR: 459.55 MG/G CR (ref 0–17)

## 2024-06-20 PROCEDURE — 36415 COLL VENOUS BLD VENIPUNCTURE: CPT | Performed by: FAMILY MEDICINE

## 2024-06-20 PROCEDURE — 99207 PR FOOT EXAM NO CHARGE: CPT | Performed by: FAMILY MEDICINE

## 2024-06-20 PROCEDURE — 99214 OFFICE O/P EST MOD 30 MIN: CPT | Mod: 25 | Performed by: FAMILY MEDICINE

## 2024-06-20 PROCEDURE — 82043 UR ALBUMIN QUANTITATIVE: CPT | Performed by: FAMILY MEDICINE

## 2024-06-20 PROCEDURE — 82570 ASSAY OF URINE CREATININE: CPT | Performed by: FAMILY MEDICINE

## 2024-06-20 PROCEDURE — 83036 HEMOGLOBIN GLYCOSYLATED A1C: CPT | Performed by: FAMILY MEDICINE

## 2024-06-20 PROCEDURE — G0439 PPPS, SUBSEQ VISIT: HCPCS | Performed by: FAMILY MEDICINE

## 2024-06-20 RX ORDER — RESPIRATORY SYNCYTIAL VIRUS VACCINE 120MCG/0.5
0.5 KIT INTRAMUSCULAR ONCE
Qty: 1 EACH | Refills: 0 | Status: CANCELLED | OUTPATIENT
Start: 2024-06-20 | End: 2024-06-20

## 2024-06-20 RX ORDER — ATENOLOL 25 MG/1
12.5 TABLET ORAL DAILY
Qty: 45 TABLET | Refills: 3 | Status: SHIPPED | OUTPATIENT
Start: 2024-06-20

## 2024-06-20 RX ORDER — HYDROCHLOROTHIAZIDE 12.5 MG/1
12.5 TABLET ORAL DAILY
Qty: 90 TABLET | Refills: 0 | Status: SHIPPED | OUTPATIENT
Start: 2024-06-20 | End: 2024-09-07

## 2024-06-20 ASSESSMENT — PAIN SCALES - GENERAL: PAINLEVEL: MILD PAIN (2)

## 2024-06-20 NOTE — PROGRESS NOTES
Preventive Care Visit  [facility]  Gilberto Alonzo MD, Family Medicine  Jun 20, 2024      Assessment & Plan     Encounter for Medicare annual wellness exam  Patient was advised on recommended screening and preventive health recommendations.  He verbalized understanding and agreed to the plans below.      Uncontrolled hypertension  Reviewed his meds and medical history.   Maxed on ACE-I, and already bradycardic so unable to increase beta blocker.  Cannot use amlodipine due to lymphedema.  Try low dose thiazide diuretic first. Discussed possible worsening of CKD with diuretic but at standard doses and close surveillance, may be able be used.  Reinforced sodium restrictions.  Recheck with RN in a week.  Consider work up for secondary hypertension if continues to have significant elevation  - atenolol (TENORMIN) 25 MG tablet  Dispense: 45 tablet; Refill: 3  - hydroCHLOROthiazide 12.5 MG tablet  Dispense: 90 tablet; Refill: 0  - Basic metabolic panel    Type 2 diabetes mellitus with stage 3 chronic kidney disease, without long-term current use of insulin, unspecified whether stage 3a or 3b CKD (H)  Reinforced carbohydrate control.  Regular exercise 30 mins per day, 3 times a week.  Regular foot care, annual eye exam.  Flu vaccine every year.   Needs new A1c.  - Albumin Random Urine Quantitative with Creat Ratio  - Hemoglobin A1c  - FOOT EXAM  - Albumin Random Urine Quantitative with Creat Ratio    Class 2 severe obesity with serious comorbidity and body mass index (BMI) of 35.0 to 35.9 in adult, unspecified obesity type (H)  Increases complexity of management of the above chronic conditions.  Patient was advised healthy diet recommendations.  Patient was advised weekly exercise recommendations and goals.     Need for shingles vaccine  Patient to check with insurance about coverage     Need for vaccination against respiratory syncytial virus  Patient will get vaccine from pharmacy for  "better insurance coverage.     Lymphedema  Due to less flexibility, has been unable to use compression stockings given before.  Patient agreed to see lymphedema clinic again - understands risks of persistent edema.  - hydroCHLOROthiazide 12.5 MG tablet  Dispense: 90 tablet; Refill: 0  - Lymphedema Therapy  Referral    CKD stage 3a, GFR 45-59 ml/min (H)  Has been stable on previous labs.  Due to addition of hydrochlorothiazide above, recheck BMP in 1 month.  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  Patient agreed to try formal physical therapy.  I do not suspect instability nor hardware dysfunction so deferred imaging. Patient agrees.  Consider imaging if worsening pain or significant limitation of ambulation.  - Physical Therapy  Referral      Patient has been advised of split billing requirements and indicates understanding: Yes        BMI  Estimated body mass index is 35.1 kg/m  as calculated from the following:    Height as of this encounter: 1.822 m (5' 11.75\").    Weight as of this encounter: 116.6 kg (257 lb).   Weight management plan: Discussed healthy diet and exercise guidelines    Counseling  Appropriate preventive services were discussed with this patient, including applicable screening as appropriate for fall prevention, nutrition, physical activity, Tobacco-use cessation, weight loss and cognition.  Checklist reviewing preventive services available has been given to the patient.  Reviewed patient's diet, addressing concerns and/or questions.   He is at risk for lack of exercise and has been provided with information to increase physical activity for the benefit of his well-being.   The patient was instructed to see the dentist every 6 months.   The patient was provided with written information regarding signs of hearing loss.       Patient Instructions   Start " hydrochlorothiazide 12.5 mg daily to help reduce blood pressure.  Continue all other medications without change.  1 week BP check with a nurse    Be consistent with low salt, low trans fat and low saturated fat diet.  Eat food rich in omega-3-fatty acids as you tolerate. (salmon, olive oil)  Eat 5 cups of vegetables, fruits and whole grains per day.  Limit starchy food (white rice, white bread, white pasta, white potatoes) to less than a cup per meal.  Minimize sweets, junk food and fastfood. Limit soda beverages to one serving per day; best to avoid it altogether though.    Exercise: moderate intensity sustained for at least 30 mins per episode, goal of 150 mins per week at least  Combine cardiovascular and resistance exercises.  These exercise recommendations are in addition to your daily activity at work or home.  Work on losing weight.      Regular foot care; don't walk barefoot  Annual eye exam.  Please request your eye doctor to furnish a copy of the eye exam report to the clinic to be placed in your record.  Flu vaccine by the end of October yearly.  Be sure to update any other recommended vaccinations for diabetics.    Blood tests: You will be contacted in 1-2 days for results of your lab tests.     You may get the Shingrix vaccine for shingles if you desire, and after you verify with insurance how they cover the vaccine.   Update covid19 vaccine soon.  RSV and flu shots in the fall.    Referrals to lymphedema and physical therapy clinics have been signed. Schedulers will call you in the next 3-5 business days.     Preventative Care Visits include: Yearly physicals, Well-child visits, Welcome to Medicare visits, & Medicare yearly wellness exams.    The purpose of these visits is to discuss your medical history and prevent health problems before you are sick.  You may need to pay a copay, coinsurance or deductible if your visit today includes testing or treating for a new or existing condition.    Additional  "charges may be incurred for today's visit. If you have questions about what your insurance plan covers, please contact your Insurance Company's member service department.  If you have questions specific to a bill you have already received from [employer], please contact the [employer] Billing Office at [phone].       Patient Education  Preventive Care Advice   This is general advice we often give to help people stay healthy. Your care team may have specific advice just for you. Please talk to your care team about your own preventive care needs.  Lifestyle  Exercise at least 150 minutes each week (30 minutes a day, 5 days a week).  Do muscle strengthening activities 2 days a week. These help control your weight and prevent disease.  No smoking.  Wear sunscreen to prevent skin cancer.  Have your home tested for radon every 2 to 5 years. Radon is a colorless, odorless gas that can harm your lungs. To learn more, go to www.health.[facility].mn. and search for \"Radon in Homes.\"  Keep guns unloaded and locked up in a safe place like a safe or gun vault, or, use a gun lock and hide the keys. Always lock away bullets separately. To learn more, visit [place].gov and search for \"safe gun storage.\"  Nutrition  Eat 5 or more servings of fruits and vegetables each day.  Try wheat bread, brown rice and whole grain pasta (instead of white bread, rice, and pasta).  Get enough calcium and vitamin D. Check the label on foods and aim for 100% of the RDA (recommended daily allowance).  Regular exams  Have a dental exam and cleaning every 6 months.  See your health care team every year to talk about:  Any changes in your health.  Any medicines your care team has prescribed.  Preventive care, family planning, and ways to prevent chronic diseases.  Shots (vaccines)   HPV shots (up to age 26), if you've never had them before.  Hepatitis B shots (up to age 59), if you've never had them before.  COVID-19 shot: Get this shot when it's due.  Flu " shot: Get a flu shot every year.  Tetanus shot: Get a tetanus shot every 10 years.  Pneumococcal, hepatitis A, and RSV shots: Ask your care team if you need these based on your risk.  Shingles shot (for age 50 and up).  General health tests  Diabetes screening:  Starting at age 35, Get screened for diabetes at least every 3 years.  If you are younger than age 35, ask your care team if you should be screened for diabetes.  Cholesterol test: At age 39, start having a cholesterol test every 5 years, or more often if advised.  Bone density scan (DEXA): At age 50, ask your care team if you should have this scan for osteoporosis (brittle bones).  Hepatitis C: Get tested at least once in your life.  Abdominal aortic aneurysm screening: Talk to your doctor about having this screening if you:  Have ever smoked; and  Are biologically male; and  Are between the ages of 65 and 75.  STIs (sexually transmitted infections)  Before age 24: Ask your care team if you should be screened for STIs.  After age 24: Get screened for STIs if you're at risk. You are at risk for STIs (including HIV) if:  You are sexually active with more than one person.  You don't use condoms every time.  You or a partner was diagnosed with a sexually transmitted infection.  If you are at risk for HIV, ask about PrEP medicine to prevent HIV.  Get tested for HIV at least once in your life, whether you are at risk for HIV or not.  Cancer screening tests  Cervical cancer screening: If you have a cervix, begin getting regular cervical cancer screening tests at age 21. Most people who have regular screenings with normal results can stop after age 65. Talk about this with your provider.  Breast cancer scan (mammogram): If you've ever had breasts, begin having regular mammograms starting at age 40. This is a scan to check for breast cancer.  Colon cancer screening: It is important to start screening for colon cancer at age 45.  Have a colonoscopy test every 10  years (or more often if you're at risk) Or, ask your provider about stool tests like a FIT test every year or Cologuard test every 3 years.  To learn more about your testing options, visit: www.[employer]ifixious/729262.pdf.  For help making a decision, visit: gurmeet/cy80091.  Prostate cancer screening test: If you have a prostate and are age 55 to 69, ask your provider if you would benefit from a yearly prostate cancer screening test.  Lung cancer screening: If you are a current or former smoker age 50 to 80, ask your care team if ongoing lung cancer screenings are right for you.  For informational purposes only. Not to replace the advice of your health care provider. Copyright   2023 [employer] [place] [employer]. All rights reserved. Clinically reviewed by the [facility] Transitions Program. [employer] 314887 - REV 04/24.  Preventing Falls: Care Instructions  Injuries and health problems such as trouble walking or poor eyesight can increase your risk of falling. So can some medicines. But there are things you can do to help prevent falls. You can exercise to get stronger. You can also arrange your home to make it safer.    Talk to your doctor about the medicines you take. Ask if any of them increase the risk of falls and whether they can be changed or stopped.   Try to exercise regularly. It can help improve your strength and balance. This can help lower your risk of falling.     Practice fall safety and prevention.    Wear low-heeled shoes that fit well and give your feet good support. Talk to your doctor if you have foot problems that make this hard.  Carry a cellphone or wear a medical alert device that you can use to call for help.  Use stepladders instead of chairs to reach high objects. Don't climb if you're at risk for falls. Ask for help, if needed.  Wear the correct eyeglasses, if you need them.    Make your home safer.    Remove rugs, cords, clutter, and furniture from walkways.  Keep your house well lit.  "Use night-lights in hallways and bathrooms.  Install and use sturdy handrails on stairways.  Wear nonskid footwear, even inside. Don't walk barefoot or in socks without shoes.    Be safe outside.    Use handrails, curb cuts, and ramps whenever possible.  Keep your hands free by using a shoulder bag or backpack.  Try to walk in well-lit areas. Watch out for uneven ground, changes in pavement, and debris.  Be careful in the winter. Walk on the grass or gravel when sidewalks are slippery. Use de-icer on steps and walkways. Add non-slip devices to shoes.    Put grab bars and nonskid mats in your shower or tub and near the toilet. Try to use a shower chair or bath bench when bathing.   Get into a tub or shower by putting in your weaker leg first. Get out with your strong side first. Have a phone or medical alert device in the bathroom with you.   Where can you learn more?  Go to https://www.[employer].net/patiented  Enter G117 in the search box to learn more about \"Preventing Falls: Care Instructions.\"  Current as of: July 17, 2023               Content Version: 14.0    3736-0401 [employer].   Care instructions adapted under license by your healthcare professional. If you have questions about a medical condition or this instruction, always ask your healthcare professional. [employer] disclaims any warranty or liability for your use of this information.      Hearing Loss: Care Instructions  Overview     Hearing loss is a sudden or slow decrease in how well you hear. It can range from slight to profound. Permanent hearing loss can occur with aging. It also can happen when you are exposed long-term to loud noise. Examples include listening to loud music, riding motorcycles, or being around other loud machines.  Hearing loss can affect your work and home life. It can make you feel lonely or depressed. You may feel that you have lost your independence. But hearing aids and other devices can help " you hear better and feel connected to others.  Follow-up care is a key part of your treatment and safety. Be sure to make and go to all appointments, and call your doctor if you are having problems. It's also a good idea to know your test results and keep a list of the medicines you take.  How can you care for yourself at home?  Avoid loud noises whenever possible. This helps keep your hearing from getting worse.  Always wear hearing protection around loud noises.  Wear a hearing aid as directed.  A professional can help you pick a hearing aid that will work best for you.  You can also get hearing aids over the counter for mild to moderate hearing loss.  Have hearing tests as your doctor suggests. They can show whether your hearing has changed. Your hearing aid may need to be adjusted.  Use other devices as needed. These may include:  Telephone amplifiers and hearing aids that can connect to a television, stereo, radio, or microphone.  Devices that use lights or vibrations. These alert you to the doorbell, a ringing telephone, or a baby monitor.  Television closed-captioning. This shows the words at the bottom of the screen. Most new TVs can do this.  TTY (text telephone). This lets you type messages back and forth on the telephone instead of talking or listening. These devices are also called TDD. When messages are typed on the keyboard, they are sent over the phone line to a receiving TTY. The message is shown on a monitor.  Use text messaging, social media, and email if it is hard for you to communicate by telephone.  Try to learn a listening technique called speechreading. It is not lipreading. You pay attention to people's gestures, expressions, posture, and tone of voice. These clues can help you understand what a person is saying. Face the person you are talking to, and have them face you. Make sure the lighting is good. You need to see the other person's face clearly.  Think about counseling if you need help  "to adjust to your hearing loss.  When should you call for help?  Watch closely for changes in your health, and be sure to contact your doctor if:    You think your hearing is getting worse.     You have new symptoms, such as dizziness or nausea.   Where can you learn more?  Go to https://www.[employer].net/patiented  Enter R798 in the search box to learn more about \"Hearing Loss: Care Instructions.\"  Current as of: September 27, 2023               Content Version: 14.0    7218-2422 [email or web address].   Care instructions adapted under license by your healthcare professional. If you have questions about a medical condition or this instruction, always ask your healthcare professional. [employer] disclaims any warranty or liability for your use of this information.      Substance Use Disorder: Care Instructions  Overview     You can improve your life and health by stopping your use of alcohol or drugs. When you don't drink or use drugs, you may feel and sleep better. You may get along better with your family, friends, and coworkers. There are medicines and programs that can help with substance use disorder.  How can you care for yourself at home?  Here are some ways to help you stay sober and prevent relapse.  If you have been given medicine to help keep you sober or reduce your cravings, be sure to take it exactly as prescribed.  Talk to your doctor about programs that can help you stop using drugs or drinking alcohol.  Do not keep alcohol or drugs in your home.  Plan ahead. Think about what you'll say if other people ask you to drink or use drugs. Try not to spend time with people who drink or use drugs.  Use the time and money spent on drinking or drugs to do something that's important to you.  Preventing a relapse  Have a plan to deal with relapse. Learn to recognize changes in your thinking that lead you to drink or use drugs. Get help before you start to drink or use drugs again.  Try to stay " away from situations, friends, or places that may lead you to drink or use drugs.  If you feel the need to drink alcohol or use drugs again, seek help right away. Call a trusted friend or family member. Some people get support from organizations such as Narcotics Anonymous or [facility] or from treatment facilities.  If you relapse, get help as soon as you can. Some people make a plan with another person that outlines what they want that person to do for them if they relapse. The plan usually includes how to handle the relapse and who to notify in case of relapse.  Don't give up. Remember that a relapse doesn't mean that you have failed. Use the experience to learn the triggers that lead you to drink or use drugs. Then quit again. Recovery is a lifelong process. Many people have several relapses before they are able to quit for good.  Follow-up care is a key part of your treatment and safety. Be sure to make and go to all appointments, and call your doctor if you are having problems. It's also a good idea to know your test results and keep a list of the medicines you take.  When should you call for help?   Call 911  anytime you think you may need emergency care. For example, call if you or someone else:    Has overdosed or has withdrawal signs. Be sure to tell the emergency workers that you are or someone else is using or trying to quit using drugs. Overdose or withdrawal signs may include:  Losing consciousness.  Seizure.  Seeing or hearing things that aren't there (hallucinations).     Is thinking or talking about suicide or harming others.   Where to get help 24 hours a day, 7 days a week   If you or someone you know talks about suicide, self-harm, a mental health crisis, a substance use crisis, or any other kind of emotional distress, get help right away. You can:    Call the Suicide and Crisis Lifeline at 430.     Call 4-330-558-TALK (1-594.353.5818).     Text HOME to 681429 to access the Crisis Text Line.  "  Consider saving these numbers in your phone.  Go to [employer] for more information or to chat online.  Call your doctor now or seek immediate medical care if:    You are having withdrawal symptoms. These may include nausea or vomiting, sweating, shakiness, and anxiety.   Watch closely for changes in your health, and be sure to contact your doctor if:    You have a relapse.     You need more help or support to stop.   Where can you learn more?  Go to https://www.[employer].net/patiented  Enter H573 in the search box to learn more about \"Substance Use Disorder: Care Instructions.\"  Current as of: November 15, 2023               Content Version: 14.0    3397-1696 [employer].   Care instructions adapted under license by your healthcare professional. If you have questions about a medical condition or this instruction, always ask your healthcare professional. [employer] disclaims any warranty or liability for your use of this information.           Caroline Salvador is a 78 year old, presenting for the following:  Physical, Recheck Medication, Hypertension, Diabetes, Lipids, and Thyroid Problem        6/20/2024     1:13 PM   Additional Questions   Roomed by Tyra ESTRADA MA   Accompanied by self         6/20/2024     1:13 PM   Patient Reported Additional Medications   Patient reports taking the following new medications none         Via the Health Maintenance questionnaire, the patient has reported the following services have been completed -Eye Exam: [facility] Hein in [facility] 2024-05-14, this information has been sent to the abstraction team.  Health Care Directive  Patient does not have a Health Care Directive or Living Will: Patient states has Advance Directive and will bring in a copy to clinic.    HPI      Diabetes Follow-up    How often are you checking your blood sugar? A few times a week  What time of day are you checking your blood sugars (select all that apply)?  " After meals  Have you had any blood sugars above 200?  No  Have you had any blood sugars below 70?  No  What symptoms do you notice when your blood sugar is low?  None  What concerns do you have today about your diabetes? None   Do you have any of these symptoms? (Select all that apply)  No numbness or tingling in feet.  No redness, sores or blisters on feet.  No complaints of excessive thirst.  No reports of blurry vision.  No significant changes to weight.            Hypertension Follow-up    Do you check your blood pressure regularly outside of the clinic? Yes   Are you following a low salt diet? No, reduced salt intake  Are your blood pressures ever more than 140 on the top number (systolic) OR more   than 90 on the bottom number (diastolic), for example 140/90? Yes    BP Readings from Last 2 Encounters:   06/20/24 (!) 182/90   11/13/23 128/88     Hemoglobin A1C (%)   Date Value   11/13/2023 6.1 (H)   12/08/2022 6.8 (H)     LDL Cholesterol Calculated (mg/dL)   Date Value   11/13/2023 137 (H)   01/05/2023 123 (H)       Hypothyroidism Follow-up    Since last visit, patient describes the following symptoms: Weight stable, no hair loss, no skin changes, no constipation, no loose stools      Denies chest pain, dyspnea, HA, BOV, dizziness or urinary changes.     Patient reports difficulty in getting up from kneeling due to pain. Persistent in spite of knee and hip replacement on the left.  Never did formal physical therapy.          6/15/2024   General Health   How would you rate your overall physical health? (!) FAIR   Feel stress (tense, anxious, or unable to sleep) Not at all            6/15/2024   Nutrition   Diet: Regular (no restrictions)            6/15/2024   Exercise   Days per week of moderate/strenous exercise 3 days   Average minutes spent exercising at this level 90 min            6/15/2024   Social Factors   Frequency of gathering with friends or relatives Patient declined   Worry food won't last until  get money to buy more No   Food not last or not have enough money for food? No   Do you have housing? (Housing is defined as stable permanent housing and does not include staying ouside in a car, in a tent, in an abandoned building, in an overnight shelter, or couch-surfing.) Yes   Are you worried about losing your housing? No   Lack of transportation? No   Unable to get utilities (heat,electricity)? No            6/20/2024   Fall Risk   Gait Speed Test (Document in seconds) 4.31   Gait Speed Test Interpretation Less than or equal to 5.00 seconds - PASS               6/15/2024   Activities of Daily Living- Home Safety   Needs help with the following daily activites None of the above   Safety concerns in the home None of the above            6/15/2024   Dental   Dentist two times every year? (!) NO            6/15/2024   Hearing Screening   Hearing concerns? (!) I FEEL THAT PEOPLE ARE MUMBLING OR NOT SPEAKING CLEARLY.    (!) I NEED TO ASK PEOPLE TO SPEAK UP OR REPEAT THEMSELVES.    (!) IT'S HARDER TO UNDERSTAND WOMEN'S VOICES THAN MEN'S VOICES.    (!) IT'S HARD TO FOLLOW A CONVERSATION IN A NOISY RESTAURANT OR CROWDED ROOM.    (!) TROUBLE UNDERSTANDING SOFT OR WHISPERED SPEECH.    (!) TROUBLE UNDERSTANDING SPEECH ON THE TELEPHONE       Multiple values from one day are sorted in reverse-chronological order         6/15/2024   Driving Risk Screening   Patient/family members have concerns about driving No            6/15/2024   General Alertness/Fatigue Screening   Have you been more tired than usual lately? No            6/15/2024   Urinary Incontinence Screening   Bothered by leaking urine in past 6 months No            6/15/2024   TB Screening   Were you born outside of the [place]? No            Today's PHQ-2 Score:       6/19/2024     1:44 PM   PHQ-2 ( 1999 [employer])   Q1: Little interest or pleasure in doing things 0   Q2: Feeling down, depressed or hopeless 0   PHQ-2 Score 0   Q1: Little interest or pleasure in doing  things Not at all   Q2: Feeling down, depressed or hopeless Not at all   PHQ-2 Score 0           6/15/2024   Substance Use   Alcohol more than 3/day or more than 7/wk No   Do you have a current opioid prescription? No   How severe/bad is pain from 1 to 10? 2/10   Do you use any other substances recreationally? (!) OTHER        Social History     Tobacco Use    Smoking status: Former     Current packs/day: 0.00     Average packs/day: 1 pack/day for 28.0 years (28.0 ttl pk-yrs)     Types: Cigarettes     Start date: 1964     Quit date: 1984     Years since quittin.4    Smokeless tobacco: Never   Vaping Use    Vaping status: Never Used   Substance Use Topics    Alcohol use: Yes     Comment: one beer or mixed drink a day in warmer weather    Drug use: Never       ASCVD Risk   The 10-year ASCVD risk score (Jenelle SHETH, et al., 2019) is: 77.4%    Values used to calculate the score:      Age: 78 years      Sex: Male      Is Non- : No      Diabetic: Yes      Tobacco smoker: No      Systolic Blood Pressure: 182 mmHg      Is BP treated: Yes      HDL Cholesterol: 41 mg/dL      Total Cholesterol: 206 mg/dL            Reviewed and updated as needed this visit by Provider                    Past Medical History:   Diagnosis Date    Arthritis 2018    L knee, R hip    Basal cell carcinoma     Coronary artery disease involving native heart without angina pectoris, unspecified vessel or lesion type 2022    Diabetes (H) 2000    Type 2    Hypertension 2000    Thyroid disease     Hypothyroidism     Past Surgical History:   Procedure Laterality Date    BIOPSY      Tongue    COLONOSCOPY  2017    COLONOSCOPY N/A 2022    Procedure: COLONOSCOPY;  Surgeon: Greer White MD;  Location: [facility]    EYE SURGERY  2019    cataracts    GLOSSECTOMY PARTIAL Right 2021    Procedure: GLOSSECTOMY, PARTIAL;  Surgeon: Rojelio Horton MD;  Location: [facility]    ORTHOPEDIC SURGERY  dec  23, 2020     R Hip     Patient Active Problem List   Diagnosis    Stage 3a chronic kidney disease (H)    Coronary artery disease involving native heart without angina pectoris, unspecified vessel or lesion type    Type 2 diabetes mellitus with stage 3 chronic kidney disease, without long-term current use of insulin, unspecified whether stage 3a or 3b CKD (H)    Uncontrolled hypertension    Hypothyroidism, unspecified type    Morbid obesity (H)    Carotid stenosis, right     Past Surgical History:   Procedure Laterality Date    BIOPSY      Tongue    COLONOSCOPY      COLONOSCOPY N/A 2022    Procedure: COLONOSCOPY;  Surgeon: Greer White MD;  Location: [facility]    EYE SURGERY  2019    cataracts    GLOSSECTOMY PARTIAL Right 2021    Procedure: GLOSSECTOMY, PARTIAL;  Surgeon: Rojelio Horton MD;  Location: [facility]    ORTHOPEDIC SURGERY  dec  23, 2020    R Hip       Social History     Tobacco Use    Smoking status: Former     Current packs/day: 0.00     Average packs/day: 1 pack/day for 28.0 years (28.0 ttl pk-yrs)     Types: Cigarettes     Start date: 1964     Quit date: 1984     Years since quittin.4    Smokeless tobacco: Never   Substance Use Topics    Alcohol use: Yes     Comment: one beer or mixed drink a day in warmer weather     Family History   Problem Relation Age of Onset    Arthritis Mother     Cerebrovascular Disease Father     Diabetes Sister          Current Outpatient Medications   Medication Sig Dispense Refill    aspirin (ASA) 81 MG EC tablet Take 81 mg by mouth daily      atenolol (TENORMIN) 25 MG tablet Take 0.5 tablets (12.5 mg) by mouth daily 45 tablet 3    cetirizine (ZYRTEC) 10 MG tablet Take 10 mg by mouth every evening      Glucose Blood (BLOOD GLUCOSE TEST STRIPS) STRP by In Vitro route daily      levothyroxine (SYNTHROID/LEVOTHROID) 137 MCG tablet TAKE 1 TABLET BY MOUTH DAILY.  WELLNESS EXAM 90 tablet 2    lisinopril (ZESTRIL) 40 MG tablet Take 1 tablet (40 mg) by mouth daily 90  tablet 3    loratadine (CLARITIN) 10 MG tablet Take 10 mg by mouth      furosemide (LASIX) 20 MG tablet Take 20 mg by mouth as needed       Allergies   Allergen Reactions    Amoxicillin GI Disturbance and Nausea     N, V,  D   6/21  when     took   for   dental prophylaxis   N, V,  D   6/21  when     took   for   dental prophylaxis   N, V,  D   6/21  when     took   for   dental prophylaxis     Seasonal Allergies     Simvastatin Muscle Pain (Myalgia)     Current providers sharing in care for this patient include:  Patient Care Team:  Gilberto Alonzo MD as PCP - General (Family Medicine)  Gilberto Alonzo MD as Assigned PCP  Case Alexander MD as MD (Cardiovascular Disease)  Patti Holland MD as MD (Cardiovascular Disease)  Dilan Simms MD as Assigned Neuroscience Provider  Jan Chavira AuD as Audiologist (Audiology)  Rojelio Horton MD as Assigned Surgical Provider    The following health maintenance items are reviewed in Epic and correct as of today:  Health Maintenance   Topic Date Due    RSV VACCINE (Pregnancy & 60+) (1 - 1-dose 60+ series) Never done    ZOSTER IMMUNIZATION (2 of 3) 05/25/2016    EYE EXAM  03/31/2023    MEDICARE ANNUAL WELLNESS VISIT  12/08/2023    MICROALBUMIN  01/05/2024    DIABETIC FOOT EXAM  01/26/2024    ANNUAL REVIEW OF HM ORDERS  01/26/2024    A1C  02/13/2024    COVID-19 Vaccine (7 - 2023-24 season) 03/13/2024    BMP  11/13/2024    LIPID  11/13/2024    TSH W/FREE T4 REFLEX  11/13/2024    HEMOGLOBIN  11/13/2024    FALL RISK ASSESSMENT  06/20/2025    ADVANCE CARE PLANNING  11/13/2028    DTAP/TDAP/TD IMMUNIZATION (3 - Td or Tdap) 01/20/2032    HEPATITIS C SCREENING  Completed    PHQ-2 (once per calendar year)  Completed    INFLUENZA VACCINE  Completed    Pneumococcal Vaccine: 65+ Years  Completed    URINALYSIS  Completed    IPV IMMUNIZATION  Aged Out    HPV IMMUNIZATION  Aged Out    MENINGITIS IMMUNIZATION  Aged Out    RSV MONOCLONAL ANTIBODY  " Aged Out    COLORECTAL CANCER SCREENING  Discontinued         Review of Systems  Constitutional, HEENT, cardiovascular, pulmonary, GI, , musculoskeletal, neuro, skin, endocrine and psych systems are negative, except as otherwise noted.     Objective    Exam  BP (!) 182/90 (BP Location: Right arm, Patient Position: Sitting, Cuff Size: Adult Large)   Pulse 52   Temp 97.7  F (36.5  C) (Tympanic)   Resp 16   Ht 1.822 m (5' 11.75\")   Wt 116.6 kg (257 lb)   SpO2 98%   BMI 35.10 kg/m     Estimated body mass index is 35.1 kg/m  as calculated from the following:    Height as of this encounter: 1.822 m (5' 11.75\").    Weight as of this encounter: 116.6 kg (257 lb).    Physical Exam  GENERAL APPEARANCE: morbidly obese alert and no distress  EYES: pink conj, no icterus, PERRL, EOMI  HENT: ear canals and TM's normal, nose and mouth without ulcers or lesions, oropharynx clear and oral mucous membranes moist  NECK: no adenopathy, no asymmetry, masses, or scars and thyroid normal to palpation  RESP: lungs clear to auscultation - no rales, rhonchi or wheezes  CV: regular rates and rhythm, normal S1 S2, no S3 or S4, no murmur, click or rub; peripheral pulses strong  ABDOMEN: soft, nontender, no hepatosplenomegaly, no masses and bowel sounds normal  RECTAL: deferred.  MS: no musculoskeletal defects are noted and gait is age appropriate without ataxia; bilateral lymphedema with no ulcers or stasis dermatitis  SKIN: no suspicious lesions or rashes  NEURO: Normal strength and tone, sensory exam grossly normal, mentation intact and speech normal   FOOT EXAM: no ulcer/erythema; pedal pulses strong bilaterally; monofilament: no deficit either foot          6/20/2024   Mini Cog   Clock Draw Score 2 Normal   3 Item Recall 3 objects recalled   Mini Cog Total Score 5                 Signed Electronically by: Gilberto Alonzo MD    "

## 2024-06-20 NOTE — PATIENT INSTRUCTIONS
Start hydrochlorothiazide 12.5 mg daily to help reduce blood pressure.  Continue all other medications without change.  1 week BP check with a nurse    Be consistent with low salt, low trans fat and low saturated fat diet.  Eat food rich in omega-3-fatty acids as you tolerate. (salmon, olive oil)  Eat 5 cups of vegetables, fruits and whole grains per day.  Limit starchy food (white rice, white bread, white pasta, white potatoes) to less than a cup per meal.  Minimize sweets, junk food and fastfood. Limit soda beverages to one serving per day; best to avoid it altogether though.    Exercise: moderate intensity sustained for at least 30 mins per episode, goal of 150 mins per week at least  Combine cardiovascular and resistance exercises.  These exercise recommendations are in addition to your daily activity at work or home.  Work on losing weight.      Regular foot care; don't walk barefoot  Annual eye exam.  Please request your eye doctor to furnish a copy of the eye exam report to the clinic to be placed in your record.  Flu vaccine by the end of October yearly.  Be sure to update any other recommended vaccinations for diabetics.    Blood tests: You will be contacted in 1-2 days for results of your lab tests.     You may get the Shingrix vaccine for shingles if you desire, and after you verify with insurance how they cover the vaccine.   Update covid19 vaccine soon.  RSV and flu shots in the fall.    Referrals to lymphedema and physical therapy clinics have been signed. Schedulers will call you in the next 3-5 business days.     Preventative Care Visits include: Yearly physicals, Well-child visits, Welcome to Medicare visits, & Medicare yearly wellness exams.    The purpose of these visits is to discuss your medical history and prevent health problems before you are sick.  You may need to pay a copay, coinsurance or deductible if your visit today includes testing or treating for a new or existing  "condition.    Additional charges may be incurred for today's visit. If you have questions about what your insurance plan covers, please contact your Insurance Company's member service department.  If you have questions specific to a bill you have already received from [facility], please contact the [facility] Billing Office at [phone].       Patient Education   Preventive Care Advice   This is general advice we often give to help people stay healthy. Your care team may have specific advice just for you. Please talk to your care team about your own preventive care needs.  Lifestyle  Exercise at least 150 minutes each week (30 minutes a day, 5 days a week).  Do muscle strengthening activities 2 days a week. These help control your weight and prevent disease.  No smoking.  Wear sunscreen to prevent skin cancer.  Have your home tested for radon every 2 to 5 years. Radon is a colorless, odorless gas that can harm your lungs. To learn more, go to www.health.[facility].mn. and search for \"Radon in Homes.\"  Keep guns unloaded and locked up in a safe place like a safe or gun vault, or, use a gun lock and hide the keys. Always lock away bullets separately. To learn more, visit Wiki-PR.mn.gov and search for \"safe gun storage.\"  Nutrition  Eat 5 or more servings of fruits and vegetables each day.  Try wheat bread, brown rice and whole grain pasta (instead of white bread, rice, and pasta).  Get enough calcium and vitamin D. Check the label on foods and aim for 100% of the RDA (recommended daily allowance).  Regular exams  Have a dental exam and cleaning every 6 months.  See your health care team every year to talk about:  Any changes in your health.  Any medicines your care team has prescribed.  Preventive care, family planning, and ways to prevent chronic diseases.  Shots (vaccines)   HPV shots (up to age 26), if you've never had them before.  Hepatitis B shots (up to age 59), if you've never had them before.  COVID-19 shot: Get " this shot when it's due.  Flu shot: Get a flu shot every year.  Tetanus shot: Get a tetanus shot every 10 years.  Pneumococcal, hepatitis A, and RSV shots: Ask your care team if you need these based on your risk.  Shingles shot (for age 50 and up).  General health tests  Diabetes screening:  Starting at age 35, Get screened for diabetes at least every 3 years.  If you are younger than age 35, ask your care team if you should be screened for diabetes.  Cholesterol test: At age 39, start having a cholesterol test every 5 years, or more often if advised.  Bone density scan (DEXA): At age 50, ask your care team if you should have this scan for osteoporosis (brittle bones).  Hepatitis C: Get tested at least once in your life.  Abdominal aortic aneurysm screening: Talk to your doctor about having this screening if you:  Have ever smoked; and  Are biologically male; and  Are between the ages of 65 and 75.  STIs (sexually transmitted infections)  Before age 24: Ask your care team if you should be screened for STIs.  After age 24: Get screened for STIs if you're at risk. You are at risk for STIs (including HIV) if:  You are sexually active with more than one person.  You don't use condoms every time.  You or a partner was diagnosed with a sexually transmitted infection.  If you are at risk for HIV, ask about PrEP medicine to prevent HIV.  Get tested for HIV at least once in your life, whether you are at risk for HIV or not.  Cancer screening tests  Cervical cancer screening: If you have a cervix, begin getting regular cervical cancer screening tests at age 21. Most people who have regular screenings with normal results can stop after age 65. Talk about this with your provider.  Breast cancer scan (mammogram): If you've ever had breasts, begin having regular mammograms starting at age 40. This is a scan to check for breast cancer.  Colon cancer screening: It is important to start screening for colon cancer at age 45.  Have a  colonoscopy test every 10 years (or more often if you're at risk) Or, ask your provider about stool tests like a FIT test every year or Cologuard test every 3 years.  To learn more about your testing options, visit: www.Umbel/835382.pdf.  For help making a decision, visit: gurmeet/ck86896.  Prostate cancer screening test: If you have a prostate and are age 55 to 69, ask your provider if you would benefit from a yearly prostate cancer screening test.  Lung cancer screening: If you are a current or former smoker age 50 to 80, ask your care team if ongoing lung cancer screenings are right for you.  For informational purposes only. Not to replace the advice of your health care provider. Copyright   2023 [employer]. All rights reserved. Clinically reviewed by the [facility]. [employer] 795903 - REV 04/24.  Preventing Falls: Care Instructions  Injuries and health problems such as trouble walking or poor eyesight can increase your risk of falling. So can some medicines. But there are things you can do to help prevent falls. You can exercise to get stronger. You can also arrange your home to make it safer.    Talk to your doctor about the medicines you take. Ask if any of them increase the risk of falls and whether they can be changed or stopped.   Try to exercise regularly. It can help improve your strength and balance. This can help lower your risk of falling.     Practice fall safety and prevention.    Wear low-heeled shoes that fit well and give your feet good support. Talk to your doctor if you have foot problems that make this hard.  Carry a cellphone or wear a medical alert device that you can use to call for help.  Use stepladders instead of chairs to reach high objects. Don't climb if you're at risk for falls. Ask for help, if needed.  Wear the correct eyeglasses, if you need them.    Make your home safer.    Remove rugs, cords, clutter, and furniture from  "walkways.  Keep your house well lit. Use night-lights in hallways and bathrooms.  Install and use sturdy handrails on stairways.  Wear nonskid footwear, even inside. Don't walk barefoot or in socks without shoes.    Be safe outside.    Use handrails, curb cuts, and ramps whenever possible.  Keep your hands free by using a shoulder bag or backpack.  Try to walk in well-lit areas. Watch out for uneven ground, changes in pavement, and debris.  Be careful in the winter. Walk on the grass or gravel when sidewalks are slippery. Use de-icer on steps and walkways. Add non-slip devices to shoes.    Put grab bars and nonskid mats in your shower or tub and near the toilet. Try to use a shower chair or bath bench when bathing.   Get into a tub or shower by putting in your weaker leg first. Get out with your strong side first. Have a phone or medical alert device in the bathroom with you.   Where can you learn more?  Go to https://www.Takeaway.com.net/patiented  Enter G117 in the search box to learn more about \"Preventing Falls: Care Instructions.\"  Current as of: July 17, 2023               Content Version: 14.0    1509-4057 [employer].   Care instructions adapted under license by your healthcare professional. If you have questions about a medical condition or this instruction, always ask your healthcare professional. [employer] disclaims any warranty or liability for your use of this information.      Hearing Loss: Care Instructions  Overview     Hearing loss is a sudden or slow decrease in how well you hear. It can range from slight to profound. Permanent hearing loss can occur with aging. It also can happen when you are exposed long-term to loud noise. Examples include listening to loud music, riding motorcycles, or being around other loud machines.  Hearing loss can affect your work and home life. It can make you feel lonely or depressed. You may feel that you have lost your independence. But " hearing aids and other devices can help you hear better and feel connected to others.  Follow-up care is a key part of your treatment and safety. Be sure to make and go to all appointments, and call your doctor if you are having problems. It's also a good idea to know your test results and keep a list of the medicines you take.  How can you care for yourself at home?  Avoid loud noises whenever possible. This helps keep your hearing from getting worse.  Always wear hearing protection around loud noises.  Wear a hearing aid as directed.  A professional can help you pick a hearing aid that will work best for you.  You can also get hearing aids over the counter for mild to moderate hearing loss.  Have hearing tests as your doctor suggests. They can show whether your hearing has changed. Your hearing aid may need to be adjusted.  Use other devices as needed. These may include:  Telephone amplifiers and hearing aids that can connect to a television, stereo, radio, or microphone.  Devices that use lights or vibrations. These alert you to the doorbell, a ringing telephone, or a baby monitor.  Television closed-captioning. This shows the words at the bottom of the screen. Most new TVs can do this.  TTY (text telephone). This lets you type messages back and forth on the telephone instead of talking or listening. These devices are also called TDD. When messages are typed on the keyboard, they are sent over the phone line to a receiving TTY. The message is shown on a monitor.  Use text messaging, social media, and email if it is hard for you to communicate by telephone.  Try to learn a listening technique called speechreading. It is not lipreading. You pay attention to people's gestures, expressions, posture, and tone of voice. These clues can help you understand what a person is saying. Face the person you are talking to, and have them face you. Make sure the lighting is good. You need to see the other person's face  "clearly.  Think about counseling if you need help to adjust to your hearing loss.  When should you call for help?  Watch closely for changes in your health, and be sure to contact your doctor if:    You think your hearing is getting worse.     You have new symptoms, such as dizziness or nausea.   Where can you learn more?  Go to https://www.SoundOut.net/patiented  Enter R798 in the search box to learn more about \"Hearing Loss: Care Instructions.\"  Current as of: September 27, 2023               Content Version: 14.0    8750-0610 [employer].   Care instructions adapted under license by your healthcare professional. If you have questions about a medical condition or this instruction, always ask your healthcare professional. [employer] disclaims any warranty or liability for your use of this information.      Substance Use Disorder: Care Instructions  Overview     You can improve your life and health by stopping your use of alcohol or drugs. When you don't drink or use drugs, you may feel and sleep better. You may get along better with your family, friends, and coworkers. There are medicines and programs that can help with substance use disorder.  How can you care for yourself at home?  Here are some ways to help you stay sober and prevent relapse.  If you have been given medicine to help keep you sober or reduce your cravings, be sure to take it exactly as prescribed.  Talk to your doctor about programs that can help you stop using drugs or drinking alcohol.  Do not keep alcohol or drugs in your home.  Plan ahead. Think about what you'll say if other people ask you to drink or use drugs. Try not to spend time with people who drink or use drugs.  Use the time and money spent on drinking or drugs to do something that's important to you.  Preventing a relapse  Have a plan to deal with relapse. Learn to recognize changes in your thinking that lead you to drink or use drugs. Get help before " you start to drink or use drugs again.  Try to stay away from situations, friends, or places that may lead you to drink or use drugs.  If you feel the need to drink alcohol or use drugs again, seek help right away. Call a trusted friend or family member. Some people get support from organizations such as Narcotics Anonymous or [facility] or from treatment facilities.  If you relapse, get help as soon as you can. Some people make a plan with another person that outlines what they want that person to do for them if they relapse. The plan usually includes how to handle the relapse and who to notify in case of relapse.  Don't give up. Remember that a relapse doesn't mean that you have failed. Use the experience to learn the triggers that lead you to drink or use drugs. Then quit again. Recovery is a lifelong process. Many people have several relapses before they are able to quit for good.  Follow-up care is a key part of your treatment and safety. Be sure to make and go to all appointments, and call your doctor if you are having problems. It's also a good idea to know your test results and keep a list of the medicines you take.  When should you call for help?   Call 911  anytime you think you may need emergency care. For example, call if you or someone else:    Has overdosed or has withdrawal signs. Be sure to tell the emergency workers that you are or someone else is using or trying to quit using drugs. Overdose or withdrawal signs may include:  Losing consciousness.  Seizure.  Seeing or hearing things that aren't there (hallucinations).     Is thinking or talking about suicide or harming others.   Where to get help 24 hours a day, 7 days a week   If you or someone you know talks about suicide, self-harm, a mental health crisis, a substance use crisis, or any other kind of emotional distress, get help right away. You can:    Call the Suicide and Crisis Lifeline at 377.     Call 0-449-045-TALK (1-571.901.2479).      "Text HOME to 079904 to access the Crisis Text Line.   Consider saving these numbers in your phone.  Go to [employer] for more information or to chat online.  Call your doctor now or seek immediate medical care if:    You are having withdrawal symptoms. These may include nausea or vomiting, sweating, shakiness, and anxiety.   Watch closely for changes in your health, and be sure to contact your doctor if:    You have a relapse.     You need more help or support to stop.   Where can you learn more?  Go to https://www.Foursquare.net/patiented  Enter H573 in the search box to learn more about \"Substance Use Disorder: Care Instructions.\"  Current as of: November 15, 2023               Content Version: 14.0    1837-5954 [employer].   Care instructions adapted under license by your healthcare professional. If you have questions about a medical condition or this instruction, always ask your healthcare professional. Healthwise, [employer] disclaims any warranty or liability for your use of this information.         "

## 2024-06-21 NOTE — RESULT ENCOUNTER NOTE
Please ask lab if patient has sample for BMP to be done. It was released from orders from yesterday.

## 2024-06-28 ENCOUNTER — TELEPHONE (OUTPATIENT)
Dept: FAMILY MEDICINE | Facility: CLINIC | Age: 79
End: 2024-06-28

## 2024-06-28 ENCOUNTER — APPOINTMENT (OUTPATIENT)
Dept: LAB | Facility: CLINIC | Age: 79
End: 2024-06-28
Payer: COMMERCIAL

## 2024-06-28 ENCOUNTER — ALLIED HEALTH/NURSE VISIT (OUTPATIENT)
Dept: FAMILY MEDICINE | Facility: CLINIC | Age: 79
End: 2024-06-28
Payer: COMMERCIAL

## 2024-06-28 VITALS — DIASTOLIC BLOOD PRESSURE: 67 MMHG | SYSTOLIC BLOOD PRESSURE: 133 MMHG | HEART RATE: 50 BPM

## 2024-06-28 DIAGNOSIS — Z01.30 BP CHECK: Primary | ICD-10-CM

## 2024-06-28 LAB
ANION GAP SERPL CALCULATED.3IONS-SCNC: 10 MMOL/L (ref 7–15)
BUN SERPL-MCNC: 28.6 MG/DL (ref 8–23)
CALCIUM SERPL-MCNC: 9.8 MG/DL (ref 8.8–10.2)
CHLORIDE SERPL-SCNC: 101 MMOL/L (ref 98–107)
CREAT SERPL-MCNC: 1.54 MG/DL (ref 0.67–1.17)
DEPRECATED HCO3 PLAS-SCNC: 28 MMOL/L (ref 22–29)
EGFRCR SERPLBLD CKD-EPI 2021: 46 ML/MIN/1.73M2
GLUCOSE SERPL-MCNC: 131 MG/DL (ref 70–99)
POTASSIUM SERPL-SCNC: 4.8 MMOL/L (ref 3.4–5.3)
SODIUM SERPL-SCNC: 139 MMOL/L (ref 135–145)

## 2024-06-28 PROCEDURE — 36415 COLL VENOUS BLD VENIPUNCTURE: CPT | Performed by: FAMILY MEDICINE

## 2024-06-28 PROCEDURE — 80048 BASIC METABOLIC PNL TOTAL CA: CPT | Performed by: FAMILY MEDICINE

## 2024-06-28 PROCEDURE — 99207 PR NO CHARGE NURSE ONLY: CPT

## 2024-06-28 NOTE — PROGRESS NOTES
Anil Gutierres  is a 78 year old patient who comes in today for a Blood Pressure check because of ongoing blood pressure monitoring.  Vital Signs as repeated by /83, p-49, 133/67, p-50 after sitting for 10 minutes.  Patient is taking medication as prescribed  Patient is tolerating medications well.  Patient is monitoring Blood Pressure at home.  Average readings if yes are 168/83, p-60  Current complaints: none  Disposition:  Pt will avoid high sodium foods, increase aerobic type exercise and check and record BP daily at home after sitting for 10 minutes.

## 2024-07-05 ASSESSMENT — ACTIVITIES OF DAILY LIVING (ADL)
GO UP STAIRS: ACTIVITY IS SOMEWHAT DIFFICULT
GIVING WAY, BUCKLING OR SHIFTING OF KNEE: I HAVE THE SYMPTOM BUT IT DOES NOT AFFECT MY ACTIVITY
HOW_WOULD_YOU_RATE_THE_OVERALL_FUNCTION_OF_YOUR_KNEE_DURING_YOUR_USUAL_DAILY_ACTIVITIES?: ABNORMAL
SWELLING: THE SYMPTOM AFFECTS MY ACTIVITY SLIGHTLY
HOW_WOULD_YOU_RATE_THE_CURRENT_FUNCTION_OF_YOUR_KNEE_DURING_YOUR_USUAL_DAILY_ACTIVITIES_ON_A_SCALE_FROM_0_TO_100_WITH_100_BEING_YOUR_LEVEL_OF_KNEE_FUNCTION_PRIOR_TO_YOUR_INJURY_AND_0_BEING_THE_INABILITY_TO_PERFORM_ANY_OF_YOUR_USUAL_DAILY_ACTIVITIES?: 50
WALK: ACTIVITY IS MINIMALLY DIFFICULT
SWELLING: THE SYMPTOM AFFECTS MY ACTIVITY SLIGHTLY
GO UP STAIRS: ACTIVITY IS SOMEWHAT DIFFICULT
HOW_WOULD_YOU_RATE_THE_OVERALL_FUNCTION_OF_YOUR_KNEE_DURING_YOUR_USUAL_DAILY_ACTIVITIES?: ABNORMAL
SQUAT: I AM UNABLE TO DO THE ACTIVITY
GO DOWN STAIRS: ACTIVITY IS SOMEWHAT DIFFICULT
WALK: ACTIVITY IS MINIMALLY DIFFICULT
KNEE_ACTIVITY_OF_DAILY_LIVING_SCORE: 64.29
AS_A_RESULT_OF_YOUR_KNEE_INJURY,_HOW_WOULD_YOU_RATE_YOUR_CURRENT_LEVEL_OF_DAILY_ACTIVITY?: ABNORMAL
SIT WITH YOUR KNEE BENT: ACTIVITY IS NOT DIFFICULT
LIMPING: I DO NOT HAVE THE SYMPTOM
KNEEL ON THE FRONT OF YOUR KNEE: ACTIVITY IS SOMEWHAT DIFFICULT
STAND: ACTIVITY IS MINIMALLY DIFFICULT
STIFFNESS: THE SYMPTOM AFFECTS MY ACTIVITY SLIGHTLY
GO DOWN STAIRS: ACTIVITY IS SOMEWHAT DIFFICULT
PAIN: THE SYMPTOM AFFECTS MY ACTIVITY SLIGHTLY
KNEE_ACTIVITY_OF_DAILY_LIVING_SUM: 45
WEAKNESS: THE SYMPTOM AFFECTS MY ACTIVITY SLIGHTLY
SQUAT: I AM UNABLE TO DO THE ACTIVITY
GIVING WAY, BUCKLING OR SHIFTING OF KNEE: I HAVE THE SYMPTOM BUT IT DOES NOT AFFECT MY ACTIVITY
RISE FROM A CHAIR: ACTIVITY IS FAIRLY DIFFICULT
AS_A_RESULT_OF_YOUR_KNEE_INJURY,_HOW_WOULD_YOU_RATE_YOUR_CURRENT_LEVEL_OF_DAILY_ACTIVITY?: ABNORMAL
WEAKNESS: THE SYMPTOM AFFECTS MY ACTIVITY SLIGHTLY
LIMPING: I DO NOT HAVE THE SYMPTOM
RISE FROM A CHAIR: ACTIVITY IS FAIRLY DIFFICULT
PLEASE_INDICATE_YOR_PRIMARY_REASON_FOR_REFERRAL_TO_THERAPY:: KNEE
STAND: ACTIVITY IS MINIMALLY DIFFICULT
RAW_SCORE: 45
SIT WITH YOUR KNEE BENT: ACTIVITY IS NOT DIFFICULT
STIFFNESS: THE SYMPTOM AFFECTS MY ACTIVITY SLIGHTLY
PAIN: THE SYMPTOM AFFECTS MY ACTIVITY SLIGHTLY
HOW_WOULD_YOU_RATE_THE_CURRENT_FUNCTION_OF_YOUR_KNEE_DURING_YOUR_USUAL_DAILY_ACTIVITIES_ON_A_SCALE_FROM_0_TO_100_WITH_100_BEING_YOUR_LEVEL_OF_KNEE_FUNCTION_PRIOR_TO_YOUR_INJURY_AND_0_BEING_THE_INABILITY_TO_PERFORM_ANY_OF_YOUR_USUAL_DAILY_ACTIVITIES?: 50
KNEEL ON THE FRONT OF YOUR KNEE: ACTIVITY IS SOMEWHAT DIFFICULT

## 2024-07-09 ENCOUNTER — THERAPY VISIT (OUTPATIENT)
Dept: PHYSICAL THERAPY | Facility: CLINIC | Age: 79
End: 2024-07-09
Attending: FAMILY MEDICINE
Payer: COMMERCIAL

## 2024-07-09 DIAGNOSIS — G89.29 CHRONIC KNEE PAIN AFTER TOTAL REPLACEMENT OF LEFT KNEE JOINT: ICD-10-CM

## 2024-07-09 DIAGNOSIS — Z96.652 CHRONIC KNEE PAIN AFTER TOTAL REPLACEMENT OF LEFT KNEE JOINT: ICD-10-CM

## 2024-07-09 DIAGNOSIS — M25.562 CHRONIC KNEE PAIN AFTER TOTAL REPLACEMENT OF LEFT KNEE JOINT: ICD-10-CM

## 2024-07-09 PROCEDURE — 97161 PT EVAL LOW COMPLEX 20 MIN: CPT | Mod: GP | Performed by: PHYSICAL THERAPIST

## 2024-07-09 PROCEDURE — 97110 THERAPEUTIC EXERCISES: CPT | Mod: GP | Performed by: PHYSICAL THERAPIST

## 2024-07-09 NOTE — PROGRESS NOTES
PHYSICAL THERAPY EVALUATION  Type of Visit: Evaluation              Subjective pt notes L knee pain and started exercising greater this past winter and notes greater pain in the L knee. Pt notes greater pain with climbing stairs. Pt notes anterior knee pain. No numbness/tingling. Pt notes occasional radiating further down the shin.      Presenting condition or subjective complaint: left knee pain and weakness, two years after knee replacement  Date of onset: 06/20/24 (order date)    Relevant medical history: Arthritis; Diabetes; Hearing problems; Heart problems; High blood pressure; Kidney disease; Overweight; Thyroid problems; Vision problems   Dates & types of surgery: right hip 2020; left Knee 2022; left hip 2023; tongue 2022    Prior diagnostic imaging/testing results:       Prior therapy history for the same diagnosis, illness or injury: No      Prior Level of Function  Independent with all mobility    Living Environment  Social support: With a significant other or spouse   Type of home: House   Stairs to enter the home: Yes 2 Is there a railing: Yes     Ramp: No   Stairs inside the home: Yes 7 Is there a railing: Yes     Help at home: None  Equipment owned: Straight Cane; Walker with wheels; Standard wheelchair; Dressing equipment     Employment: No    Hobbies/Interests:      Patient goals for therapy: Stand from sitting without some kind of knee pain    Pain assessment: Pain present     Objective   KNEE EVALUATION  PAIN: Pain is Exacerbated By: stairs, ambulation, standing, movement  POSTURE:  wide DIAMANTE, ER, RLE swelling greater than L  GAIT: wide DIAMANTE, small steps, slightly shuffling gait, even WBing  ROM:  0-115*, tension in the knee with the full flexion  STRENGTH:  hip flex- 3+/5 with hip flexor L, 4-/5 R;  hip abd- unable to lie on sides and assessed in standing, cramping at the TFL on the L side, 3-/5;  ER in supine- 4+/5  FLEXIBILITY:  tension in the L quad  FUNCTIONAL TESTS:  STS- use of UEs  PALPATION:   no tenderness noted to the area by the pt  JOINT MOBILITY:  patellar glides- no pain greater mobility with lateral and medial than superior to inferior    Assessment & Plan   CLINICAL IMPRESSIONS  Medical Diagnosis: 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  The following significant findings have been identified: Pain, Decreased ROM/flexibility, Decreased strength, Impaired balance, Impaired gait, Impaired muscle performance, and Decreased activity tolerance. These impairments interfere with their ability to perform self care tasks, recreational activities, household chores, and community mobility as compared to previous level of function.     Clinical Decision Making (Complexity):  Clinical Presentation: Stable/Uncomplicated  Clinical Presentation Rationale: based on medical and personal factors listed in PT evaluation  Clinical Decision Making (Complexity): Low complexity    PLAN OF CARE  Treatment Interventions:  Interventions: Gait Training, Manual Therapy, Neuromuscular Re-education, Therapeutic Activity, Therapeutic Exercise, Self-Care/Home Management    Long Term Goals     PT Goal 1  Goal Identifier: STG  Goal Description: Pt will have 120* knee flexion to reduce tension/stiffness in the L knee with mobility in 4 weeks.  Target Date: 08/06/24  PT Goal 2  Goal Identifier: LTG  Goal Description: pt will have no pain when climbing the stairs in 10 weeks to decrease sx to the area.  Target Date: 09/17/24  PT Goal 3  Goal Identifier: LTG  Goal Description: Pt will be indepndent in home strengthening program for self management of Sx in 10 weeks.  Target Date: 09/17/24      Frequency of Treatment: 1x/wk  Duration of Treatment: 10 weeks    Education Assessment:   Learner/Method: Patient;Listening;Demonstration;No Barriers to Learning    Risks and  benefits of evaluation/treatment have been explained.   Patient/Family/caregiver agrees with Plan of Care.     Evaluation Time:     PT Eval, Low Complexity Minutes (69401): 20     Signing Clinician: MELANI Jimenes [facility]                                                                                   OUTPATIENT PHYSICAL THERAPY      PLAN OF TREATMENT FOR OUTPATIENT REHABILITATION   Patient's Last Name, First Name, Anil Davis YOB: 1945   Provider's Name   [facility]   Medical Record No.  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              Referring Provider:  Gilberto Alonzo MD    Initial Assessment  See Epic Evaluation- Start of Care Date: 07/09/24

## 2024-07-15 DIAGNOSIS — N18.32 CKD STAGE 3B, GFR 30-44 ML/MIN (H): ICD-10-CM

## 2024-07-15 DIAGNOSIS — I10 UNCONTROLLED HYPERTENSION: Primary | ICD-10-CM

## 2024-07-15 NOTE — PROGRESS NOTES
"Modesto Laird has a lab appointment with us this week. His appointment note says \"BMP\". He doesn't have an order for one and it looks like one was just done the end of June. If another one is needed an order needs to be placed or patient notified he doesn't need to come in.  Thank you,  [facility]  "

## 2024-07-18 ENCOUNTER — THERAPY VISIT (OUTPATIENT)
Dept: PHYSICAL THERAPY | Facility: CLINIC | Age: 79
End: 2024-07-18
Attending: FAMILY MEDICINE
Payer: COMMERCIAL

## 2024-07-18 DIAGNOSIS — Z96.652 CHRONIC KNEE PAIN AFTER TOTAL REPLACEMENT OF LEFT KNEE JOINT: Primary | ICD-10-CM

## 2024-07-18 DIAGNOSIS — G89.29 CHRONIC KNEE PAIN AFTER TOTAL REPLACEMENT OF LEFT KNEE JOINT: Primary | ICD-10-CM

## 2024-07-18 DIAGNOSIS — M25.562 CHRONIC KNEE PAIN AFTER TOTAL REPLACEMENT OF LEFT KNEE JOINT: Primary | ICD-10-CM

## 2024-07-18 PROCEDURE — 97110 THERAPEUTIC EXERCISES: CPT | Mod: GP | Performed by: PHYSICAL THERAPIST

## 2024-07-19 ENCOUNTER — LAB (OUTPATIENT)
Dept: LAB | Facility: CLINIC | Age: 79
End: 2024-07-19
Payer: COMMERCIAL

## 2024-07-19 DIAGNOSIS — I10 UNCONTROLLED HYPERTENSION: ICD-10-CM

## 2024-07-19 LAB
ANION GAP SERPL CALCULATED.3IONS-SCNC: 11 MMOL/L (ref 7–15)
BUN SERPL-MCNC: 47.7 MG/DL (ref 8–23)
CALCIUM SERPL-MCNC: 9.9 MG/DL (ref 8.8–10.4)
CHLORIDE SERPL-SCNC: 101 MMOL/L (ref 98–107)
CREAT SERPL-MCNC: 1.66 MG/DL (ref 0.67–1.17)
EGFRCR SERPLBLD CKD-EPI 2021: 42 ML/MIN/1.73M2
GLUCOSE SERPL-MCNC: 133 MG/DL (ref 70–99)
HCO3 SERPL-SCNC: 25 MMOL/L (ref 22–29)
POTASSIUM SERPL-SCNC: 5.1 MMOL/L (ref 3.4–5.3)
SODIUM SERPL-SCNC: 137 MMOL/L (ref 135–145)

## 2024-07-19 PROCEDURE — 80048 BASIC METABOLIC PNL TOTAL CA: CPT

## 2024-07-19 PROCEDURE — 36415 COLL VENOUS BLD VENIPUNCTURE: CPT

## 2024-07-31 ENCOUNTER — THERAPY VISIT (OUTPATIENT)
Dept: PHYSICAL THERAPY | Facility: CLINIC | Age: 79
End: 2024-07-31
Attending: FAMILY MEDICINE
Payer: COMMERCIAL

## 2024-07-31 DIAGNOSIS — Z96.652 CHRONIC KNEE PAIN AFTER TOTAL REPLACEMENT OF LEFT KNEE JOINT: Primary | ICD-10-CM

## 2024-07-31 DIAGNOSIS — M25.562 CHRONIC KNEE PAIN AFTER TOTAL REPLACEMENT OF LEFT KNEE JOINT: Primary | ICD-10-CM

## 2024-07-31 DIAGNOSIS — G89.29 CHRONIC KNEE PAIN AFTER TOTAL REPLACEMENT OF LEFT KNEE JOINT: Primary | ICD-10-CM

## 2024-07-31 PROCEDURE — 97140 MANUAL THERAPY 1/> REGIONS: CPT | Mod: GP | Performed by: PHYSICAL THERAPIST

## 2024-07-31 PROCEDURE — 97110 THERAPEUTIC EXERCISES: CPT | Mod: GP | Performed by: PHYSICAL THERAPIST

## 2024-08-06 ENCOUNTER — LAB (OUTPATIENT)
Dept: LAB | Facility: CLINIC | Age: 79
End: 2024-08-06
Payer: COMMERCIAL

## 2024-08-06 ENCOUNTER — THERAPY VISIT (OUTPATIENT)
Dept: PHYSICAL THERAPY | Facility: CLINIC | Age: 79
End: 2024-08-06
Attending: FAMILY MEDICINE
Payer: COMMERCIAL

## 2024-08-06 DIAGNOSIS — G89.29 CHRONIC KNEE PAIN AFTER TOTAL REPLACEMENT OF LEFT KNEE JOINT: Primary | ICD-10-CM

## 2024-08-06 DIAGNOSIS — Z96.652 CHRONIC KNEE PAIN AFTER TOTAL REPLACEMENT OF LEFT KNEE JOINT: Primary | ICD-10-CM

## 2024-08-06 DIAGNOSIS — M25.562 CHRONIC KNEE PAIN AFTER TOTAL REPLACEMENT OF LEFT KNEE JOINT: Primary | ICD-10-CM

## 2024-08-06 DIAGNOSIS — N18.32 CKD STAGE 3B, GFR 30-44 ML/MIN (H): ICD-10-CM

## 2024-08-06 LAB
ALBUMIN UR-MCNC: ABNORMAL MG/DL
APPEARANCE UR: CLEAR
BACTERIA #/AREA URNS HPF: ABNORMAL /HPF
BILIRUB UR QL STRIP: NEGATIVE
COLOR UR AUTO: YELLOW
CREAT SERPL-MCNC: 1.59 MG/DL (ref 0.67–1.17)
CREAT UR-MCNC: 107.6 MG/DL
EGFRCR SERPLBLD CKD-EPI 2021: 44 ML/MIN/1.73M2
GLUCOSE UR STRIP-MCNC: NEGATIVE MG/DL
HGB BLD-MCNC: 14.3 G/DL (ref 13.3–17.7)
HGB UR QL STRIP: NEGATIVE
KETONES UR STRIP-MCNC: NEGATIVE MG/DL
LEUKOCYTE ESTERASE UR QL STRIP: NEGATIVE
MICROALBUMIN UR-MCNC: 131.3 MG/L
MICROALBUMIN/CREAT UR: 122.03 MG/G CR (ref 0–17)
MUCOUS THREADS #/AREA URNS LPF: PRESENT /LPF
NITRATE UR QL: NEGATIVE
PH UR STRIP: 5.5 [PH] (ref 5–7)
RBC #/AREA URNS AUTO: ABNORMAL /HPF
SP GR UR STRIP: 1.02 (ref 1–1.03)
SQUAMOUS #/AREA URNS AUTO: ABNORMAL /LPF
UROBILINOGEN UR STRIP-ACNC: 0.2 E.U./DL
WBC #/AREA URNS AUTO: ABNORMAL /HPF

## 2024-08-06 PROCEDURE — 85018 HEMOGLOBIN: CPT

## 2024-08-06 PROCEDURE — 81001 URINALYSIS AUTO W/SCOPE: CPT

## 2024-08-06 PROCEDURE — 36415 COLL VENOUS BLD VENIPUNCTURE: CPT

## 2024-08-06 PROCEDURE — 97110 THERAPEUTIC EXERCISES: CPT | Mod: GP | Performed by: PHYSICAL THERAPIST

## 2024-08-06 PROCEDURE — 82570 ASSAY OF URINE CREATININE: CPT

## 2024-08-06 PROCEDURE — 82565 ASSAY OF CREATININE: CPT

## 2024-08-06 PROCEDURE — 82043 UR ALBUMIN QUANTITATIVE: CPT

## 2024-08-13 ENCOUNTER — THERAPY VISIT (OUTPATIENT)
Dept: PHYSICAL THERAPY | Facility: CLINIC | Age: 79
End: 2024-08-13
Attending: FAMILY MEDICINE
Payer: COMMERCIAL

## 2024-08-13 DIAGNOSIS — G89.29 CHRONIC KNEE PAIN AFTER TOTAL REPLACEMENT OF LEFT KNEE JOINT: Primary | ICD-10-CM

## 2024-08-13 DIAGNOSIS — Z96.652 CHRONIC KNEE PAIN AFTER TOTAL REPLACEMENT OF LEFT KNEE JOINT: Primary | ICD-10-CM

## 2024-08-13 DIAGNOSIS — M25.562 CHRONIC KNEE PAIN AFTER TOTAL REPLACEMENT OF LEFT KNEE JOINT: Primary | ICD-10-CM

## 2024-08-13 PROCEDURE — 97110 THERAPEUTIC EXERCISES: CPT | Mod: GP | Performed by: PHYSICAL THERAPIST

## 2024-08-20 ENCOUNTER — THERAPY VISIT (OUTPATIENT)
Dept: PHYSICAL THERAPY | Facility: CLINIC | Age: 79
End: 2024-08-20
Attending: FAMILY MEDICINE
Payer: COMMERCIAL

## 2024-08-20 DIAGNOSIS — Z96.652 CHRONIC KNEE PAIN AFTER TOTAL REPLACEMENT OF LEFT KNEE JOINT: Primary | ICD-10-CM

## 2024-08-20 DIAGNOSIS — G89.29 CHRONIC KNEE PAIN AFTER TOTAL REPLACEMENT OF LEFT KNEE JOINT: Primary | ICD-10-CM

## 2024-08-20 DIAGNOSIS — M25.562 CHRONIC KNEE PAIN AFTER TOTAL REPLACEMENT OF LEFT KNEE JOINT: Primary | ICD-10-CM

## 2024-08-20 PROCEDURE — 97110 THERAPEUTIC EXERCISES: CPT | Mod: GP | Performed by: PHYSICAL THERAPIST

## 2024-08-21 DIAGNOSIS — E03.9 HYPOTHYROIDISM, UNSPECIFIED TYPE: ICD-10-CM

## 2024-08-21 NOTE — TELEPHONE ENCOUNTER
Pending Prescriptions:                       Disp   Refills    levothyroxine (SYNTHROID/LEVOTHROID) 137 *90 tab*2            Sig: TAKE 1 TABLET BY MOUTH DAILY.  WELLNESS EXAM

## 2024-08-22 RX ORDER — LEVOTHYROXINE SODIUM 137 UG/1
TABLET ORAL
Qty: 90 TABLET | Refills: 1 | Status: SHIPPED | OUTPATIENT
Start: 2024-08-22

## 2024-08-27 ENCOUNTER — THERAPY VISIT (OUTPATIENT)
Dept: PHYSICAL THERAPY | Facility: CLINIC | Age: 79
End: 2024-08-27
Attending: FAMILY MEDICINE
Payer: COMMERCIAL

## 2024-08-27 DIAGNOSIS — Z96.652 CHRONIC KNEE PAIN AFTER TOTAL REPLACEMENT OF LEFT KNEE JOINT: Primary | ICD-10-CM

## 2024-08-27 DIAGNOSIS — G89.29 CHRONIC KNEE PAIN AFTER TOTAL REPLACEMENT OF LEFT KNEE JOINT: Primary | ICD-10-CM

## 2024-08-27 DIAGNOSIS — M25.562 CHRONIC KNEE PAIN AFTER TOTAL REPLACEMENT OF LEFT KNEE JOINT: Primary | ICD-10-CM

## 2024-08-27 PROCEDURE — 97110 THERAPEUTIC EXERCISES: CPT | Mod: GP | Performed by: PHYSICAL THERAPIST

## 2024-09-03 ENCOUNTER — THERAPY VISIT (OUTPATIENT)
Dept: PHYSICAL THERAPY | Facility: CLINIC | Age: 79
End: 2024-09-03
Attending: FAMILY MEDICINE
Payer: COMMERCIAL

## 2024-09-03 DIAGNOSIS — G89.29 CHRONIC KNEE PAIN AFTER TOTAL REPLACEMENT OF LEFT KNEE JOINT: Primary | ICD-10-CM

## 2024-09-03 DIAGNOSIS — Z96.652 CHRONIC KNEE PAIN AFTER TOTAL REPLACEMENT OF LEFT KNEE JOINT: Primary | ICD-10-CM

## 2024-09-03 DIAGNOSIS — M25.562 CHRONIC KNEE PAIN AFTER TOTAL REPLACEMENT OF LEFT KNEE JOINT: Primary | ICD-10-CM

## 2024-09-03 PROCEDURE — 97110 THERAPEUTIC EXERCISES: CPT | Mod: GP | Performed by: PHYSICAL THERAPIST

## 2024-09-03 NOTE — PROGRESS NOTES
DISCHARGE  Reason for Discharge: Patient has met all goals.    Equipment Issued: none    Discharge Plan: Patient to continue home program.    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   Target Date 08/06/24   Date Met 09/03/24   PT Goal 2   Goal Identifier LTG   Goal Description pt will have no pain when climbing the stairs in 10 weeks to decrease sx to the area.   Target Date 09/17/24   Date Met 08/20/24   PT Goal 3   Goal Identifier LTG   Goal Description Pt will be indepndent in home strengthening program for self management of Sx in 10 weeks.   Target Date 09/17/24   Date Met 09/03/24   Subjective Report   Subjective Report pt is still having pain in the L knee but feeling stronger at this time.   Objective Measures   Objective Measures Objective Measure 1   Objective Measure 1   Objective Measure ROM   Details 120*   Treatment Interventions (PT)   Interventions Therapeutic Procedure/Exercise;Manual Therapy   Therapeutic Procedure/Exercise   Therapeutic Procedures: strength, endurance, ROM, flexibility minutes (91175) 30   Ther Proc 1 - Details mini squats-10x;  big steps fwd for lunge position- 10x;  modified lunges- 10x;   "kneeling onto ground and rising- 10x;  standing hip abd with GTB around ankles- 2 x 10, b/l;  standing hip ext with GTB around ankles- 10x, b/l;  lateral step-ups on 6\" step- 15x, b/l;  fwd step-downs on 2, 4, and 6\" step- 10x ecah LE and each step height   Skilled Intervention used to increase mobility, strengthening   Patient Response/Progress tolerable with activities, he notes on and off pain in the knee, able to continue strengthening independently   Education   Learner/Method Patient;Listening;Demonstration;No Barriers to Learning   Plan   Home program papers- SLR, standing hip abd, standing hs curl, heel slides, HS stretch, hip flexor stretch   Plan for next session discharge   Total Session Time   Timed Code Treatment Minutes 30   Total Treatment Time (sum of timed and untimed services) 30       "

## 2024-09-07 ENCOUNTER — MYC REFILL (OUTPATIENT)
Dept: FAMILY MEDICINE | Facility: CLINIC | Age: 79
End: 2024-09-07
Payer: COMMERCIAL

## 2024-09-07 DIAGNOSIS — I10 UNCONTROLLED HYPERTENSION: ICD-10-CM

## 2024-09-07 DIAGNOSIS — I89.0 LYMPHEDEMA: ICD-10-CM

## 2024-09-09 RX ORDER — HYDROCHLOROTHIAZIDE 12.5 MG/1
12.5 TABLET ORAL DAILY
Qty: 90 TABLET | Refills: 3 | Status: SHIPPED | OUTPATIENT
Start: 2024-09-09

## 2024-09-10 ENCOUNTER — OFFICE VISIT (OUTPATIENT)
Dept: FAMILY MEDICINE | Facility: CLINIC | Age: 79
End: 2024-09-10
Payer: COMMERCIAL

## 2024-09-10 VITALS
SYSTOLIC BLOOD PRESSURE: 128 MMHG | HEIGHT: 71 IN | HEART RATE: 61 BPM | TEMPERATURE: 98.2 F | BODY MASS INDEX: 35.7 KG/M2 | RESPIRATION RATE: 16 BRPM | WEIGHT: 255 LBS | DIASTOLIC BLOOD PRESSURE: 70 MMHG | OXYGEN SATURATION: 95 %

## 2024-09-10 DIAGNOSIS — M65.342 TRIGGER FINGER, LEFT RING FINGER: Primary | ICD-10-CM

## 2024-09-10 PROCEDURE — G0008 ADMIN INFLUENZA VIRUS VAC: HCPCS | Performed by: FAMILY MEDICINE

## 2024-09-10 PROCEDURE — 99213 OFFICE O/P EST LOW 20 MIN: CPT | Mod: 25 | Performed by: FAMILY MEDICINE

## 2024-09-10 PROCEDURE — 90662 IIV NO PRSV INCREASED AG IM: CPT | Performed by: FAMILY MEDICINE

## 2024-09-10 ASSESSMENT — PAIN SCALES - GENERAL: PAINLEVEL: NO PAIN (0)

## 2024-09-10 NOTE — PATIENT INSTRUCTIONS
You will be contacted in 1-2 business days to get a schedule for the hand ortho specialist     May continue using the finger splint consistently until seen by ortho.    Review your health maintenance reminders in [facility] and complete what you are due for.

## 2024-09-10 NOTE — PROGRESS NOTES
Assessment & Plan     Trigger finger, left ring finger  Chronic trigger finger with pain at the MCP.  Due to refractory to splinting and is affecting his dominant hand, consult hand ortho for consideration for surgery  Return precautions discussed and given to patient.   - Orthopedic  Referral              Patient Instructions   You will be contacted in 1-2 business days to get a schedule for the hand ortho specialist     May continue using the finger splint consistently until seen by ortho.    Review your health maintenance reminders in [facility] and complete what you are due for.    Subjective   Modesto is a 78 year old, presenting for the following health issues:  Finger (X2 months, Left ring finger is getting stuck in a bent position, pt states it is painful when stuck in the position and is difficult to release. Pt states he also has pain in the accompanying knuckle at the base of the same finger. )        9/10/2024    11:03 AM   Additional Questions   Roomed by Tyra ESTRADA MA   Accompanied by self         9/10/2024    11:03 AM   Patient Reported Additional Medications   Patient reports taking the following new medications none     History of Present Illness       Reason for visit:  Trigger finger condition on left hand ring finger  Symptom onset:  More than a month  Symptoms include:  Left hand ring finger locks in place. painful to get straightened out.  Symptom intensity:  Moderate  Symptom progression:  Staying the same  Had these symptoms before:  No  What makes it worse:  Staying loccked in the curled position make it more painful to straighten out.  What makes it better:  Brace the finger in the straight position.   He is taking medications regularly.     Patient is left hand dominant.        Review of Systems  Constitutional, HEENT, cardiovascular, pulmonary, GI, , musculoskeletal, neuro, skin, endocrine and psych systems are negative, except as otherwise noted.      Objective    /70 (BP  "Location: Right arm, Patient Position: Sitting, Cuff Size: Adult Large)   Pulse 61   Temp 98.2  F (36.8  C) (Tympanic)   Resp 16   Ht 1.803 m (5' 11\")   Wt 115.7 kg (255 lb)   SpO2 95%   BMI 35.57 kg/m    Body mass index is 35.57 kg/m .  Physical Exam   GEN: alert, oriented x 3, NAD  LEFT HAND: no deformity; 4th finger with full range of motion with triggering at the PIP with need to manually extend if it locks in full flexion; mild TTP of the palmar aspect of the 4th MTP but no palpable nodule    No results found for any visits on 09/10/24.        Signed Electronically by: Gilberto Alonzo MD    "

## 2024-09-22 ENCOUNTER — HEALTH MAINTENANCE LETTER (OUTPATIENT)
Age: 79
End: 2024-09-22

## 2024-09-27 NOTE — PROGRESS NOTES
ASSESSMENT & PLAN    Modesto was seen today for pain.    Diagnoses and all orders for this visit:    Trigger finger, left ring finger  -     Orthopedic  Referral  -     XR Hand Left G/E 3 Views; Future  -     Hand / Upper Extremity Injection/Arthrocentesis: L ring A1    Pain in left finger(s)  -     Orthopedic  Referral  -     XR Hand Left G/E 3 Views; Future  -     Hand / Upper Extremity Injection/Arthrocentesis: L ring A1    Other orders  -     Cancel: Large Joint Injection/Arthocentesis      This issue is sub acute and Worsening.      # Left ring finger issue: Anil Gutierres Jr.  was seen today for left finger sticking and pain. Symptoms had been going on for 2.5 months. On examination there are positive findings of a1 pulley tenderness, thickening and triggering. Imaging findings showed CMC (thumb arthritis), but no significant issues of the ring finger. Likely cause of patient's condition due to trigger finger. Counseled patient on nature of condition and treatment options. Patient elected to proceed with trigger finger injection today rather than referral to hand surgery.     - Treatment: Activities as tolerated. Continue splits at night as needed  - Medications/Injections: Limited tylenol/ibuprofen for pain for 1-2 weeks. Topical Voltaren gel okay as well  - tolerated steroid injection for trigger finger of left ring finger today    Follow-up: In 4-6 weeks if symptoms do not improve, sooner if worsening  Can consider referral to hand surgery, hand therapy    Richard Rouse MD  [facility]    -----  Chief Complaint   Patient presents with    Left Hand - Pain     Ring finger       SUBJECTIVE  Anil Gutierres Jr. is a/an 78 year old male who is seen in consultation at the request of  Gilberto Alonzo M.D. for evaluation of left ring finger.     The patient is seen by themselves.  The patient is Left handed    Onset: 2.5 month(s) ago. The patient  "reports that he was chopping a tree that had fallen in his backyard, and noticing sometime after that his finger was \"getting stuck.\"  Location of Pain: left ring finger, PIP and distal 4th metacarpal  Worsened by: sleeping without splint  Better with: splint  Treatments tried: splint, leann taping  Associated symptoms: swelling and locking or catching    Orthopedic/Surgical history: NO  Social History/Occupation: retired; enjoys outdoor work      REVIEW OF SYSTEMS:  Review of Systems    OBJECTIVE:  BP (!) 166/88   Pulse 53   Ht 1.803 m (5' 11\")   Wt 115.7 kg (255 lb)   BMI 35.57 kg/m     General: healthy, alert and in no distress  Skin: no suspicious lesions or rash.  CV: distal perfusion intact   Resp: normal respiratory effort without conversational dyspnea   Psych: normal mood and affect  Gait: NORMAL  Neuro: Normal light sensory exam of left upepr extremity     LEFT HAND  Inspection:    No swelling, bruising, discoloration, or obvious deformity or asymmetry  Palpation:   Carpals: normal   Metacarpals: normal   Thumb: normal   Fingers: A1 Pulley tenderness, thickening  Range of Motion:    Full active flexion and extension at MCP, PIP, and DIP joints; normal finger cascade without malrotation.  Wrist pronation, supination, and ulnar/radial deviation normal.  Strength:    5/5 in all directions  Special Tests:    Positive: palpable triggering of L ring finger at A1 pulley     RADIOLOGY:  Final results and radiologist's interpretation, available in the [facility] health record.  Images were reviewed with the patient in the office today.  My personal interpretation of the performed imaging: CMC (thumb arthritis), but no significant issues of the ring finger    Hand / Upper Extremity Injection/Arthrocentesis: L ring A1    Date/Time: 9/30/2024 9:04 AM    Performed by: Richard Rouse MD  Authorized by: Richard Rouse MD    Indications:  Pain  Needle Size:  25 G  Guidance: ultrasound    Approach:  " Volar  Condition: trigger finger    Location:  Ring finger    Site:  L ring A1  Medications:  0.5 mL ROPivacaine 5 MG/ML; 3 mg betamethasone acet & sod phos 6 (3-3) MG/ML  Outcome:  Tolerated well, no immediate complications  Procedure discussed: discussed risks, benefits, and alternatives    Consent Given by:  Patient  Timeout: timeout called immediately prior to procedure    Prep: patient was prepped and draped in usual sterile fashion     Patient tolerated left ring trigger finger injection. Ultrasound guidance was required to ensure correct needle placement for injection  and avoid vital surrounding structures. Ultrasound guided images were permanently stored. Aftercare instructions given to patient. Plan to follow-up as previously discussed with referring provider.     Richard Rouse MD

## 2024-09-30 ENCOUNTER — ANCILLARY PROCEDURE (OUTPATIENT)
Dept: GENERAL RADIOLOGY | Facility: CLINIC | Age: 79
End: 2024-09-30
Attending: STUDENT IN AN ORGANIZED HEALTH CARE EDUCATION/TRAINING PROGRAM
Payer: COMMERCIAL

## 2024-09-30 ENCOUNTER — OFFICE VISIT (OUTPATIENT)
Dept: ORTHOPEDICS | Facility: CLINIC | Age: 79
End: 2024-09-30
Attending: FAMILY MEDICINE
Payer: COMMERCIAL

## 2024-09-30 VITALS
DIASTOLIC BLOOD PRESSURE: 88 MMHG | HEART RATE: 53 BPM | HEIGHT: 71 IN | BODY MASS INDEX: 35.7 KG/M2 | WEIGHT: 255 LBS | SYSTOLIC BLOOD PRESSURE: 166 MMHG

## 2024-09-30 DIAGNOSIS — M79.645 PAIN IN LEFT FINGER(S): ICD-10-CM

## 2024-09-30 DIAGNOSIS — M65.342 TRIGGER FINGER, LEFT RING FINGER: Primary | ICD-10-CM

## 2024-09-30 DIAGNOSIS — M65.342 TRIGGER FINGER, LEFT RING FINGER: ICD-10-CM

## 2024-09-30 PROCEDURE — 73130 X-RAY EXAM OF HAND: CPT | Mod: TC | Performed by: RADIOLOGY

## 2024-09-30 PROCEDURE — 20550 NJX 1 TENDON SHEATH/LIGAMENT: CPT | Mod: F3 | Performed by: STUDENT IN AN ORGANIZED HEALTH CARE EDUCATION/TRAINING PROGRAM

## 2024-09-30 PROCEDURE — 99203 OFFICE O/P NEW LOW 30 MIN: CPT | Mod: 25 | Performed by: STUDENT IN AN ORGANIZED HEALTH CARE EDUCATION/TRAINING PROGRAM

## 2024-09-30 RX ORDER — BETAMETHASONE SODIUM PHOSPHATE AND BETAMETHASONE ACETATE 3; 3 MG/ML; MG/ML
3 INJECTION, SUSPENSION INTRA-ARTICULAR; INTRALESIONAL; INTRAMUSCULAR; SOFT TISSUE
Status: SHIPPED | OUTPATIENT
Start: 2024-09-30

## 2024-09-30 RX ORDER — ROPIVACAINE HYDROCHLORIDE 5 MG/ML
0.5 INJECTION, SOLUTION EPIDURAL; INFILTRATION; PERINEURAL
Status: SHIPPED | OUTPATIENT
Start: 2024-09-30

## 2024-09-30 RX ADMIN — ROPIVACAINE HYDROCHLORIDE 0.5 ML: 5 INJECTION, SOLUTION EPIDURAL; INFILTRATION; PERINEURAL at 09:04

## 2024-09-30 RX ADMIN — BETAMETHASONE SODIUM PHOSPHATE AND BETAMETHASONE ACETATE 3 MG: 3; 3 INJECTION, SUSPENSION INTRA-ARTICULAR; INTRALESIONAL; INTRAMUSCULAR; SOFT TISSUE at 09:04

## 2024-09-30 NOTE — PATIENT INSTRUCTIONS
# Left ring finger issue: Anil Gutierres Jr.  was seen today for left trigger finger. Symptoms had been going on for 2.5 months. On examination there are positive findings of a1 pulley tenderness, thickening and triggering. Imaging findings showed CMC (thumb arthritis), but no significant issues of the ring finger. Likely cause of patient's condition due to trigger finger. Counseled patient on nature of condition and treatment options.     - Treatment: Activities as tolerated.   - Medications/Injections: Limited tylenol/ibuprofen for pain for 1-2 weeks. Topical Voltaren gel okay as well      Follow-up: In 4-6 weeks if symptoms do not improve, sooner if worsening  Can consider referral to hand surgery    Please call [phone] and ask for my team if you have any questions or concerns.       [facility] Discharge Instructions    Procedure: left trigger finger steroid injection (ring finger)    You may shower, however avoid swimming, tub baths or hot tubs for 24 hours following your procedure  You may have a mild to moderate increase in pain for several days following the injection.  It may take up to 14 days for the steroid medication to start working although you may feel the effect as early as a few days after the procedure.  You may use ice packs for 10-15 minutes, 3 to 4 times a day at the injection site for comfort  You may use anti-inflammatory medications (such as Ibuprofen or Aleve or Advil) or Tylenol for pain control if necessary  If you were fasting, you may resume your normal diet and medications after the procedure  If you have diabetes, check your blood sugar more frequently than usual as your blood sugar may be higher than normal for 10-14 days following a steroid injection. Contact your doctor who manages your diabetes if your blood sugar is higher than usual    If you experience any of the following, call [facility] @ [phone] or [phone]  - Fever over 100 degree F  - Swelling, bleeding,  redness, drainage, warmth at the injection site  - New or worsening pain

## 2024-09-30 NOTE — LETTER
9/30/2024      Anil Gutierres Jr.  [address] [address] [id number]      Dear Colleague,    Thank you for referring your patient, Anil Gutierres Jr., to the [facility]. Please see a copy of my visit note below.    ASSESSMENT & PLAN    Modesto was seen today for pain.    Diagnoses and all orders for this visit:    Trigger finger, left ring finger  -     Orthopedic  Referral  -     XR Hand Left G/E 3 Views; Future  -     Hand / Upper Extremity Injection/Arthrocentesis: L ring A1    Pain in left finger(s)  -     Orthopedic  Referral  -     XR Hand Left G/E 3 Views; Future  -     Hand / Upper Extremity Injection/Arthrocentesis: L ring A1    Other orders  -     Cancel: Large Joint Injection/Arthocentesis      This issue is sub acute and Worsening.      # Left ring finger issue: Anil Gutierres Jr.  was seen today for left finger sticking and pain. Symptoms had been going on for 2.5 months. On examination there are positive findings of a1 pulley tenderness, thickening and triggering. Imaging findings showed CMC (thumb arthritis), but no significant issues of the ring finger. Likely cause of patient's condition due to trigger finger. Counseled patient on nature of condition and treatment options. Patient elected to proceed with trigger finger injection today rather than referral to hand surgery.     - Treatment: Activities as tolerated. Continue splits at night as needed  - Medications/Injections: Limited tylenol/ibuprofen for pain for 1-2 weeks. Topical Voltaren gel okay as well  - tolerated steroid injection for trigger finger of left ring finger today    Follow-up: In 4-6 weeks if symptoms do not improve, sooner if worsening  Can consider referral to hand surgery, hand therapy    Ricahrd Rouse MD  [facility]    -----  Chief Complaint   Patient presents with     Left Hand - Pain     Ring finger       SUBJECTIVE  Anil  "SUNDAY Gutierres Jr. is a/an 78 year old male who is seen in consultation at the request of  Gilberto Alonzo M.D. for evaluation of left ring finger.     The patient is seen by themselves.  The patient is Left handed    Onset: 2.5 month(s) ago. The patient reports that he was chopping a tree that had fallen in his backyard, and noticing sometime after that his finger was \"getting stuck.\"  Location of Pain: left ring finger, PIP and distal 4th metacarpal  Worsened by: sleeping without splint  Better with: splint  Treatments tried: splint, leann taping  Associated symptoms: swelling and locking or catching    Orthopedic/Surgical history: NO  Social History/Occupation: retired; enjoys outdoor work      REVIEW OF SYSTEMS:  Review of Systems    OBJECTIVE:  BP (!) 166/88   Pulse 53   Ht 1.803 m (5' 11\")   Wt 115.7 kg (255 lb)   BMI 35.57 kg/m     General: healthy, alert and in no distress  Skin: no suspicious lesions or rash.  CV: distal perfusion intact   Resp: normal respiratory effort without conversational dyspnea   Psych: normal mood and affect  Gait: NORMAL  Neuro: Normal light sensory exam of left upepr extremity     LEFT HAND  Inspection:    No swelling, bruising, discoloration, or obvious deformity or asymmetry  Palpation:   Carpals: normal   Metacarpals: normal   Thumb: normal   Fingers: A1 Pulley tenderness, thickening  Range of Motion:    Full active flexion and extension at MCP, PIP, and DIP joints; normal finger cascade without malrotation.  Wrist pronation, supination, and ulnar/radial deviation normal.  Strength:    5/5 in all directions  Special Tests:    Positive: palpable triggering of L ring finger at A1 pulley     RADIOLOGY:  Final results and radiologist's interpretation, available in the [facility] health record.  Images were reviewed with the patient in the office today.  My personal interpretation of the performed imaging: CMC (thumb arthritis), but no significant issues of the ring finger    Hand " / Upper Extremity Injection/Arthrocentesis: L ring A1    Date/Time: 9/30/2024 9:04 AM    Performed by: Richard Rouse MD  Authorized by: Richard Rouse MD    Indications:  Pain  Needle Size:  25 G  Guidance: ultrasound    Approach:  Volar  Condition: trigger finger    Location:  Ring finger    Site:  L ring A1  Medications:  0.5 mL ROPivacaine 5 MG/ML; 3 mg betamethasone acet & sod phos 6 (3-3) MG/ML  Outcome:  Tolerated well, no immediate complications  Procedure discussed: discussed risks, benefits, and alternatives    Consent Given by:  Patient  Timeout: timeout called immediately prior to procedure    Prep: patient was prepped and draped in usual sterile fashion     Patient tolerated left ring trigger finger injection. Ultrasound guidance was required to ensure correct needle placement for injection  and avoid vital surrounding structures. Ultrasound guided images were permanently stored. Aftercare instructions given to patient. Plan to follow-up as previously discussed with referring provider.     Richard Rouse MD          Again, thank you for allowing me to participate in the care of your patient.        Sincerely,        Richard Rouse MD

## 2024-12-05 ENCOUNTER — LAB (OUTPATIENT)
Dept: LAB | Facility: CLINIC | Age: 79
End: 2024-12-05
Payer: COMMERCIAL

## 2024-12-05 DIAGNOSIS — M25.569 KNEE PAIN: ICD-10-CM

## 2024-12-05 DIAGNOSIS — E11.22 TYPE 2 DIABETES MELLITUS WITH STAGE 3 CHRONIC KIDNEY DISEASE, WITHOUT LONG-TERM CURRENT USE OF INSULIN, UNSPECIFIED WHETHER STAGE 3A OR 3B CKD (H): ICD-10-CM

## 2024-12-05 DIAGNOSIS — E03.9 HYPOTHYROIDISM, UNSPECIFIED TYPE: ICD-10-CM

## 2024-12-05 DIAGNOSIS — N18.30 TYPE 2 DIABETES MELLITUS WITH STAGE 3 CHRONIC KIDNEY DISEASE, WITHOUT LONG-TERM CURRENT USE OF INSULIN, UNSPECIFIED WHETHER STAGE 3A OR 3B CKD (H): ICD-10-CM

## 2024-12-05 DIAGNOSIS — M25.569 KNEE PAIN: Primary | ICD-10-CM

## 2024-12-05 DIAGNOSIS — N18.30 TYPE 2 DIABETES MELLITUS WITH STAGE 3 CHRONIC KIDNEY DISEASE, WITHOUT LONG-TERM CURRENT USE OF INSULIN, UNSPECIFIED WHETHER STAGE 3A OR 3B CKD (H): Primary | ICD-10-CM

## 2024-12-05 DIAGNOSIS — E11.22 TYPE 2 DIABETES MELLITUS WITH STAGE 3 CHRONIC KIDNEY DISEASE, WITHOUT LONG-TERM CURRENT USE OF INSULIN, UNSPECIFIED WHETHER STAGE 3A OR 3B CKD (H): Primary | ICD-10-CM

## 2024-12-05 LAB
CHOLEST SERPL-MCNC: 205 MG/DL
CRP SERPL-MCNC: <3 MG/L
ERYTHROCYTE [SEDIMENTATION RATE] IN BLOOD BY WESTERGREN METHOD: 12 MM/HR (ref 0–20)
EST. AVERAGE GLUCOSE BLD GHB EST-MCNC: 146 MG/DL
HBA1C MFR BLD: 6.7 % (ref 0–5.6)
HDLC SERPL-MCNC: 39 MG/DL
LDLC SERPL CALC-MCNC: 139 MG/DL
NONHDLC SERPL-MCNC: 166 MG/DL
TRIGL SERPL-MCNC: 133 MG/DL
TSH SERPL DL<=0.005 MIU/L-ACNC: 2.62 UIU/ML (ref 0.3–4.2)

## 2024-12-05 NOTE — PROGRESS NOTES
"Modesto Laird was just in for labs. He thought he was supposed to being having annual labs too. He came in fasting and if you wanted to add anything on to what was drawn today just order it as \"add on\"  Thank you,  [facility]  "

## 2024-12-08 ENCOUNTER — MYC REFILL (OUTPATIENT)
Dept: FAMILY MEDICINE | Facility: CLINIC | Age: 79
End: 2024-12-08
Payer: COMMERCIAL

## 2024-12-08 DIAGNOSIS — I10 UNCONTROLLED HYPERTENSION: ICD-10-CM

## 2024-12-08 DIAGNOSIS — I89.0 LYMPHEDEMA: ICD-10-CM

## 2024-12-09 DIAGNOSIS — I10 UNCONTROLLED HYPERTENSION: ICD-10-CM

## 2024-12-09 RX ORDER — HYDROCHLOROTHIAZIDE 12.5 MG/1
12.5 TABLET ORAL DAILY
Qty: 90 TABLET | Refills: 3 | OUTPATIENT
Start: 2024-12-09

## 2024-12-09 RX ORDER — LISINOPRIL 40 MG/1
40 TABLET ORAL DAILY
Qty: 90 TABLET | Refills: 3 | Status: SHIPPED | OUTPATIENT
Start: 2024-12-09

## 2024-12-09 RX ORDER — ATENOLOL 25 MG/1
12.5 TABLET ORAL DAILY
Qty: 45 TABLET | Refills: 3 | OUTPATIENT
Start: 2024-12-09

## 2024-12-09 RX ORDER — LISINOPRIL 40 MG/1
40 TABLET ORAL DAILY
Qty: 90 TABLET | Refills: 3 | OUTPATIENT
Start: 2024-12-09

## 2025-01-23 DIAGNOSIS — N18.31 STAGE 3A CHRONIC KIDNEY DISEASE (H): Primary | ICD-10-CM

## 2025-01-30 ENCOUNTER — LAB (OUTPATIENT)
Dept: LAB | Facility: CLINIC | Age: 80
End: 2025-01-30
Payer: COMMERCIAL

## 2025-01-30 DIAGNOSIS — N18.31 STAGE 3A CHRONIC KIDNEY DISEASE (H): ICD-10-CM

## 2025-01-30 LAB
ALBUMIN MFR UR ELPH: 25.1 MG/DL
ALBUMIN SERPL BCG-MCNC: 4.3 G/DL (ref 3.5–5.2)
ALBUMIN UR-MCNC: NEGATIVE MG/DL
ANION GAP SERPL CALCULATED.3IONS-SCNC: 9 MMOL/L (ref 7–15)
APPEARANCE UR: CLEAR
BILIRUB UR QL STRIP: NEGATIVE
BUN SERPL-MCNC: 31.3 MG/DL (ref 8–23)
CALCIUM SERPL-MCNC: 9.7 MG/DL (ref 8.8–10.4)
CHLORIDE SERPL-SCNC: 101 MMOL/L (ref 98–107)
COLOR UR AUTO: YELLOW
CREAT SERPL-MCNC: 1.52 MG/DL (ref 0.67–1.17)
CREAT UR-MCNC: 72.7 MG/DL
EGFRCR SERPLBLD CKD-EPI 2021: 47 ML/MIN/1.73M2
ERYTHROCYTE [DISTWIDTH] IN BLOOD BY AUTOMATED COUNT: 12.9 % (ref 10–15)
GLUCOSE SERPL-MCNC: 134 MG/DL (ref 70–99)
GLUCOSE UR STRIP-MCNC: NEGATIVE MG/DL
HCO3 SERPL-SCNC: 26 MMOL/L (ref 22–29)
HCT VFR BLD AUTO: 44.8 % (ref 40–53)
HGB BLD-MCNC: 14.8 G/DL (ref 13.3–17.7)
HGB UR QL STRIP: NEGATIVE
KETONES UR STRIP-MCNC: NEGATIVE MG/DL
LEUKOCYTE ESTERASE UR QL STRIP: NEGATIVE
MCH RBC QN AUTO: 30 PG (ref 26.5–33)
MCHC RBC AUTO-ENTMCNC: 33 G/DL (ref 31.5–36.5)
MCV RBC AUTO: 91 FL (ref 78–100)
NITRATE UR QL: NEGATIVE
PH UR STRIP: 6.5 [PH] (ref 5–7)
PHOSPHATE SERPL-MCNC: 3 MG/DL (ref 2.5–4.5)
PLATELET # BLD AUTO: 215 10E3/UL (ref 150–450)
POTASSIUM SERPL-SCNC: 5 MMOL/L (ref 3.4–5.3)
PROT/CREAT 24H UR: 0.35 MG/MG CR (ref 0–0.2)
PTH-INTACT SERPL-MCNC: 80 PG/ML (ref 15–65)
RBC # BLD AUTO: 4.93 10E6/UL (ref 4.4–5.9)
RBC #/AREA URNS AUTO: NORMAL /HPF
SODIUM SERPL-SCNC: 136 MMOL/L (ref 135–145)
SP GR UR STRIP: 1.02 (ref 1–1.03)
UROBILINOGEN UR STRIP-ACNC: 0.2 E.U./DL
VIT D+METAB SERPL-MCNC: 12 NG/ML (ref 20–50)
WBC # BLD AUTO: 6.6 10E3/UL (ref 4–11)
WBC #/AREA URNS AUTO: NORMAL /HPF

## 2025-02-03 NOTE — PROGRESS NOTES
Nephrology Clinic Visit  Anil Gutierres Jr. MRN: [id number] YOB: 1945  Primary Care Provider: Gilberto Alonzo  ----------------------------------------------------------------------------------------------------------------------  Visit 2/4/2025:  --BP Control: Diagnosed about 20 years or so.   --In office: 193/102 today in office.  --At home: Nothing much lower than 140-150 range at home. Can get as high as 190 per his report.  --Current Regimen: Atenolol 12.5mg qDay, hydrochlorothiazide 12.5mg qDay, Lisinopril 40mg qDay  -DM Control: 6.7 (12/5/2024)  --Current Regimen: None  -Creatinine Trend: 1.52 (1/30/2025) from 1.59 (8/6/2024)  -Nocturia: 1x most nights.   -Hematuria: No  -Nephrolithiasis: No  -NSAID Use: Rare ibuprofen use. Discussed voltaren  -Herbal/OTC Medication Use: No  -Family History of Kidney Disease: Not that he knows of  -Family/Friends on RRT/Kidney Transplant: No/No  -Social History: [place], Lives with wife, House, [place] is [place] miles, Alcohol: ~7 drinks, Tobacco use: quit in 1984 (smoker since 1964). [address], works on 3 acres of land, maintains paths in the woods    Other:  -Chronic lymphedema. Has followed in lymphedema clinic in the past. Does not have compression stockings on.  -We discussed sodium restriction.     Objective:  PAST MEDICAL HISTORY:  Past Medical History:   Diagnosis Date    Arthritis 2018    L knee, R hip    Basal cell carcinoma     Coronary artery disease involving native heart without angina pectoris, unspecified vessel or lesion type 6/27/2022    Diabetes (H) 2000    Type 2    Hypertension 2000    Thyroid disease 2000    Hypothyroidism       PAST SURGICAL HISTORY:  Past Surgical History:   Procedure Laterality Date    BIOPSY  2021    Tongue    COLONOSCOPY  2017    COLONOSCOPY N/A 11/7/2022    Procedure: COLONOSCOPY;  Surgeon: Greer White MD;  Location: [facility]    EYE SURGERY  2019    cataracts    GLOSSECTOMY PARTIAL Right  12/2/2021    Procedure: GLOSSECTOMY, PARTIAL;  Surgeon: Rojelio Horton MD;  Location: [facility]    ORTHOPEDIC SURGERY  dec  23, 2020    R Hip       MEDICATIONS:  Current Outpatient Medications   Medication Instructions    aspirin (ASA) 81 mg, Oral, DAILY    atenolol (TENORMIN) 12.5 mg, Oral, DAILY    cetirizine (ZYRTEC) 10 mg, Oral, EVERY EVENING    furosemide (LASIX) 20 mg, Oral, PRN    Glucose Blood (BLOOD GLUCOSE TEST STRIPS) STRP In Vitro, DAILY    hydroCHLOROthiazide 12.5 mg, Oral, DAILY    levothyroxine (SYNTHROID/LEVOTHROID) 137 MCG tablet TAKE 1 TABLET BY MOUTH DAILY.  WELLNESS EXAM    lisinopril (ZESTRIL) 40 mg, Oral, DAILY, SCHEDULE NURSE ONLY VISIT WITHIN NEXT 30 DAYS TO RECHECK BLOOD PRESSURE!    loratadine (CLARITIN) 10 mg, Oral       FAMILY MEDICAL HISTORY:   Family History   Problem Relation Age of Onset    Arthritis Mother     Cerebrovascular Disease Father     Diabetes Sister        PHYSICAL EXAM:   BP (!) 193/102   Pulse 76   Wt 117.9 kg (260 lb)   BMI 36.26 kg/m    GENERAL APPEARANCE: alert and no distress  EYES: nonicteric  HENT: mouth without ulcers or lesions  RESP: lungs clear to auscultation   Extremities: B/L Chronic LE Edema (hx of lymphedema, also has 1+ b/l pitting edema)  MS: no evidence of inflammation in joints, no muscle tenderness  SKIN: no rash  NEURO: mentation intact and speech normal  PSYCH: affect normal    LABS REVIEWED BY ME:   ANEMIA  Recent Labs   Lab Test 01/30/25  1025 08/06/24  1106 11/13/23  1445 12/08/22  0856   HGB 14.8 14.3 14.3 14.2       BMP  Recent Labs   Lab Test 01/30/25  1025 08/06/24  1106 07/19/24  1037 06/28/24  1135 11/13/23  1445     --  137 139 137   POTASSIUM 5.0  --  5.1 4.8 4.6   CHLORIDE 101  --  101 101 100   CO2 26  --  25 28 26   ANIONGAP 9  --  11 10 11   BUN 31.3*  --  47.7* 28.6* 21.6   CR 1.52* 1.59* 1.66* 1.54* 1.43*   GFRESTIMATED 47* 44* 42* 46* 51*       CBC  Recent Labs   Lab Test 01/30/25  1025 08/06/24  1106 11/13/23  1448  "12/08/22  0856 11/25/22  0837 11/25/22  0837 11/06/18  1606   HGB 14.8 14.3 14.3 14.2   < > 14.3 14.4   WBC 6.6  --   --   --   --  6.1 8.8   HCT 44.8  --   --   --   --  44.0 43.3   MCV 91  --   --   --   --  90 93     --   --   --   --  226 232    < > = values in this interval not displayed.       DIABETES  Recent Labs   Lab Test 12/05/24  1121 06/20/24  1421 11/13/23  1445 12/08/22  0856   A1C 6.7* 6.5* 6.1* 6.8*       HYPONATREMIANo lab results found.    Invalid input(s): \"UOSM\", \"OSM\"    MBD  Recent Labs   Lab Test 01/30/25  1025 07/19/24  1037 06/28/24  1135 11/13/23  1445   MILKA 9.7 9.9 9.8 10.0   ALBUMIN 4.3  --   --   --    PHOS 3.0  --   --   --    PTHI 80*  --   --   --         URINE STUDIES  Recent Labs   Lab Test 01/30/25  1025 08/06/24  1106 01/05/23  1030 12/08/22  0856   COLOR Yellow Yellow Yellow Yellow   APPEARANCE Clear Clear Clear Clear   URINEGLC Negative Negative Negative Negative   URINEBILI Negative Negative Negative Negative   URINEKETONE Negative Negative Negative Negative   SG 1.020 1.020 1.025 >=1.030   UBLD Negative Negative Negative Trace*   URINEPH 6.5 5.5 5.5 5.5   PROTEIN Negative Trace* Negative Trace*   UROBILINOGEN 0.2 0.2 0.2 0.2   NITRITE Negative Negative Negative Negative   LEUKEST Negative Negative Negative Negative   RBCU None Seen 0-2  --  2-5*   WBCU None Seen 0-5  --  0-5     No lab results found.    ADDITIONAL LABS ORDERED/REVIEWED BY ME:  See below    Assessment/Plan  CKD Stage G3aA2  Established care with [facility] 2/4/2025    79 year old male w/ Hx notable for Hypertension, DMT2, Lymphedema, Obesity    Stable CKD dating back to at least 11/6/2018 (creatinine 1.46 at that time). Creatinine 1.52 at time of establishing care with me.    UA bland 1/30/2025  UPCR 0.35g/gr 1/30/2025  UACR 122mg/gr 8/6/2024    No recent renal imaging.    CKD Etiology (Biopsy Proven: No):  -Hypertensive nephrosclerosis  -DKD    Plan:  -Optimize Bp Control:  --Adjust " hydrochlorothiazide 12.5mg qDay to Chlorthalidone 25mg every other day.  --Continue Lisinopril 40mg qDay  --Continue Atenolol 12.5mg qDay for now  ---Will see where his BP trends with adjustment to chlorthalidone. Next addition likely Spironolactone if we can get ourselves enough K room to safely add this agent. May be able to get him off Atenolol in the future (I don't see cardiac indication for beta blocker)  ---I've asked my nurse to reach out to him in 2 weeks for updated numbers  ---Repeat BMP in 2 weeks to monitor renal function closely with above adjustment  -Optimize DM Control:  --A1c < 7. Discussed diet and lifestyle interventions  --Discussed potential addition of SGLT2i in the future if A1c > 7 or if eGFR < 45 or if UACR > 200mg/gr  -Discussed avoidance of all PO NSAIDs. He uses Ibuprofen for chronic knee pain (relatively rare use). Advised tylenol and topical diclofenac gel  -Discussed low Na diet. Given NKF handout  -Remainder per problem below      Anemia of Renal Disease  Hemoglobin   Date Value Ref Range Status   01/30/2025 14.8 13.3 - 17.7 g/dL Final   11/06/2018 14.4 13.3 - 17.7 g/dL Final       Plan:  -No need for IV iron or SAUNDRA      Lipid Management in CKD  KDIGO 2013 Guidelines recommend the following for patients with CKD:  Adults >/= 51 yo w/ CKD stage 1 or 2: Statin  Adults >/= 51 yo w/ CKD stage 3-5: Statin + Ezetimibe or Statin alone  Adults 18-49 + 1 of the following (CAD, DM, Ischemic stroke, 10 yr ASCVD risk >10%): Statin    KDIGO guidelines recommend Simvastatin 20mg/Ezetimibe 10mg qDay for patients with CKD G3a-G5 (in line with the SHARP trial). If Simvastatin used alone, 40mg qDay is referenced.   Other options include: Atorvastatin 20mg qDay (4D trial) and Rosuvastatin 10mg qDay (ELIZABETH trial)    Plan:  -Discussed benefit of statin given DM and CKD. He previously had some potential myalgia symptoms related to a statin. Will discuss again at next visit. Priority at the moment is  strict BP control      Metabolic Acidosis of Renal Disease  Bicarb: 26 (1/30/2025)    Plan:  -No need for NaHCO at this time      Mineral Bone Disease  KDIGO 2017 Guidelines recommend the following for patients with CKD L3l-U6E:  -Treatments of CKD MBD should be based on serial assessments of phosphate, calcium, and PTH levels, considered together (Not Graded).  -Lowering elevated phosphate levels toward the normal range (2C) and guiding decisions about phosphate-lowering treatment should be based on progressively or persistently elevated serum phosphate (Not Graded). Limiting dietary phosphate intake in the treatment of hyperphosphatemiaalone or in combination with other treatments (2D). It is reasonable to consider phosphate source (e.g., animal, vegetable, additives) in making dietary recommendations (Not Graded).  -Avoiding hypercalcemia (2C)  -In patients with CKD G3a-G5 not on dialysis, the optimal PTH level is not known. However, we suggest that patients with levels of intact PTH progressively rising or persistently above the upper normal limit for the assay be evaluated for modifiable factors, including hyperphosphatemia, hypocalcemia, high phosphate intake, and vitamin D deficiency (2C).  -In adult patients with CKD G3a-G5 not on dialysis, we suggest that calcitriol and vitamin D analogs not be routinely used (2C). It is reasonable to reserve the use of calcitriol and vitamin D analogs for patients with CKD G4-G5 with severe and progressive hyperparathyroidism (Not Graded).    Parathyroid Hormone Intact   Date Value Ref Range Status   01/30/2025 80 (H) 15 - 65 pg/mL Final     Phosphorus   Date Value Ref Range Status   01/30/2025 3.0 2.5 - 4.5 mg/dL Final     Calcium   Date Value Ref Range Status   01/30/2025 9.7 8.8 - 10.4 mg/dL Final   07/19/2024 9.9 8.8 - 10.4 mg/dL Final     Comment:     Reference intervals for this test were updated on 7/16/2024 to reflect our healthy population more accurately. There  may be differences in the flagging of prior results with similar values performed with this method. Those prior results can be interpreted in the context of the updated reference intervals.   11/06/2018 9.2 8.5 - 10.1 mg/dL Final     Vitamin D, Total (25-Hydroxy)   Date Value Ref Range Status   01/30/2025 12 (L) 20 - 50 ng/mL Final     Comment:     mild to moderate deficiency       Plan:  -Vitamin D is quite low. He will start 2000 international unit(s) qDay (OTC) supplement 2/4/2025    Other:  -Specialty Care Coordination Referral - CKD G1-G3b w/o imminent RRT planning/needs (Yes/no, Date Referral Placed): No  -Med Therapy Management Referral (Yes/No, Date of Referral): No    Return to clinic: 6/24/2025 at Noon as a 30 minute add on appt    Michael Loya MD   of Medicine  Division of Nephrology and Hypertension  [facility]    45 minutes spent on the date of the encounter doing chart review, history and exam, documentation and further activities as noted above    The longitudinal plan of care for Hypertension, DM, CKD G3aA2 was addressed during this visit. Due to the added complexity in care, I will continue to support Anil Gutierres Jr. in the subsequent management of this condition(s) and with the ongoing continuity of care of this condition(s).

## 2025-02-04 ENCOUNTER — OFFICE VISIT (OUTPATIENT)
Dept: NEPHROLOGY | Facility: CLINIC | Age: 80
End: 2025-02-04
Payer: COMMERCIAL

## 2025-02-04 VITALS
DIASTOLIC BLOOD PRESSURE: 102 MMHG | SYSTOLIC BLOOD PRESSURE: 193 MMHG | BODY MASS INDEX: 36.26 KG/M2 | WEIGHT: 260 LBS | HEART RATE: 76 BPM

## 2025-02-04 DIAGNOSIS — N18.30 TYPE 2 DIABETES MELLITUS WITH STAGE 3 CHRONIC KIDNEY DISEASE, WITHOUT LONG-TERM CURRENT USE OF INSULIN, UNSPECIFIED WHETHER STAGE 3A OR 3B CKD (H): Primary | ICD-10-CM

## 2025-02-04 DIAGNOSIS — I10 PRIMARY HYPERTENSION: ICD-10-CM

## 2025-02-04 DIAGNOSIS — E55.9 VITAMIN D DEFICIENCY: ICD-10-CM

## 2025-02-04 DIAGNOSIS — E11.29 MICROALBUMINURIA DUE TO TYPE 2 DIABETES MELLITUS (H): ICD-10-CM

## 2025-02-04 DIAGNOSIS — E11.22 TYPE 2 DIABETES MELLITUS WITH STAGE 3 CHRONIC KIDNEY DISEASE, WITHOUT LONG-TERM CURRENT USE OF INSULIN, UNSPECIFIED WHETHER STAGE 3A OR 3B CKD (H): Primary | ICD-10-CM

## 2025-02-04 DIAGNOSIS — Z91.89 AT INCREASED RISK FOR CARDIOVASCULAR DISEASE: ICD-10-CM

## 2025-02-04 DIAGNOSIS — R80.9 MICROALBUMINURIA DUE TO TYPE 2 DIABETES MELLITUS (H): ICD-10-CM

## 2025-02-04 RX ORDER — CHLORTHALIDONE 25 MG/1
25 TABLET ORAL EVERY OTHER DAY
Qty: 45 TABLET | Refills: 3 | Status: SHIPPED | OUTPATIENT
Start: 2025-02-04 | End: 2026-01-30

## 2025-02-04 NOTE — PATIENT INSTRUCTIONS
"It was a pleasure to see you in nephrology clinic today. I've included a brief summary of our discussion and care plan from today's visit below.  _______________________________________________________________________    My recommendations are summarized as follows:  -Keep a Blood Pressure log. Please make sure that you are using a validated blood pressure device (check \"www.validatebp.org\").  -Avoid all NSAID's. Examples include Ibuprofen (Advil, Motrin), naprosyn (Aleve), celebrex among others. Acetaminophen (Tylenol) is ok with maximum dose in 24 hours of 3000mg.  -Healthy lifestyle measures will keep your kidney's functioning at their current best. This includes regular exercise, maintaining a healthy body weight and smoking cessation.   -I would like to transition your hydrochlorothiazide medication to Chlorthalidone. I will start you on 25mg every other day. We will repeat labs in 2 weeks to make sure potassium and creatinine numbers are stable with this change  -Continue your atenolol and your lisinopril as prescribed for now.  -Please take your blood pressure at home daily. In 2 weeks, I will have my nurse reach out to you to review your readings  -I would like you to start 2000 international unit(s) (50mcg) of vitamin D per day. Your vitamin D level was 12. I would like this number to be between 30 and 40 ideally.   -Please follow that low sodium diet guide I gave you.    NKF Low Sodium Diet  (https://www.kidney.org/sites/default/files/02-10-0412_EBB_Sodium.pdf)     Please return to Nephrology Clinic in 6/24/2025 at Noon to 12:30PM to review your progress.     Who do I call with any questions after my visit?  There are multiple ways to contact your nephrology care team:    -To schedule or reschedule an appointment, please call [phone].  -To contact my nurse Genevieve, please call [phone]  -Reach out via MyChart. These messages are answered by your nurse or doctor during business hours and typically in " 1-2 days. Quicko[employer]sh[employer] messages are best for quick questions/clarifications/updates. Frequently, your doctor or nurse will recommend setting up a follow up appointment to address any significant questions/concerns.  -For urgent questions after business hours, you may reach the on-call Nephrology Fellow by contacting the [facility]  at [phone].    To schedule imaging:   -Please call [phone]     To schedule your lab appointment at [facility]  -Please call [phone]    Sincerely,    Dr. Michael Loya   of Medicine  Division of Nephrology and Hypertension  [facility]

## 2025-02-18 ENCOUNTER — LAB (OUTPATIENT)
Dept: LAB | Facility: CLINIC | Age: 80
End: 2025-02-18
Payer: COMMERCIAL

## 2025-02-18 DIAGNOSIS — I10 PRIMARY HYPERTENSION: ICD-10-CM

## 2025-02-18 LAB
ANION GAP SERPL CALCULATED.3IONS-SCNC: 13 MMOL/L (ref 7–15)
BUN SERPL-MCNC: 32.2 MG/DL (ref 8–23)
CALCIUM SERPL-MCNC: 10.2 MG/DL (ref 8.8–10.4)
CHLORIDE SERPL-SCNC: 98 MMOL/L (ref 98–107)
CREAT SERPL-MCNC: 1.47 MG/DL (ref 0.67–1.17)
EGFRCR SERPLBLD CKD-EPI 2021: 49 ML/MIN/1.73M2
GLUCOSE SERPL-MCNC: 142 MG/DL (ref 70–99)
HCO3 SERPL-SCNC: 25 MMOL/L (ref 22–29)
POTASSIUM SERPL-SCNC: 4.4 MMOL/L (ref 3.4–5.3)
SODIUM SERPL-SCNC: 136 MMOL/L (ref 135–145)

## 2025-02-18 PROCEDURE — 36415 COLL VENOUS BLD VENIPUNCTURE: CPT

## 2025-02-18 PROCEDURE — 80048 BASIC METABOLIC PNL TOTAL CA: CPT

## 2025-03-15 ENCOUNTER — HEALTH MAINTENANCE LETTER (OUTPATIENT)
Age: 80
End: 2025-03-15

## 2025-03-25 ENCOUNTER — MYC REFILL (OUTPATIENT)
Dept: FAMILY MEDICINE | Facility: CLINIC | Age: 80
End: 2025-03-25
Payer: COMMERCIAL

## 2025-03-25 DIAGNOSIS — I10 UNCONTROLLED HYPERTENSION: ICD-10-CM

## 2025-03-27 RX ORDER — ATENOLOL 25 MG/1
12.5 TABLET ORAL DAILY
Qty: 45 TABLET | Refills: 3 | Status: SHIPPED | OUTPATIENT
Start: 2025-03-27

## 2025-03-31 ENCOUNTER — MYC MEDICAL ADVICE (OUTPATIENT)
Dept: FAMILY MEDICINE | Facility: CLINIC | Age: 80
End: 2025-03-31
Payer: COMMERCIAL

## 2025-03-31 DIAGNOSIS — I10 UNCONTROLLED HYPERTENSION: Primary | ICD-10-CM

## 2025-03-31 NOTE — TELEPHONE ENCOUNTER
Patient Quality Outreach    Patient is due for the following:   Hypertension -  BP check    Action(s) Taken:   Recorded patient reported vitals in chart.   Routed to provider for elevated readings.  Type of outreach:    Patient sent [employer] message with recent vitals.    Questions for provider review:    None         Tyra LUCERO LPN  Chart routed to Provider.

## 2025-04-01 ENCOUNTER — ALLIED HEALTH/NURSE VISIT (OUTPATIENT)
Dept: FAMILY MEDICINE | Facility: CLINIC | Age: 80
End: 2025-04-01
Payer: COMMERCIAL

## 2025-04-01 VITALS — SYSTOLIC BLOOD PRESSURE: 164 MMHG | DIASTOLIC BLOOD PRESSURE: 84 MMHG

## 2025-04-01 DIAGNOSIS — Z01.30 BP CHECK: Primary | ICD-10-CM

## 2025-04-01 PROCEDURE — 3077F SYST BP >= 140 MM HG: CPT | Performed by: FAMILY MEDICINE

## 2025-04-01 PROCEDURE — 99207 PR NO CHARGE NURSE ONLY: CPT | Performed by: FAMILY MEDICINE

## 2025-04-01 PROCEDURE — 3079F DIAST BP 80-89 MM HG: CPT | Performed by: FAMILY MEDICINE

## 2025-04-01 RX ORDER — AMLODIPINE BESYLATE 2.5 MG/1
2.5 TABLET ORAL DAILY
Qty: 90 TABLET | Refills: 3 | Status: SHIPPED | OUTPATIENT
Start: 2025-04-01

## 2025-04-01 NOTE — TELEPHONE ENCOUNTER
Please call patient to make sure he receives recommendations and any questions answered.    Home BPs still slightly elevated. HR is bradycardic so unable to increaes atenolol.  Other meds are maxed out.  Will add amlodipine 2.5 mg daily.  Patient may continue daily measurements.  Schedule in-clinic visit with me in 2 weeks or so to recheck BP and examine patient. May use same day slot.

## 2025-04-01 NOTE — PROGRESS NOTES
Anil Gutierres Jr. was evaluated at [facility] on April 1, 2025 at which time his blood pressure was:    BP Readings from Last 1 Encounters:   04/01/25 (!) 164/84     Systolic (Patient Reported): (!) 143 (3/31/2025 11:32 AM)  Diastolic (Patient Reported): (!) 97 (3/31/2025 11:32 AM)        Reviewed lifestyle modifications for blood pressure control and reduction: including making healthy food choices, managing weight, getting regular exercise, smoking cessation, reducing alcohol consumption, monitoring blood pressure regularly.     Symptoms: None    BP Goal:< 140/90 mmHg    BP Assessment:  BP too high    Potential Reasons for BP too high: Unknown/Other:      BP Follow-Up Plan: Referral to PCP    Recommendation to Provider: Mr Gutierres was picking up his new rx for amlodipine when we did a bp check. He will be following up with Dr. Alonzo in 2 weeks per 's advise.    Note completed by: Ramandeep Vaughn [facility]

## 2025-04-01 NOTE — TELEPHONE ENCOUNTER
Called and spoke with patient. Gave Dr. Alonzo's message below. Patient expressed understanding with no further question. Patient is scheduled for follow up in two weeks.    Dorina Catalan MA

## 2025-04-02 NOTE — PROGRESS NOTES
Patient already was advised to add amlodipine yesterday.  Patient has follow up appt in 2 weeks.  Nothing additional to recommend at this time.

## 2025-04-15 ENCOUNTER — OFFICE VISIT (OUTPATIENT)
Dept: FAMILY MEDICINE | Facility: CLINIC | Age: 80
End: 2025-04-15
Payer: COMMERCIAL

## 2025-04-15 VITALS
SYSTOLIC BLOOD PRESSURE: 120 MMHG | WEIGHT: 259 LBS | DIASTOLIC BLOOD PRESSURE: 52 MMHG | RESPIRATION RATE: 20 BRPM | OXYGEN SATURATION: 98 % | HEIGHT: 70 IN | TEMPERATURE: 97.3 F | HEART RATE: 59 BPM | BODY MASS INDEX: 37.08 KG/M2

## 2025-04-15 DIAGNOSIS — E66.01 CLASS 2 SEVERE OBESITY WITH SERIOUS COMORBIDITY AND BODY MASS INDEX (BMI) OF 36.0 TO 36.9 IN ADULT, UNSPECIFIED OBESITY TYPE (H): ICD-10-CM

## 2025-04-15 DIAGNOSIS — E66.812 CLASS 2 SEVERE OBESITY WITH SERIOUS COMORBIDITY AND BODY MASS INDEX (BMI) OF 36.0 TO 36.9 IN ADULT, UNSPECIFIED OBESITY TYPE (H): ICD-10-CM

## 2025-04-15 DIAGNOSIS — I10 BENIGN ESSENTIAL HYPERTENSION: Primary | ICD-10-CM

## 2025-04-15 DIAGNOSIS — E11.22 TYPE 2 DIABETES MELLITUS WITH STAGE 3 CHRONIC KIDNEY DISEASE, WITHOUT LONG-TERM CURRENT USE OF INSULIN, UNSPECIFIED WHETHER STAGE 3A OR 3B CKD (H): ICD-10-CM

## 2025-04-15 DIAGNOSIS — N18.30 TYPE 2 DIABETES MELLITUS WITH STAGE 3 CHRONIC KIDNEY DISEASE, WITHOUT LONG-TERM CURRENT USE OF INSULIN, UNSPECIFIED WHETHER STAGE 3A OR 3B CKD (H): ICD-10-CM

## 2025-04-15 DIAGNOSIS — R21 RASH AND NONSPECIFIC SKIN ERUPTION: ICD-10-CM

## 2025-04-15 DIAGNOSIS — Z29.11 NEED FOR VACCINATION AGAINST RESPIRATORY SYNCYTIAL VIRUS: ICD-10-CM

## 2025-04-15 RX ORDER — LEVOTHYROXINE SODIUM 137 UG/1
TABLET ORAL
Qty: 90 TABLET | Refills: 0 | Status: CANCELLED | OUTPATIENT
Start: 2025-04-15

## 2025-04-15 ASSESSMENT — PAIN SCALES - GENERAL: PAINLEVEL_OUTOF10: NO PAIN (0)

## 2025-04-15 NOTE — PROGRESS NOTES
"  Assessment & Plan     Benign essential hypertension  Now well-controlled.  Low salt, low fat diet.   Exercise as tolerated.  Take meds as prescribed; call if with side effects.      Rash and nonspecific skin eruption  Advised patient to try otc hydrocortisone twice a day for 7 days max.  If lesions increase in size or have other changes, follow up in clinic.  Not typical of eczema but it can still be that.    Need for vaccination against respiratory syncytial virus  Patient will get this with flu shot in the fall.     Type 2 diabetes mellitus with stage 3 chronic kidney disease, without long-term current use of insulin, unspecified whether stage 3a or 3b CKD (H)  Stable renal function on recent labs.  Maintain hydration. Avoid nephrotoxins.     Class 2 severe obesity with serious comorbidity and body mass index (BMI) of 36.0 to 36.9 in adult, unspecified obesity type (H)  Increases complexity of management of the above chronic conditions.  Patient was advised healthy diet recommendations.  Patient was advised weekly exercise recommendations and goals.       The longitudinal plan of care for the diagnosis(es)/condition(s) as documented were addressed during this visit. Due to the added complexity in care, I will continue to support Don in the subsequent management and with ongoing continuity of care.        BMI  Estimated body mass index is 36.9 kg/m  as calculated from the following:    Height as of this encounter: 1.784 m (5' 10.25\").    Weight as of this encounter: 117.5 kg (259 lb).   Weight management plan: Discussed healthy diet and exercise guidelines      Patient Instructions   Blood pressure now in good control.  Continue current medications.    Be consistent with low salt, low trans fat and low saturated fat diet.  Eat food rich in omega-3-fatty acids as you tolerate. (salmon, olive oil)  Eat 5 cups of vegetables, fruits and whole grains per day.  Limit starchy food (white rice, white bread, white pasta, " white potatoes) to less than a cup per meal.  Minimize sweets, junk food and fastfood. Limit soda beverages to one serving per day; best to avoid it altogether though.    Exercise: moderate intensity sustained for at least 30 mins per episode, goal of 150 mins per week at least  Combine cardiovascular and resistance exercises.  These exercise recommendations are in addition to your daily activity at work or home.  Work on losing weight.    Measure resting blood pressure at home at least once a day, and as needed if you have dizziness or other symptoms.  You may log measurements in a small notebook.    Goal blood pressure is to be below 140/90 consistently    Contact the care team if your blood pressures are frequently out of range or if you have any concerning symptoms.     You may have lab tests completed prior to your next wellness exam in June 2025.    Caroline Salvador is a 79 year old, presenting for the following health issues:  Hypertension and Thyroid Problem        4/15/2025    10:40 AM   Additional Questions   Roomed by Tyra ESTRADA MA   Accompanied by self         4/15/2025    10:40 AM   Patient Reported Additional Medications   Patient reports taking the following new medications none     History of Present Illness       Hypertension: He presents for follow up of hypertension.  He does check blood pressure  regularly outside of the clinic. Outside blood pressures have been over 140/90. He follows a low salt diet.     He eats 2-3 servings of fruits and vegetables daily.He consumes 1 sweetened beverage(s) daily.He exercises with enough effort to increase his heart rate 20 to 29 minutes per day.  He exercises with enough effort to increase his heart rate 5 days per week.   He is taking medications regularly.      Patient brings in his home BP readings. Mostly below 130/90 since starting amlodipine 2.5 mg daily.  Patient denies increased edema with amlodipine to date.  Denies chest pain, dyspnea, HA, BOV,  "dizziness or urinary changes.     Hypothyroidism Follow-up    Since last visit, patient describes the following symptoms: dry skin and hair loss  Has had refill of med 3/14/2025.  Due for labs in June before his next refill.    Patient reports non-pruritic, non- scaly rashes on right elbow and left proximal forearm for the last several weeks.  No known insect bites, new body products or other exposures.  No treatment to date.    Review of Systems  Constitutional, HEENT, cardiovascular, pulmonary, GI, , musculoskeletal, neuro, skin, endocrine and psych systems are negative, except as otherwise noted.      Objective    /52 (BP Location: Right arm, Patient Position: Sitting, Cuff Size: Adult Large)   Pulse 59   Temp 97.3  F (36.3  C) (Tympanic)   Resp 20   Ht 1.784 m (5' 10.25\")   Wt 117.5 kg (259 lb)   SpO2 98%   BMI 36.90 kg/m    Body mass index is 36.9 kg/m .  Physical Exam   GENERAL: obese,  alert and no distress, ambulatory w/o assist  RESP: lungs clear to auscultation - no rales, no rhonchi, no wheezes  CV: regular rates and rhythm, no murmur  MS: bilateral lymphedema - compression stockings in place.  SKIN: oval shaped lightly erythematous non-scaly 3 cm patch on right antecubital area; similar appearing but larger patch on proximal left forearm extensor aspect       No results found for any visits on 04/15/25.        Signed Electronically by: Gilberto Alonzo MD    "

## 2025-04-15 NOTE — PATIENT INSTRUCTIONS
Blood pressure now in good control.  Continue current medications.    Be consistent with low salt, low trans fat and low saturated fat diet.  Eat food rich in omega-3-fatty acids as you tolerate. (salmon, olive oil)  Eat 5 cups of vegetables, fruits and whole grains per day.  Limit starchy food (white rice, white bread, white pasta, white potatoes) to less than a cup per meal.  Minimize sweets, junk food and fastfood. Limit soda beverages to one serving per day; best to avoid it altogether though.    Exercise: moderate intensity sustained for at least 30 mins per episode, goal of 150 mins per week at least  Combine cardiovascular and resistance exercises.  These exercise recommendations are in addition to your daily activity at work or home.  Work on losing weight.    Measure resting blood pressure at home at least once a day, and as needed if you have dizziness or other symptoms.  You may log measurements in a small notebook.    Goal blood pressure is to be below 140/90 consistently    Contact the care team if your blood pressures are frequently out of range or if you have any concerning symptoms.     You may have lab tests completed prior to your next wellness exam in June 2025.

## 2025-05-20 ENCOUNTER — TRANSFERRED RECORDS (OUTPATIENT)
Dept: MULTI SPECIALTY CLINIC | Facility: CLINIC | Age: 80
End: 2025-05-20

## 2025-05-20 LAB — RETINOPATHY: NORMAL

## 2025-06-14 ENCOUNTER — MYC REFILL (OUTPATIENT)
Dept: FAMILY MEDICINE | Facility: CLINIC | Age: 80
End: 2025-06-14
Payer: COMMERCIAL

## 2025-06-14 DIAGNOSIS — E03.9 HYPOTHYROIDISM, UNSPECIFIED TYPE: ICD-10-CM

## 2025-06-15 ENCOUNTER — MYC REFILL (OUTPATIENT)
Dept: FAMILY MEDICINE | Facility: CLINIC | Age: 80
End: 2025-06-15
Payer: COMMERCIAL

## 2025-06-15 DIAGNOSIS — I10 UNCONTROLLED HYPERTENSION: ICD-10-CM

## 2025-06-16 RX ORDER — LEVOTHYROXINE SODIUM 137 UG/1
TABLET ORAL
Qty: 90 TABLET | Refills: 1 | Status: SHIPPED | OUTPATIENT
Start: 2025-06-16

## 2025-06-16 NOTE — TELEPHONE ENCOUNTER
GFR Estimate   Date Value Ref Range Status   02/18/2025 49 (L) >60 mL/min/1.73m2 Final     Comment:     eGFR calculated using 2021 CKD-EPI equation.   11/06/2018 47 (L) >60 mL/min/1.7m2 Final     Comment:     Non  GFR Calc     Potassium   Date Value Ref Range Status   02/18/2025 4.4 3.4 - 5.3 mmol/L Final   04/18/2022 4.6 3.4 - 5.3 mmol/L Final   11/06/2018 4.5 3.4 - 5.3 mmol/L Final     Last office visit: 4/15/2025 ; last virtual visit: Visit date not found with prescribing provider:     Future Office Visit:

## 2025-06-17 RX ORDER — LISINOPRIL 40 MG/1
40 TABLET ORAL DAILY
Qty: 90 TABLET | Refills: 3 | Status: SHIPPED | OUTPATIENT
Start: 2025-06-17

## 2025-06-19 ENCOUNTER — LAB (OUTPATIENT)
Dept: LAB | Facility: CLINIC | Age: 80
End: 2025-06-19
Payer: COMMERCIAL

## 2025-06-19 ENCOUNTER — RESULTS FOLLOW-UP (OUTPATIENT)
Dept: FAMILY MEDICINE | Facility: CLINIC | Age: 80
End: 2025-06-19

## 2025-06-19 DIAGNOSIS — N18.30 TYPE 2 DIABETES MELLITUS WITH STAGE 3 CHRONIC KIDNEY DISEASE, WITHOUT LONG-TERM CURRENT USE OF INSULIN, UNSPECIFIED WHETHER STAGE 3A OR 3B CKD (H): ICD-10-CM

## 2025-06-19 DIAGNOSIS — E11.22 TYPE 2 DIABETES MELLITUS WITH STAGE 3 CHRONIC KIDNEY DISEASE, WITHOUT LONG-TERM CURRENT USE OF INSULIN, UNSPECIFIED WHETHER STAGE 3A OR 3B CKD (H): ICD-10-CM

## 2025-06-19 DIAGNOSIS — R80.9 MICROALBUMINURIA DUE TO TYPE 2 DIABETES MELLITUS (H): ICD-10-CM

## 2025-06-19 DIAGNOSIS — E11.29 MICROALBUMINURIA DUE TO TYPE 2 DIABETES MELLITUS (H): ICD-10-CM

## 2025-06-19 DIAGNOSIS — I10 PRIMARY HYPERTENSION: ICD-10-CM

## 2025-06-19 LAB
ALBUMIN SERPL BCG-MCNC: 4.3 G/DL (ref 3.5–5.2)
ANION GAP SERPL CALCULATED.3IONS-SCNC: 11 MMOL/L (ref 7–15)
BUN SERPL-MCNC: 39.6 MG/DL (ref 8–23)
CALCIUM SERPL-MCNC: 10.1 MG/DL (ref 8.8–10.4)
CHLORIDE SERPL-SCNC: 101 MMOL/L (ref 98–107)
CREAT SERPL-MCNC: 1.59 MG/DL (ref 0.67–1.17)
CREAT UR-MCNC: 80.8 MG/DL
EGFRCR SERPLBLD CKD-EPI 2021: 44 ML/MIN/1.73M2
EST. AVERAGE GLUCOSE BLD GHB EST-MCNC: 157 MG/DL
GLUCOSE SERPL-MCNC: 138 MG/DL (ref 70–99)
HBA1C MFR BLD: 7.1 % (ref 0–5.6)
HCO3 SERPL-SCNC: 23 MMOL/L (ref 22–29)
HGB BLD-MCNC: 14 G/DL (ref 13.3–17.7)
MCV RBC AUTO: 91 FL (ref 78–100)
MICROALBUMIN UR-MCNC: 50.7 MG/L
MICROALBUMIN/CREAT UR: 62.75 MG/G CR (ref 0–17)
PHOSPHATE SERPL-MCNC: 3.1 MG/DL (ref 2.5–4.5)
POTASSIUM SERPL-SCNC: 4.6 MMOL/L (ref 3.4–5.3)
PTH-INTACT SERPL-MCNC: 54 PG/ML (ref 15–65)
SODIUM SERPL-SCNC: 135 MMOL/L (ref 135–145)
VIT D+METAB SERPL-MCNC: 43 NG/ML (ref 20–50)

## 2025-06-23 NOTE — PROGRESS NOTES
Nephrology Clinic Visit  Anil Gutierres Jr. MRN: [id number] YOB: 1945  Primary Care Provider: Gilberto Alonzo  ----------------------------------------------------------------------------------------------------------------------  Visit   -BP Control:   --In office: 161/73 today in office.   --At home: Highest he's gotten at home in the last 2 weeks was 139 or 141/83, a typical reading systolic would be in the 130's or 120's. Lowest was 121.  --Current Regimen: Atenolol 12.5mg qDay, Chlorthalidone 12.5mg qDay, Lisinopril 40mg qDay, Amlodipine 2.5mg qDay  -DM Control: 6.7 (12/5/2024)  --Current Regimen: None  -Creatinine Trend: 1.59 (6/19/2025) from 1.52 (1/30/2025) from 1.59 (8/6/2024)  -He will very rarely take Ibuprofen.     Visit 2/4/2025:  --BP Control: Diagnosed about 20 years or so.   --In office: 193/102 today in office.  --At home: Nothing much lower than 140-150 range at home. Can get as high as 190 per his report.  --Current Regimen: Atenolol 12.5mg qDay, hydrochlorothiazide 12.5mg qDay, Lisinopril 40mg qDay  -DM Control: 6.7 (12/5/2024)  --Current Regimen: None  -Creatinine Trend: 1.52 (1/30/2025) from 1.59 (8/6/2024)  -Nocturia: 1x most nights.   -Hematuria: No  -Nephrolithiasis: No  -NSAID Use: Rare ibuprofen use. Discussed voltaren  -Herbal/OTC Medication Use: No  -Family History of Kidney Disease: Not that he knows of  -Family/Friends on RRT/Kidney Transplant: No/No  -Social History: [place], Lives with wife, House, [place] is [place] 13 miles, Alcohol: ~7 drinks, Tobacco use: quit in 1984 (smoker since 1964). [employer], works on 3 acres of land, [place] [employer] [place]    Other:  -Chronic lymphedema. Has followed in lymphedema clinic in the past. Does not have compression stockings on.  -We discussed sodium restriction.     Objective:  PAST MEDICAL HISTORY:  Past Medical History:   Diagnosis Date    Arthritis 2018    L knee, R hip    Basal cell carcinoma      Coronary artery disease involving native heart without angina pectoris, unspecified vessel or lesion type 6/27/2022    Diabetes (H) 2000    Type 2    Hypertension 2000    Thyroid disease 2000    Hypothyroidism       PAST SURGICAL HISTORY:  Past Surgical History:   Procedure Laterality Date    BIOPSY  2021    Tongue    COLONOSCOPY  2017    COLONOSCOPY N/A 11/7/2022    Procedure: COLONOSCOPY;  Surgeon: Greer White MD;  Location: [facility]    EYE SURGERY  2019    cataracts    GLOSSECTOMY PARTIAL Right 12/2/2021    Procedure: GLOSSECTOMY, PARTIAL;  Surgeon: Rojelio Horton MD;  Location: [facility]    ORTHOPEDIC SURGERY  dec  23, 2020    R Hip       MEDICATIONS:  Current Outpatient Medications   Medication Instructions    aspirin (ASA) 81 mg, Oral, DAILY    atenolol (TENORMIN) 12.5 mg, Oral, DAILY    cetirizine (ZYRTEC) 10 mg, Oral, EVERY EVENING    furosemide (LASIX) 20 mg, Oral, PRN    Glucose Blood (BLOOD GLUCOSE TEST STRIPS) STRP In Vitro, DAILY    hydroCHLOROthiazide 12.5 mg, Oral, DAILY    levothyroxine (SYNTHROID/LEVOTHROID) 137 MCG tablet TAKE 1 TABLET BY MOUTH DAILY.  WELLNESS EXAM    lisinopril (ZESTRIL) 40 mg, Oral, DAILY, SCHEDULE NURSE ONLY VISIT WITHIN NEXT 30 DAYS TO RECHECK BLOOD PRESSURE!    loratadine (CLARITIN) 10 mg, Oral       FAMILY MEDICAL HISTORY:   Family History   Problem Relation Age of Onset    Arthritis Mother     Cerebrovascular Disease Father     Diabetes Sister        PHYSICAL EXAM:   BP (!) 161/73   Pulse 57   Wt 118 kg (260 lb 3.2 oz)   SpO2 99%   BMI 37.07 kg/m    GENERAL APPEARANCE: alert and no distress  EYES: nonicteric  HENT: mouth without ulcers or lesions  RESP: lungs clear to auscultation   Extremities: B/L Chronic LE Edema (hx of lymphedema, also has 1+ b/l pitting edema)  MS: no evidence of inflammation in joints, no muscle tenderness  SKIN: no rash  NEURO: mentation intact and speech normal  PSYCH: affect normal    LABS REVIEWED BY ME:   ANEMIA  Recent Labs   Lab Test  "06/19/25  1021 01/30/25  1025 08/06/24  1106 11/13/23  1445   HGB 14.0 14.8 14.3 14.3       BMP  Recent Labs   Lab Test 06/19/25  1021 02/18/25  0948 01/30/25  1025 08/06/24  1106 07/19/24  1037    136 136  --  137   POTASSIUM 4.6 4.4 5.0  --  5.1   CHLORIDE 101 98 101  --  101   CO2 23 25 26  --  25   ANIONGAP 11 13 9  --  11   BUN 39.6* 32.2* 31.3*  --  47.7*   CR 1.59* 1.47* 1.52* 1.59* 1.66*   GFRESTIMATED 44* 49* 47* 44* 42*       CBC  Recent Labs   Lab Test 06/19/25  1021 01/30/25  1025 08/06/24  1106 11/13/23  1445 12/08/22  0856 11/25/22  0837 11/06/18  1606   HGB 14.0 14.8 14.3 14.3   < > 14.3 14.4   WBC  --  6.6  --   --   --  6.1 8.8   HCT  --  44.8  --   --   --  44.0 43.3   MCV 91 91  --   --   --  90 93   PLT  --  215  --   --   --  226 232    < > = values in this interval not displayed.       DIABETES  Recent Labs   Lab Test 06/19/25  1021 12/05/24  1121 06/20/24  1421 11/13/23  1445   A1C 7.1* 6.7* 6.5* 6.1*       HYPONATREMIANo lab results found.    Invalid input(s): \"UOSM\", \"OSM\"    MBD  Recent Labs   Lab Test 06/19/25  1021 02/18/25  0948 01/30/25  1025 07/19/24  1037   MILKA 10.1 10.2 9.7 9.9   ALBUMIN 4.3  --  4.3  --    PHOS 3.1  --  3.0  --    PTHI 54  --  80*  --         URINE STUDIES  Recent Labs   Lab Test 01/30/25  1025 08/06/24  1106 01/05/23  1030 12/08/22  0856   COLOR Yellow Yellow Yellow Yellow   APPEARANCE Clear Clear Clear Clear   URINEGLC Negative Negative Negative Negative   URINEBILI Negative Negative Negative Negative   URINEKETONE Negative Negative Negative Negative   SG 1.020 1.020 1.025 >=1.030   UBLD Negative Negative Negative Trace*   URINEPH 6.5 5.5 5.5 5.5   PROTEIN Negative Trace* Negative Trace*   UROBILINOGEN 0.2 0.2 0.2 0.2   NITRITE Negative Negative Negative Negative   LEUKEST Negative Negative Negative Negative   RBCU None Seen 0-2  --  2-5*   WBCU None Seen 0-5  --  0-5     No lab results found.    ADDITIONAL LABS ORDERED/REVIEWED BY ME:  See " below    Assessment/Plan  CKD Stage G3aA2  Established care with [facility] 2/4/2025    79 year old male w/ Hx notable for Hypertension, DMT2, Lymphedema, Obesity    Stable CKD dating back to at least 11/6/2018 (creatinine 1.46 at that time). Creatinine 1.52 at time of establishing care with me.    UA bland 1/30/2025  UPCR 0.35g/gr 1/30/2025  UACR 122mg/gr 8/6/2024    No recent renal imaging.    CKD Etiology (Biopsy Proven: No):  -Hypertensive nephrosclerosis  -DKD    Plan:  -Optimize Bp Control:  --Continue Chlorthalidone 12.5mg qDay  --Continue Lisinopril 40mg qDay  --Continue Atenolol 12.5mg qDay  --Continue Amlodipine 2.5mg qDay  --A1c < 7. Discussed diet and lifestyle interventions  --Discussed potential addition of SGLT2i in the future if A1c > 7 or if eGFR < 45 or if UACR > 200mg/gr  -Discussed avoidance of all PO NSAIDs. He uses Ibuprofen for chronic knee pain (relatively rare use). Advised tylenol and topical diclofenac gel  -Discussed low Na diet. Given NKF handout  -Remainder per problem below      Anemia of Renal Disease  Hemoglobin   Date Value Ref Range Status   06/19/2025 14.0 13.3 - 17.7 g/dL Final   11/06/2018 14.4 13.3 - 17.7 g/dL Final       Plan:  -No need for IV iron or SAUNDRA      Lipid Management in CKD  KDIGO 2013 Guidelines recommend the following for patients with CKD:  Adults >/= 51 yo w/ CKD stage 1 or 2: Statin  Adults >/= 51 yo w/ CKD stage 3-5: Statin + Ezetimibe or Statin alone  Adults 18-49 + 1 of the following (CAD, DM, Ischemic stroke, 10 yr ASCVD risk >10%): Statin    KDIGO guidelines recommend Simvastatin 20mg/Ezetimibe 10mg qDay for patients with CKD G3a-G5 (in line with the SHARP trial). If Simvastatin used alone, 40mg qDay is referenced.   Other options include: Atorvastatin 20mg qDay (4D trial) and Rosuvastatin 10mg qDay (ELIZABETH trial)    Plan:  -History of myalgias. This is unclear if from statin or related to chronic muscle aches. Will start very low dose Atorvastaitn (10mg  qDay) and monitor closely for wrosening joint symptoms. He knows to sto pat the first sign of worsening symptoms and update me. Sent in 30 day uspply with no reiflls. He will update me near the jonathan of his prescription and we will decide if we should continue      Metabolic Acidosis of Renal Disease  Bicarb: 23 (6/19/2025)    Plan:  -No need for NaHCO at this time      Mineral Bone Disease  KDIGO 2017 Guidelines recommend the following for patients with CKD N2s-P7B:  -Treatments of CKD MBD should be based on serial assessments of phosphate, calcium, and PTH levels, considered together (Not Graded).  -Lowering elevated phosphate levels toward the normal range (2C) and guiding decisions about phosphate-lowering treatment should be based on progressively or persistently elevated serum phosphate (Not Graded). Limiting dietary phosphate intake in the treatment of hyperphosphatemiaalone or in combination with other treatments (2D). It is reasonable to consider phosphate source (e.g., animal, vegetable, additives) in making dietary recommendations (Not Graded).  -Avoiding hypercalcemia (2C)  -In patients with CKD G3a-G5 not on dialysis, the optimal PTH level is not known. However, we suggest that patients with levels of intact PTH progressively rising or persistently above the upper normal limit for the assay be evaluated for modifiable factors, including hyperphosphatemia, hypocalcemia, high phosphate intake, and vitamin D deficiency (2C).  -In adult patients with CKD G3a-G5 not on dialysis, we suggest that calcitriol and vitamin D analogs not be routinely used (2C). It is reasonable to reserve the use of calcitriol and vitamin D analogs for patients with CKD G4-G5 with severe and progressive hyperparathyroidism (Not Graded).    Parathyroid Hormone Intact   Date Value Ref Range Status   06/19/2025 54 15 - 65 pg/mL Final   01/30/2025 80 (H) 15 - 65 pg/mL Final     Phosphorus   Date Value Ref Range Status   06/19/2025 3.1  2.5 - 4.5 mg/dL Final   01/30/2025 3.0 2.5 - 4.5 mg/dL Final     Calcium   Date Value Ref Range Status   06/19/2025 10.1 8.8 - 10.4 mg/dL Final   02/18/2025 10.2 8.8 - 10.4 mg/dL Final   11/06/2018 9.2 8.5 - 10.1 mg/dL Final     Vitamin D, Total (25-Hydroxy)   Date Value Ref Range Status   06/19/2025 43 20 - 50 ng/mL Final     Comment:     optimum levels   01/30/2025 12 (L) 20 - 50 ng/mL Final     Comment:     mild to moderate deficiency       Plan:  -Reduce Vitamin D from 2000 international unit(s) to 1000 international unit(s) qDay. Monitor trends    Other:  -Specialty Care Coordination Referral - CKD G1-G3b w/o imminent RRT planning/needs (Yes/no, Date Referral Placed): No  -Med Therapy Management Referral (Yes/No, Date of Referral): No    Return to clinic: 4 months with Miladis and 8 mnonths with Dr Dorene Loya MD   of Medicine  Division of Nephrology and Hypertension  [facility]    25 minutes spent on the date of the encounter doing chart review, history and exam, documentation and further activities as noted above    The longitudinal plan of care for Hypertension, DM, CKD G3aA2 was addressed during this visit. Due to the added complexity in care, I will continue to support Anil Gutierres Jr. in the subsequent management of this condition(s) and with the ongoing continuity of care of this condition(s).

## 2025-06-24 ENCOUNTER — OFFICE VISIT (OUTPATIENT)
Dept: NEPHROLOGY | Facility: CLINIC | Age: 80
End: 2025-06-24
Payer: COMMERCIAL

## 2025-06-24 VITALS
BODY MASS INDEX: 37.07 KG/M2 | WEIGHT: 260.2 LBS | HEART RATE: 57 BPM | OXYGEN SATURATION: 99 % | DIASTOLIC BLOOD PRESSURE: 73 MMHG | SYSTOLIC BLOOD PRESSURE: 161 MMHG

## 2025-06-24 DIAGNOSIS — N18.31 STAGE 3A CHRONIC KIDNEY DISEASE (H): Primary | ICD-10-CM

## 2025-06-24 DIAGNOSIS — I10 PRIMARY HYPERTENSION: ICD-10-CM

## 2025-06-24 RX ORDER — CHLORTHALIDONE 25 MG/1
12.5 TABLET ORAL EVERY OTHER DAY
Qty: 23 TABLET | Refills: 3 | Status: SHIPPED | OUTPATIENT
Start: 2025-06-24 | End: 2026-06-19

## 2025-06-24 RX ORDER — ATORVASTATIN CALCIUM 10 MG/1
10 TABLET, FILM COATED ORAL DAILY
Qty: 30 TABLET | Refills: 0 | Status: SHIPPED | OUTPATIENT
Start: 2025-06-24 | End: 2025-07-24

## 2025-06-24 NOTE — PATIENT INSTRUCTIONS
"It was a pleasure to see you in nephrology clinic today. I've included a brief summary of our discussion and care plan from today's visit below.  _______________________________________________________________________    My recommendations are summarized as follows:  -Keep a Blood Pressure log. Please make sure that you are using a validated blood pressure device (check \"www.validatebp.org\").  -Avoid all NSAID's. Examples include Ibuprofen (Advil, Motrin), naprosyn (Aleve), celebrex among others. Acetaminophen (Tylenol) is ok with maximum dose in 24 hours of 3000mg.  -Healthy lifestyle measures will keep your kidney's functioning at their current best. This includes regular exercise, maintaining a healthy body weight and smoking cessation.   -We will discuss Jardiance/Farxiga at next appointment. This is a very kidney protective medication for folks with a kidney function < 45% or a urine albumin > 200mg/gr.   -I want you getting 1000 international unit(s) per day of vitamin D from supplements. You can take every other day or get a new over the counter supplement.   -We are starting Atorvastatin 10mg 1x per day. Watch for muscle aches or pain especially in the hips and shoulders. Stop medicine at first sign of this and let me know. Update me near the end of your 30 day prescription as well if you plan to continue the medication as I will need to send in a refill.    Please return to Nephrology Clinic in 4 months with Miladis, 8 months with Dr Loya to review your progress.     Who do I call with any questions after my visit?  There are multiple ways to contact your nephrology care team:    -To schedule or reschedule an appointment, please call [phone].  -To contact my nurse Genevieve, please call [phone]  -Reach out via [employer]. These messages are answered by your nurse or doctor during business hours and typically in 1-2 days. [employer] messages are best for quick questions/clarifications/updates. Frequently, " your doctor or nurse will recommend setting up a follow up appointment to address any significant questions/concerns.  -For urgent questions after business hours, you may reach the on-call Nephrology Fellow by contacting the [facility]  at [phone].    To schedule imaging:   -Please call [phone]     To schedule your lab appointment at [facility]  -Please call [phone]    Sincerely,    Dr. Michael Loya   of Medicine  Division of Nephrology and Hypertension  [facility]

## 2025-06-25 SDOH — HEALTH STABILITY: PHYSICAL HEALTH: ON AVERAGE, HOW MANY MINUTES DO YOU ENGAGE IN EXERCISE AT THIS LEVEL?: 30 MIN

## 2025-06-25 SDOH — HEALTH STABILITY: PHYSICAL HEALTH: ON AVERAGE, HOW MANY DAYS PER WEEK DO YOU ENGAGE IN MODERATE TO STRENUOUS EXERCISE (LIKE A BRISK WALK)?: 3 DAYS

## 2025-06-25 ASSESSMENT — SOCIAL DETERMINANTS OF HEALTH (SDOH): HOW OFTEN DO YOU GET TOGETHER WITH FRIENDS OR RELATIVES?: PATIENT DECLINED

## 2025-06-26 ENCOUNTER — MYC REFILL (OUTPATIENT)
Dept: FAMILY MEDICINE | Facility: CLINIC | Age: 80
End: 2025-06-26
Payer: COMMERCIAL

## 2025-06-26 DIAGNOSIS — I10 UNCONTROLLED HYPERTENSION: ICD-10-CM

## 2025-06-26 RX ORDER — AMLODIPINE BESYLATE 2.5 MG/1
2.5 TABLET ORAL DAILY
Qty: 90 TABLET | Refills: 3 | OUTPATIENT
Start: 2025-06-26

## 2025-06-30 ENCOUNTER — OFFICE VISIT (OUTPATIENT)
Dept: FAMILY MEDICINE | Facility: CLINIC | Age: 80
End: 2025-06-30
Attending: FAMILY MEDICINE
Payer: COMMERCIAL

## 2025-06-30 VITALS
RESPIRATION RATE: 20 BRPM | HEIGHT: 70 IN | SYSTOLIC BLOOD PRESSURE: 118 MMHG | HEART RATE: 54 BPM | DIASTOLIC BLOOD PRESSURE: 64 MMHG | TEMPERATURE: 97.5 F | OXYGEN SATURATION: 93 % | WEIGHT: 258.6 LBS | BODY MASS INDEX: 37.02 KG/M2

## 2025-06-30 DIAGNOSIS — R01.1 UNDIAGNOSED CARDIAC MURMURS: ICD-10-CM

## 2025-06-30 DIAGNOSIS — E11.22 TYPE 2 DIABETES MELLITUS WITH STAGE 3B CHRONIC KIDNEY DISEASE, WITHOUT LONG-TERM CURRENT USE OF INSULIN (H): ICD-10-CM

## 2025-06-30 DIAGNOSIS — I10 BENIGN ESSENTIAL HYPERTENSION: ICD-10-CM

## 2025-06-30 DIAGNOSIS — I89.0 LYMPHEDEMA: ICD-10-CM

## 2025-06-30 DIAGNOSIS — I83.893 VARICOSE VEINS OF BOTH LEGS WITH EDEMA: ICD-10-CM

## 2025-06-30 DIAGNOSIS — Z23 NEED FOR VACCINATION: ICD-10-CM

## 2025-06-30 DIAGNOSIS — N18.32 TYPE 2 DIABETES MELLITUS WITH STAGE 3B CHRONIC KIDNEY DISEASE, WITHOUT LONG-TERM CURRENT USE OF INSULIN (H): ICD-10-CM

## 2025-06-30 DIAGNOSIS — Z00.00 ENCOUNTER FOR MEDICARE ANNUAL WELLNESS EXAM: Primary | ICD-10-CM

## 2025-06-30 PROCEDURE — 99207 PR FOOT EXAM NO CHARGE: CPT | Performed by: FAMILY MEDICINE

## 2025-06-30 PROCEDURE — 1126F AMNT PAIN NOTED NONE PRSNT: CPT | Performed by: FAMILY MEDICINE

## 2025-06-30 PROCEDURE — G2211 COMPLEX E/M VISIT ADD ON: HCPCS | Performed by: FAMILY MEDICINE

## 2025-06-30 PROCEDURE — 3074F SYST BP LT 130 MM HG: CPT | Performed by: FAMILY MEDICINE

## 2025-06-30 PROCEDURE — 99214 OFFICE O/P EST MOD 30 MIN: CPT | Mod: 25 | Performed by: FAMILY MEDICINE

## 2025-06-30 PROCEDURE — 3078F DIAST BP <80 MM HG: CPT | Performed by: FAMILY MEDICINE

## 2025-06-30 PROCEDURE — G0439 PPPS, SUBSEQ VISIT: HCPCS | Performed by: FAMILY MEDICINE

## 2025-06-30 ASSESSMENT — PAIN SCALES - GENERAL: PAINLEVEL_OUTOF10: NO PAIN (0)

## 2025-06-30 NOTE — PROGRESS NOTES
Preventive Care Visit  [facility]  Gilberto Alonzo MD, Family Medicine  Jun 30, 2025      Assessment & Plan     Encounter for Medicare annual wellness exam  Patient was advised on recommended screening and preventive health recommendations.  He verbalized understanding and agreed to the plans below.     Lymphedema  Assess venous blood flow. Patient agreed to US.  Consider referring back to lymphedema clinic for alternative ways to do compression as his stockings may be a bit abrasive causing skin to blister.  Return precautions discussed and given to patient.   - US Venous Competency Bilateral  - OFFICE/OUTPT VISIT,ESTLEVL IV    Varicose veins of both legs with edema  See above.  Consider vein clinic consult if significant insufficiency.  Return precautions discussed and given to patient.   - US Venous Competency Bilateral  - OFFICE/OUTPT VISIT,EST,LEVL IV    Type 2 diabetes mellitus with stage 3b chronic kidney disease, without long-term current use of insulin (H)  Keep current treatment. A1c <7.5% may be appropriate goal for patient's age.  Repeat A1c 3 months for closer surveillance.  Consider SGLT2i if rising A1c. He has CKD as well so this med is also appropriate for that.  - Hemoglobin A1c  - FOOT EXAM    Benign essential hypertension  Controlled.  Low salt, low fat diet.   Exercise as tolerated.  Take meds as prescribed; call if with side effects.      Need for vaccination    Undiagnosed cardiac murmurs  No previous echo  Advised patient risk if with progressive or significant valve disease.  Patient agreed to pursue echo.  Return precautions discussed and given to patient.  Refer to cardiology if echo findings with concern.  - Echocardiogram Complete  - OFFICE/OUTPT VISIT,ESTJORDANL IV      Patient has been advised of split billing requirements and indicates understanding: Yes      Reviewed preventive health counseling, as reflected in patient  instructions  Counseling  Appropriate preventive services were addressed with this patient via screening, questionnaire, or discussion as appropriate for fall prevention, nutrition, physical activity, Tobacco-use cessation, social engagement, weight loss and cognition.  Checklist reviewing preventive services available has been given to the patient.  Reviewed patient's diet, addressing concerns and/or questions.   He is at risk for lack of exercise and has been provided with information to increase physical activity for the benefit of his well-being.   The patient was instructed to see the dentist every 6 months.   Patient reported safety concerns were addressed today.The patient was provided with written information regarding signs of hearing loss.     The longitudinal plan of care for the diagnosis(es)/condition(s) as documented were addressed during this visit. Due to the added complexity in care, I will continue to support Mod[place] in the subsequent management and with ongoing continuity of care.     Follow-up   Return in about 3 months (around 9/30/2025) for Laboratory apppointment.     Follow-up Visit   Expected date:  Jul 07, 2026 (Approximate)      Follow Up Appointment Details:     Follow-up with whom?: PCP    Follow-Up for what?: Medicare Wellness    Welcome or Annual?: Annual Wellness    How?: In Person                 Subjective   [place] is a 79 year old, presenting for the following:  Physical, Hypertension, Lipids, Thyroid Problem, and Leg Swelling (Right leg, edema )        6/30/2025    11:17 AM   Additional Questions   Roomed by Tyra ESTRADA MA   Accompanied by self         6/30/2025    11:17 AM   Patient Reported Additional Medications   Patient reports taking the following new medications none        Via the Health Maintenance questionnaire, the patient has reported the following services have been completed -Eye Exam: [facility] - DR. Romero 2025-05-20, this information has been sent  to the abstraction team.    HPI       Hyperlipidemia Follow-Up    Are you regularly taking any medication or supplement to lower your cholesterol?   No  Are you having muscle aches or other side effects that you think could be caused by your cholesterol lowering medication?  No    Hypertension Follow-up    Do you check your blood pressure regularly outside of the clinic? Yes   Are you following a low salt diet? No  Are your blood pressures ever more than 140 on the top number (systolic) OR more   than 90 on the bottom number (diastolic), for example 140/90? No    BP Readings from Last 2 Encounters:   06/30/25 118/64   06/24/25 (!) 161/73     Hypothyroidism Follow-up    Since last visit, patient describes the following symptoms: tremors    Chronic Kidney Disease Follow-up    Do you take any over the counter pain medicine?: Yes  What over the counter medicine are you taking for your pain?:  usually APAP; rare Ibuprofen if bad pain    How often do you take this medicine?:  A few times a week  Follows with nephrology./  Patient said nephrologist is deciding if they will start him on SGLT2i.    Recent Labs   Lab Test 06/19/25  1021 02/18/25  0948 01/30/25  1025 12/05/24  1121 06/28/24  1135 06/20/24  1421 11/13/23  1445 01/05/23  1033 11/29/21  1116 11/06/18  1606   A1C 7.1*  --   --  6.7*  --  6.5* 6.1*  --    < >  --    LDL  --   --   --  139*  --   --  137* 123*   < >  --    HDL  --   --   --  39*  --   --  41 41   < >  --    TRIG  --   --   --  133  --   --  141 132   < >  --    CR 1.59* 1.47*   < >  --    < >  --  1.43*  --    < > 1.46*   GFRESTIMATED 44* 49*   < >  --    < >  --  51*  --    < > 47*   GFRESTBLACK  --   --   --   --   --   --   --   --   --  57*   POTASSIUM 4.6 4.4   < >  --    < >  --  4.6  --    < > 4.5   TSH  --   --   --  2.62  --   --  3.21  --    < >  --     < > = values in this interval not displayed.       Advance Care Planning    Discussed advance care planning with patient; informed AVS has  link to [facility] [employer].        6/25/2025   General Health   How would you rate your overall physical health? (!) FAIR   Feel stress (tense, anxious, or unable to sleep) Not at all         6/25/2025   Nutrition   Diet: Regular (no restrictions)         6/25/2025   Exercise   Days per week of moderate/strenous exercise 3 days   Average minutes spent exercising at this level 30 min         6/25/2025   Social Factors   Frequency of gathering with friends or relatives Patient declined   Worry food won't last until get money to buy more No   Food not last or not have enough money for food? No   Do you have housing? (Housing is defined as stable permanent housing and does not include staying outside in a car, in a tent, in an abandoned building, in an overnight shelter, or couch-surfing.) Yes   Are you worried about losing your housing? No   Lack of transportation? No   Unable to get utilities (heat,electricity)? No         6/30/2025   Fall Risk   Gait Speed Test (Document in seconds) 4.75   Gait Speed Test Interpretation Less than or equal to 5.00 seconds - PASS          6/25/2025   Activities of Daily Living- Home Safety   Needs help with the following daily activites None of the above   Safety concerns in the home No grab bars in the bathroom         6/25/2025   Dental   Dentist two times every year? (!) NO         6/25/2025   Hearing Screening   Hearing concerns? (!) I FEEL THAT PEOPLE ARE MUMBLING OR NOT SPEAKING CLEARLY.    (!) I NEED TO ASK PEOPLE TO SPEAK UP OR REPEAT THEMSELVES.    (!) IT'S HARDER TO UNDERSTAND WOMEN'S VOICES THAN MEN'S VOICES.    (!) IT'S HARD TO FOLLOW A CONVERSATION IN A NOISY RESTAURANT OR CROWDED ROOM.    (!) TROUBLE FOLLOWING DIALOGUE IN THE THEATHER.    (!) TROUBLE UNDERSTANDING SOFT OR WHISPERED SPEECH.    (!) TROUBLE UNDERSTANDING SPEECH ON THE TELEPHONE   Would you like a referral for hearing testing? I already have hearing aids       Multiple values from one day are sorted in  reverse-chronological order         2025   Driving Risk Screening   Patient/family members have concerns about driving No         2025   General Alertness/Fatigue Screening   Have you been more tired than usual lately? No         2025   Urinary Incontinence Screening   Bothered by leaking urine in past 6 months No         Today's PHQ-2 Score:       2025    11:56 AM   PHQ-2 (  [employer])   Q1: Little interest or pleasure in doing things 0   Q2: Feeling down, depressed or hopeless 0   PHQ-2 Score 0    Q1: Little interest or pleasure in doing things Not at all   Q2: Feeling down, depressed or hopeless Not at all   PHQ-2 Score 0       Patient-reported           2025   Substance Use   Alcohol more than 3/day or more than 7/wk No   Do you have a current opioid prescription? No   How severe/bad is pain from 1 to 10? 3/10   Do you use any other substances recreationally? No     Social History     Tobacco Use    Smoking status: Former     Current packs/day: 0.00     Average packs/day: 1 pack/day for 28.0 years (28.0 ttl pk-yrs)     Types: Cigarettes     Start date: 1964     Quit date: 1984     Years since quittin.5    Smokeless tobacco: Never   Vaping Use    Vaping status: Never Used   Substance Use Topics    Alcohol use: Yes     Comment: one beer or mixed drink a day in warmer weather    Drug use: Never       ASCVD Risk   The 10-year ASCVD risk score (Jenelle SHETH, et al., 2019) is: 52.1%    Values used to calculate the score:      Age: 79 years      Sex: Male      Is Non- : No      Diabetic: Yes      Tobacco smoker: No      Systolic Blood Pressure: 118 mmHg      Is BP treated: Yes      HDL Cholesterol: 39 mg/dL      Total Cholesterol: 205 mg/dL            Reviewed and updated as needed this visit by Provider    Allergies  Meds  Problems               Past Medical History:   Diagnosis Date    Arthritis 2018    L knee, R hip    Basal cell carcinoma      Coronary artery disease involving native heart without angina pectoris, unspecified vessel or lesion type 2022    Diabetes (H) 2000    Type 2    Hypertension     Thyroid disease     Hypothyroidism     Past Surgical History:   Procedure Laterality Date    BIOPSY      Tongue    COLONOSCOPY      COLONOSCOPY N/A 2022    Procedure: COLONOSCOPY;  Surgeon: Greer White MD;  Location: [facility]    EYE SURGERY  2019    cataracts    GLOSSECTOMY PARTIAL Right 2021    Procedure: GLOSSECTOMY, PARTIAL;  Surgeon: Rojelio Horton MD;  Location: [facility]    ORTHOPEDIC SURGERY  dec  23, 2020    R Hip     Patient Active Problem List   Diagnosis    Stage 3a chronic kidney disease (H)    Coronary artery disease involving native heart without angina pectoris, unspecified vessel or lesion type    Type 2 diabetes mellitus with stage 3 chronic kidney disease, without long-term current use of insulin, unspecified whether stage 3a or 3b CKD (H)    Uncontrolled hypertension    Hypothyroidism, unspecified type    Morbid obesity (H)    Carotid stenosis, right    Chronic knee pain after total replacement of left knee joint     Past Surgical History:   Procedure Laterality Date    BIOPSY      Tongue    COLONOSCOPY      COLONOSCOPY N/A 2022    Procedure: COLONOSCOPY;  Surgeon: Greer White MD;  Location: [facility]    EYE SURGERY      cataracts    GLOSSECTOMY PARTIAL Right 2021    Procedure: GLOSSECTOMY, PARTIAL;  Surgeon: Rojelio Horton MD;  Location: [facility]    ORTHOPEDIC SURGERY  dec  23, 2020    R Hip       Social History     Tobacco Use    Smoking status: Former     Current packs/day: 0.00     Average packs/day: 1 pack/day for 28.0 years (28.0 ttl pk-yrs)     Types: Cigarettes     Start date: 1964     Quit date: 1984     Years since quittin.5    Smokeless tobacco: Never   Substance Use Topics    Alcohol use: Yes     Comment: one beer or mixed drink a day in warmer weather     Family  History   Problem Relation Age of Onset    Arthritis Mother     Cerebrovascular Disease Father     Diabetes Sister          Current Outpatient Medications   Medication Sig Dispense Refill    amLODIPine (NORVASC) 2.5 MG tablet Take 1 tablet (2.5 mg) by mouth daily. 90 tablet 3    aspirin (ASA) 81 MG EC tablet Take 81 mg by mouth daily      atenolol (TENORMIN) 25 MG tablet Take 0.5 tablets (12.5 mg) by mouth daily. CHECK BLOOD PRESSURE AT HOME OR SCHEDULE NURSE VISIT FOR BP CHECK WITHIN THE NEXT 30 DAYS. 45 tablet 3    cetirizine (ZYRTEC) 10 MG tablet Take 10 mg by mouth every evening      chlorthalidone (HYGROTON) 25 MG tablet Take 0.5 tablets (12.5 mg) by mouth every other day. 23 tablet 3    Glucose Blood (BLOOD GLUCOSE TEST STRIPS) STRP by In Vitro route daily      levothyroxine (SYNTHROID/LEVOTHROID) 137 MCG tablet TAKE 1 TABLET BY MOUTH DAILY.  WELLNESS EXAM 90 tablet 1    lisinopril (ZESTRIL) 40 MG tablet Take 1 tablet (40 mg) by mouth daily. 90 tablet 3    loratadine (CLARITIN) 10 MG tablet Take 10 mg by mouth      atorvastatin (LIPITOR) 10 MG tablet Take 1 tablet (10 mg) by mouth daily. (Patient not taking: Reported on 6/30/2025) 30 tablet 0     Allergies   Allergen Reactions    Amoxicillin GI Disturbance and Nausea     N, V,  D   6/21  when     took   for   dental prophylaxis   N, V,  D   6/21  when     took   for   dental prophylaxis   N, V,  D   6/21  when     took   for   dental prophylaxis     Seasonal Allergies     Simvastatin Muscle Pain (Myalgia)     Current providers sharing in care for this patient include:  Patient Care Team:  Gilberto Alonzo MD as PCP - General (Family Medicine)  Gilberto Alonzo MD as Assigned PCP  Case Alexander MD as MD (Cardiovascular Disease)  Patti Holland MD as MD (Cardiovascular Disease)  Jan Chavira AuD as Audiologist (Audiology)  Michael Loya MD as MD (Nephrology)  Richard Rouse MD as Assigned Musculoskeletal  "Provider  Michael Loya MD as Assigned Nephrology Provider    The following health maintenance items are reviewed in Epic and correct as of today:  Health Maintenance   Topic Date Due    RSV VACCINE (1 - 1-dose 75+ series) Never done    EYE EXAM  05/14/2025    A1C  09/19/2025    COVID-19 VACCINE (9 - 2024-25 season) 10/15/2025    LIPID  12/05/2025    TSH W/FREE T4 REFLEX  12/05/2025    ANNUAL REVIEW OF HM ORDERS  04/15/2026    BMP  06/19/2026    MICROALBUMIN  06/19/2026    HEMOGLOBIN  06/19/2026    MEDICARE ANNUAL WELLNESS VISIT  06/30/2026    DIABETIC FOOT EXAM  06/30/2026    FALL RISK ASSESSMENT  06/30/2026    ADVANCE CARE PLANNING  06/30/2030    DTAP/TDAP/TD VACCINE (3 - Td or Tdap) 01/20/2032    HEPATITIS C SCREENING  Completed    PHQ-2 (once per calendar year)  Completed    INFLUENZA VACCINE  Completed    PNEUMOCOCCAL VACCINE 50+ YEARS  Completed    URINALYSIS  Completed    ZOSTER VACCINE  Completed    HPV VACCINE  Aged Out    MENINGITIS VACCINE  Aged Out    COLORECTAL CANCER SCREENING  Discontinued         Review of Systems  Constitutional, HEENT, cardiovascular, pulmonary, GI, , musculoskeletal, neuro, skin, endocrine and psych systems are negative, except as otherwise noted.     Objective    Exam  /64 (BP Location: Right arm, Patient Position: Sitting, Cuff Size: Adult Large)   Pulse 54   Temp 97.5  F (36.4  C) (Tympanic)   Resp 20   Ht 1.784 m (5' 10.25\")   Wt 117.3 kg (258 lb 9.6 oz)   SpO2 93%   BMI 36.84 kg/m     Estimated body mass index is 36.84 kg/m  as calculated from the following:    Height as of this encounter: 1.784 m (5' 10.25\").    Weight as of this encounter: 117.3 kg (258 lb 9.6 oz).    Physical Exam  GENERAL APPEARANCE: obese, ambulatory w/o assist, alert and no distress  EYES: pink conj, no icterus, PERRL, EOMI  HENT: ear canals and TM's normal, nose clear,  mouth without ulcers or lesions, oropharynx clear and oral mucous membranes moist  NECK: no adenopathy, no asymmetry, " masses, or scars and thyroid normal to palpation  RESP: lungs clear to auscultation - no rales, rhonchi or wheezes  CV:  normal rate, regular rhythm, grade 2 systolic RUPSB  murmur,   ABDOMEN: soft, nontender, no hepatosplenomegaly, no masses and bowel sounds normal  DIRECT RECTAL EXAM: deferred  MS: no musculoskeletal defects are noted and gait is age appropriate without ataxia; mild pitting non-tender bipedal  edema from ankles to midcalf bilaterally; slightly erythematous patch medial aspect of right distal lower leg with no ulcer or tenderness (patient said it was a blister and he popped it).  SKIN: no suspicious lesions or rashes  NEURO: Normal strength and tone, sensory exam grossly normal, mentation intact and speech normal          6/30/2025   Mini Cog   Clock Draw Score 2 Normal   3 Item Recall 2 objects recalled   Mini Cog Total Score 4              Signed Electronically by: Gilberto Alonzo MD

## 2025-06-30 NOTE — PATIENT INSTRUCTIONS
Patient Education     Schedule venous competency ultrasound for both legs.    Be consistent with low salt, low trans fat and low saturated fat diet.  Eat food rich in omega-3-fatty acids as you tolerate. (salmon, olive oil)  Eat 5 cups of vegetables, fruits and whole grains per day.  Limit starchy food (white rice, white bread, white pasta, white potatoes) to less than a cup per meal.  Minimize sweets, junk food and fastfood. Limit soda beverages to one serving per day; best to avoid it altogether though.    Exercise: moderate intensity sustained for at least 30 mins per episode, goal of 150 mins per week at least  Combine cardiovascular and resistance exercises.  These exercise recommendations are in addition to your daily activity at work or home.  Work on losing weight.      Regular foot care; don't walk barefoot  Annual eye exam.  Please request your eye doctor to furnish a copy of the eye exam report to the clinic to be placed in your record.  Flu vaccine by the end of October yearly. Get RSV vaccine  as well.  Be sure to update any other recommended vaccinations for diabetics.    Blood tests: repeat A1c 3 months    Measure fasting blood sugar and sometimes 1-2 hours after meals for next few weeks.  Report if you are seeing blood sugars more than 150 frequently before breakfast, or more than 200 repeatedly at any time.    Preventative Care Visits include: Yearly physicals, Well-child visits, Welcome to Medicare visits, & Medicare yearly wellness exams.    The purpose of these visits is to discuss your medical history and prevent health problems before you are sick.  You may need to pay a copay, coinsurance or deductible if your visit today includes testing or treating for a new or existing condition.    Additional charges may be incurred for today's visit. If you have questions about what your insurance plan covers, please contact your Insurance Company's member service department.  If you have questions  specific to a bill you have already received from [employer] please contact the [facility]illing Office at [phone].       Preventive Care Advice   This is general advice given by our system to help you stay healthy. However, your care team may have specific advice just for you. Please talk to your care team about your preventive care needs.  Nutrition  Eat 5 or more servings of fruits and vegetables each day.  Try wheat bread, brown rice and whole grain pasta (instead of white bread, rice, and pasta).  Get enough calcium and vitamin D. Check the label on foods and aim for 100% of the RDA (recommended daily allowance).  Lifestyle  Exercise at least 150 minutes each week  (30 minutes a day, 5 days a week).  Do muscle strengthening activities 2 days a week. These help control your weight and prevent disease.  No smoking.  Wear sunscreen to prevent skin cancer.  Have a dental exam and cleaning every 6 months.  Yearly exams  See your health care team every year to talk about:  Any changes in your health.  Any medicines your care team has prescribed.  Preventive care, family planning, and ways to prevent chronic diseases.  Shots (vaccines)   HPV shots (up to age 26), if you've never had them before.  Hepatitis B shots (up to age 59), if you've never had them before.  COVID-19 shot: Get this shot when it's due.  Flu shot: Get a flu shot every year.  Tetanus shot: Get a tetanus shot every 10 years.  Pneumococcal, hepatitis A, and RSV shots: Ask your care team if you need these based on your risk.  Shingles shot (for age 50 and up)  General health tests  Diabetes screening:  Starting at age 35, Get screened for diabetes at least every 3 years.  If you are younger than age 35, ask your care team if you should be screened for diabetes.  Cholesterol test: At age 39, start having a cholesterol test every 5 years, or more often if advised.  Bone density scan (DEXA): At age 50, ask your care team if you should have this scan  for osteoporosis (brittle bones).  Hepatitis C: Get tested at least once in your life.  STIs (sexually transmitted infections)  Before age 24: Ask your care team if you should be screened for STIs.  After age 24: Get screened for STIs if you're at risk. You are at risk for STIs (including HIV) if:  You are sexually active with more than one person.  You don't use condoms every time.  You or a partner was diagnosed with a sexually transmitted infection.  If you are at risk for HIV, ask about PrEP medicine to prevent HIV.  Get tested for HIV at least once in your life, whether you are at risk for HIV or not.  Cancer screening tests  Cervical cancer screening: If you have a cervix, begin getting regular cervical cancer screening tests starting at age 21.  Breast cancer scan (mammogram): If you've ever had breasts, begin having regular mammograms starting at age 40. This is a scan to check for breast cancer.  Colon cancer screening: It is important to start screening for colon cancer at age 45.  Have a colonoscopy test every 10 years (or more often if you're at risk) Or, ask your provider about stool tests like a FIT test every year or Cologuard test every 3 years.  To learn more about your testing options, visit:   .  For help making a decision, visit:   https://bit.ly/qo45261.  Prostate cancer screening test: If you have a prostate, ask your care team if a prostate cancer screening test (PSA) at age 55 is right for you.  Lung cancer screening: If you are a current or former smoker ages 50 to 80, ask your care team if ongoing lung cancer screenings are right for you.  For informational purposes only. Not to replace the advice of your health care provider. Copyright   2023 [employer]. All rights reserved. Clinically reviewed by the [facility]. [place] [id number] - REV 01/24.  Learning About Activities of Daily Living  What are activities of daily living?     Activities of daily  living (ADLs) are the basic self-care tasks you do every day. These include eating, bathing, dressing, and moving around.  As you age, and if you have health problems, you may find that it's harder to do some of these tasks. If so, your doctor can suggest ideas that may help.  To measure what kind of help you may need, your doctor will ask how well you are able to do ADLs. Let your doctor know if there are any tasks that you are having trouble doing. This is an important first step to getting help. And when you have the help you need, you can stay as independent as possible.  How will a doctor assess your ADLs?  Asking about ADLs is part of a routine health checkup your doctor will likely do as you age. Your health check might be done in a doctor's office, in your home, or at a hospital. The goal is to find out if you are having any problems that could make it hard to care for yourself or that make it unsafe for you to be on your own.  To measure your ADLs, your doctor will ask how hard it is for you to do routine tasks. Your doctor may also want to know if you have changed the way you do a task because of a health problem. Your doctor may watch how you:  Walk back and forth.  Keep your balance while you stand or walk.  Move from sitting to standing or from a bed to a chair.  Button or unbutton a shirt or sweater.  Remove and put on your shoes.  It's common to feel a little worried or anxious if you find you can't do all the things you used to be able to do. Talking with your doctor about ADLs is a way to make sure you're as safe as possible and able to care for yourself as well as you can. You may want to bring a caregiver, friend, or family member to your checkup. They can help you talk to your doctor.  Follow-up care is a key part of your treatment and safety. Be sure to make and go to all appointments, and call your doctor if you are having problems. It's also a good idea to know your test results and keep a  list of the medicines you take.  Current as of: October 24, 2024  Content Version: 14.5    [id number] [employer].   Care instructions adapted under license by your healthcare professional. If you have questions about a medical condition or this instruction, always ask your healthcare professional. [employer] disclaims any warranty or liability for your use of this information.    Preventing Falls: Care Instructions  Injuries and health problems such as trouble walking or poor eyesight can increase your risk of falling. So can some medicines. But there are things you can do to help prevent falls. You can exercise to get stronger. You can also arrange your home to make it safer.    Talk to your doctor about the medicines you take. Ask if any of them increase the risk of falls and whether they can be changed or stopped.   Try to exercise regularly. It can help improve your strength and balance. This can help lower your risk of falling.         Practice fall safety and prevention.   Wear low-heeled shoes that fit well and give your feet good support. Talk to your doctor if you have foot problems that make this hard.  Carry a cellphone or wear a medical alert device that you can use to call for help.  Use stepladders instead of chairs to reach high objects. Don't climb if you're at risk for falls. Ask for help, if needed.  Wear the correct eyeglasses, if you need them.        Make your home safer.   Remove rugs, cords, clutter, and furniture from walkways.  Keep your house well lit. Use night-lights in hallways and bathrooms.  Install and use sturdy handrails on stairways.  Wear nonskid footwear, even inside. Don't walk barefoot or in socks without shoes.        Be safe outside.   Use handrails, curb cuts, and ramps whenever possible.  Keep your hands free by using a shoulder bag or backpack.  Try to walk in well-lit areas. Watch out for uneven ground, changes in pavement, and debris.  Be careful  "in the winter. Walk on the grass or gravel when sidewalks are slippery. Use de-icer on steps and walkways. Add non-slip devices to shoes.    Put grab bars and nonskid mats in your shower or tub and near the toilet. Try to use a shower chair or bath bench when bathing.   Get into a tub or shower by putting in your weaker leg first. Get out with your strong side first. Have a phone or medical alert device in the bathroom with you.   Where can you learn more?  Go to https://www.[employer].net/patiented  Enter G117 in the search box to learn more about \"Preventing Falls: Care Instructions.\"  Current as of: July 31, 2024  Content Version: 14.5 2024-2025 [employer].   Care instructions adapted under license by your healthcare professional. If you have questions about a medical condition or this instruction, always ask your healthcare professional. [employer] disclaims any warranty or liability for your use of this information.    Hearing Loss: Care Instructions  Overview     Hearing loss is a sudden or slow decrease in how well you hear. It can range from slight to profound. Permanent hearing loss can occur with aging. It also can happen when you are exposed long-term to loud noise. Examples include listening to loud music, riding motorcycles, or being around other loud machines.  Hearing loss can affect your work and home life. It can make you feel lonely or depressed. You may feel that you have lost your independence. But hearing aids and other devices can help you hear better and feel connected to others.  Follow-up care is a key part of your treatment and safety. Be sure to make and go to all appointments, and call your doctor if you are having problems. It's also a good idea to know your test results and keep a list of the medicines you take.  How can you care for yourself at home?  Avoid loud noises whenever possible. This helps keep your hearing from getting worse.  Always wear hearing " "protection around loud noises.  Wear a hearing aid as directed.  A professional can help you pick a hearing aid that will work best for you.  You can also get hearing aids over the counter for mild to moderate hearing loss.  Have hearing tests as your doctor suggests. They can show whether your hearing has changed. Your hearing aid may need to be adjusted.  Use other devices as needed. These may include:  Telephone amplifiers and hearing aids that can connect to a television, stereo, radio, or microphone.  Devices that use lights or vibrations. These alert you to the doorbell, a ringing telephone, or a baby monitor.  Television closed-captioning. This shows the words at the bottom of the screen. Most new TVs can do this.  TTY (text telephone). This lets you type messages back and forth on the telephone instead of talking or listening. These devices are also called TDD. When messages are typed on the keyboard, they are sent over the phone line to a receiving TTY. The message is shown on a monitor.  Use text messaging, social media, and email if it is hard for you to communicate by telephone.  Try to learn a listening technique called speechreading. It is not lipreading. You pay attention to people's gestures, expressions, posture, and tone of voice. These clues can help you understand what a person is saying. Face the person you are talking to, and have them face you. Make sure the lighting is good. You need to see the other person's face clearly.  Think about counseling if you need help to adjust to your hearing loss.  When should you call for help?  Watch closely for changes in your health, and be sure to contact your doctor if:    You think your hearing is getting worse.     You have new symptoms, such as dizziness or nausea.   Where can you learn more?  Go to https://www.healthwise.net/patiented  Enter R798 in the search box to learn more about \"Hearing Loss: Care Instructions.\"  Current as of: October 27, " 2024  Content Version: 14.5 2024-2025 [employer].   Care instructions adapted under license by your healthcare professional. If you have questions about a medical condition or this instruction, always ask your healthcare professional. [employer] disclaims any warranty or liability for your use of this information.

## 2025-07-15 ENCOUNTER — ANCILLARY PROCEDURE (OUTPATIENT)
Dept: ULTRASOUND IMAGING | Facility: OTHER | Age: 80
End: 2025-07-15
Attending: FAMILY MEDICINE
Payer: COMMERCIAL

## 2025-07-15 DIAGNOSIS — I89.0 LYMPHEDEMA: ICD-10-CM

## 2025-07-15 DIAGNOSIS — I83.893 VARICOSE VEINS OF BOTH LEGS WITH EDEMA: ICD-10-CM

## 2025-07-15 PROCEDURE — 93970 EXTREMITY STUDY: CPT | Mod: TC | Performed by: STUDENT IN AN ORGANIZED HEALTH CARE EDUCATION/TRAINING PROGRAM

## 2025-07-21 DIAGNOSIS — N18.31 STAGE 3A CHRONIC KIDNEY DISEASE (H): ICD-10-CM

## 2025-07-21 RX ORDER — ATORVASTATIN CALCIUM 10 MG/1
10 TABLET, FILM COATED ORAL DAILY
Qty: 90 TABLET | Refills: 3 | Status: SHIPPED | OUTPATIENT
Start: 2025-07-21

## 2025-07-29 ENCOUNTER — HOSPITAL ENCOUNTER (OUTPATIENT)
Dept: CARDIOLOGY | Facility: CLINIC | Age: 80
Discharge: HOME | End: 2025-07-29
Attending: FAMILY MEDICINE
Payer: COMMERCIAL

## 2025-07-29 ENCOUNTER — MYC REFILL (OUTPATIENT)
Dept: NEPHROLOGY | Facility: CLINIC | Age: 80
End: 2025-07-29
Payer: COMMERCIAL

## 2025-07-29 DIAGNOSIS — N18.31 STAGE 3A CHRONIC KIDNEY DISEASE (H): ICD-10-CM

## 2025-07-29 DIAGNOSIS — R01.1 UNDIAGNOSED CARDIAC MURMURS: ICD-10-CM

## 2025-07-29 LAB — LVEF ECHO: NORMAL

## 2025-07-29 PROCEDURE — 999N000208 ECHOCARDIOGRAM COMPLETE

## 2025-07-29 PROCEDURE — 93306 TTE W/DOPPLER COMPLETE: CPT | Mod: 26 | Performed by: INTERNAL MEDICINE

## 2025-07-29 PROCEDURE — 255N000002 HC RX 255 OP 636: Performed by: FAMILY MEDICINE

## 2025-07-29 RX ORDER — ATORVASTATIN CALCIUM 10 MG/1
10 TABLET, FILM COATED ORAL DAILY
Qty: 90 TABLET | Refills: 3 | Status: CANCELLED | OUTPATIENT
Start: 2025-07-29

## 2025-07-29 RX ADMIN — HUMAN ALBUMIN MICROSPHERES AND PERFLUTREN 2 ML (DILUTED): 10; .22 INJECTION, SOLUTION INTRAVENOUS at 15:11

## 2025-07-29 NOTE — TELEPHONE ENCOUNTER
Nephrology Note: Medication Refill Request    Medication Refill Request:     Medication/Dose/Frequency: Atorvastatin 10 mg  Preferred Pharmacy: [facility]  Provider:   Dr Loya    Duplicate request        Refill on 7/21.25                  SITUATION/BACKROUND:                 Last office visit: 6/24/25        Future office visit: 10/28/25     ASSESSMENT:     Neph Assessments:    Recent Labs:  CBC Results:  Recent Labs   Lab Test 06/19/25  1021 01/30/25  1025   WBC  --  6.6   RBC  --  4.93   HGB 14.0 14.8   HCT  --  44.8   MCV 91 91   MCH  --  30.0   MCHC  --  33.0   RDW  --  12.9   PLT  --  215     Last Renal Panel:  Sodium   Date Value Ref Range Status   06/19/2025 135 135 - 145 mmol/L Final   11/06/2018 137 133 - 144 mmol/L Final     Potassium   Date Value Ref Range Status   06/19/2025 4.6 3.4 - 5.3 mmol/L Final   04/18/2022 4.6 3.4 - 5.3 mmol/L Final   11/06/2018 4.5 3.4 - 5.3 mmol/L Final     Chloride   Date Value Ref Range Status   06/19/2025 101 98 - 107 mmol/L Final   04/18/2022 103 94 - 109 mmol/L Final   11/06/2018 104 94 - 109 mmol/L Final     Carbon Dioxide   Date Value Ref Range Status   11/06/2018 25 20 - 32 mmol/L Final     Carbon Dioxide (CO2)   Date Value Ref Range Status   06/19/2025 23 22 - 29 mmol/L Final   04/18/2022 28 20 - 32 mmol/L Final     Anion Gap   Date Value Ref Range Status   06/19/2025 11 7 - 15 mmol/L Final   04/18/2022 4 3 - 14 mmol/L Final   11/06/2018 8 3 - 14 mmol/L Final     Glucose   Date Value Ref Range Status   06/19/2025 138 (H) 70 - 99 mg/dL Final   04/18/2022 122 (H) 70 - 99 mg/dL Final   11/06/2018 101 (H) 70 - 99 mg/dL Final     GLUCOSE BY METER POCT   Date Value Ref Range Status   11/25/2022 137 (H) 70 - 99 mg/dL Final     Comment:     /RN Notified     Urea Nitrogen   Date Value Ref Range Status   06/19/2025 39.6 (H) 8.0 - 23.0 mg/dL Final   04/18/2022 26 7 - 30 mg/dL Final   11/06/2018 36 (H) 7 - 30 mg/dL Final     Creatinine   Date Value Ref Range Status    06/19/2025 1.59 (H) 0.67 - 1.17 mg/dL Final   11/06/2018 1.46 (H) 0.66 - 1.25 mg/dL Final     GFR Estimate   Date Value Ref Range Status   06/19/2025 44 (L) >60 mL/min/1.73m2 Final     Comment:     eGFR calculated using 2021 CKD-EPI equation.   11/06/2018 47 (L) >60 mL/min/1.7m2 Final     Comment:     Non  GFR Calc     Calcium   Date Value Ref Range Status   06/19/2025 10.1 8.8 - 10.4 mg/dL Final   11/06/2018 9.2 8.5 - 10.1 mg/dL Final     Phosphorus   Date Value Ref Range Status   06/19/2025 3.1 2.5 - 4.5 mg/dL Final     Albumin   Date Value Ref Range Status   06/19/2025 4.3 3.5 - 5.2 g/dL Final       Current Medication List:   Current Outpatient Medications (Antihypertensive, Cardiovascular, Diuretics, Beta blockers, Calcium blockers, Anticoagulants)   Medication Sig    amLODIPine (NORVASC) 2.5 MG tablet Take 1 tablet (2.5 mg) by mouth daily.    atenolol (TENORMIN) 25 MG tablet Take 0.5 tablets (12.5 mg) by mouth daily. CHECK BLOOD PRESSURE AT HOME OR SCHEDULE NURSE VISIT FOR BP CHECK WITHIN THE NEXT 30 DAYS.    chlorthalidone (HYGROTON) 25 MG tablet Take 0.5 tablets (12.5 mg) by mouth every other day.    lisinopril (ZESTRIL) 40 MG tablet Take 1 tablet (40 mg) by mouth daily.     Current Outpatient Medications (Other)   Medication Sig    aspirin (ASA) 81 MG EC tablet Take 81 mg by mouth daily    atorvastatin (LIPITOR) 10 MG tablet Take 1 tablet (10 mg) by mouth daily.    cetirizine (ZYRTEC) 10 MG tablet Take 10 mg by mouth every evening    Glucose Blood (BLOOD GLUCOSE TEST STRIPS) STRP by In Vitro route daily    levothyroxine (SYNTHROID/LEVOTHROID) 137 MCG tablet TAKE 1 TABLET BY MOUTH DAILY.  WELLNESS EXAM    loratadine (CLARITIN) 10 MG tablet Take 10 mg by mouth       Duplicate refill request  PLAN:     Medication refilled per protocol: No, duplicate.    MICAELA ROBINS RN

## 2025-07-30 ENCOUNTER — PATIENT OUTREACH (OUTPATIENT)
Dept: CARE COORDINATION | Facility: CLINIC | Age: 80
End: 2025-07-30
Payer: COMMERCIAL

## 2025-07-31 ENCOUNTER — TELEPHONE (OUTPATIENT)
Dept: VASCULAR SURGERY | Facility: CLINIC | Age: 80
End: 2025-07-31
Payer: COMMERCIAL

## 2025-07-31 NOTE — TELEPHONE ENCOUNTER
Caller: [place]    Provider: none    Detailed reason for call: called to schedule referral but I dont see that it was finished. Can you review and advise on how to schedule please.    Best phone number to contact: [phone]    Best time to contact: any    Ok to leave a detailed message: Yes    Ok to speak to authorized person if needed: unknown      (Noted to patient if reason is related to wound or incision, to please send a photo via email or [employer].)

## 2025-09-01 ENCOUNTER — MYC REFILL (OUTPATIENT)
Dept: NEPHROLOGY | Facility: CLINIC | Age: 80
End: 2025-09-01
Payer: COMMERCIAL

## 2025-09-01 DIAGNOSIS — I10 PRIMARY HYPERTENSION: ICD-10-CM

## 2025-09-03 RX ORDER — CHLORTHALIDONE 25 MG/1
12.5 TABLET ORAL EVERY OTHER DAY
Qty: 42 TABLET | Refills: 3 | Status: SHIPPED | OUTPATIENT
Start: 2025-09-03

## (undated) DEVICE — SU VICRYL 3-0 SH 8X18" UND J864D

## (undated) DEVICE — ESU CORD BIPOLAR GREEN 10-4000

## (undated) DEVICE — SYR BULB IRRIG DOVER 60 ML LATEX FREE 67000

## (undated) DEVICE — SOL WATER IRRIG 1000ML BOTTLE 07139-09

## (undated) DEVICE — SYR 10ML FINGER CONTROL W/O NDL 309695

## (undated) DEVICE — NDL 25GA 1.5" 305127

## (undated) DEVICE — SPONGE RAY-TEC 4X8" 7318

## (undated) DEVICE — NDL COUNTER 20CT 31142493

## (undated) DEVICE — SUCTION TIP YANKAUER STR K87

## (undated) DEVICE — GLOVE PROTEXIS W/NEU-THERA 7.5  2D73TE75

## (undated) DEVICE — SU CHROMIC 2-0 SH 27" G123H

## (undated) DEVICE — BASIN SET MINOR DISP

## (undated) DEVICE — GOWN XLG DISP 9545

## (undated) DEVICE — TUBING SUCTION 12"X1/4" N612

## (undated) DEVICE — BLADE KNIFE SURG 15 371115

## (undated) DEVICE — PACK BASIC 9103

## (undated) RX ORDER — FENTANYL CITRATE-0.9 % NACL/PF 10 MCG/ML
PLASTIC BAG, INJECTION (ML) INTRAVENOUS
Status: DISPENSED
Start: 2021-12-02

## (undated) RX ORDER — PROPOFOL 10 MG/ML
INJECTION, EMULSION INTRAVENOUS
Status: DISPENSED
Start: 2021-12-02

## (undated) RX ORDER — EPHEDRINE SULFATE 50 MG/ML
INJECTION, SOLUTION INTRAMUSCULAR; INTRAVENOUS; SUBCUTANEOUS
Status: DISPENSED
Start: 2021-12-02

## (undated) RX ORDER — FENTANYL CITRATE 50 UG/ML
INJECTION, SOLUTION INTRAMUSCULAR; INTRAVENOUS
Status: DISPENSED
Start: 2021-12-02

## (undated) RX ORDER — ACETAMINOPHEN 325 MG/1
TABLET ORAL
Status: DISPENSED
Start: 2021-12-02

## (undated) RX ORDER — PROPOFOL 10 MG/ML
INJECTION, EMULSION INTRAVENOUS
Status: DISPENSED
Start: 2022-11-07

## (undated) RX ORDER — LIDOCAINE HYDROCHLORIDE 10 MG/ML
INJECTION, SOLUTION EPIDURAL; INFILTRATION; INTRACAUDAL; PERINEURAL
Status: DISPENSED
Start: 2021-12-02

## (undated) RX ORDER — DEXAMETHASONE SODIUM PHOSPHATE 10 MG/ML
INJECTION, SOLUTION INTRAMUSCULAR; INTRAVENOUS
Status: DISPENSED
Start: 2021-12-02

## (undated) RX ORDER — ONDANSETRON 2 MG/ML
INJECTION INTRAMUSCULAR; INTRAVENOUS
Status: DISPENSED
Start: 2021-12-02

## (undated) RX ORDER — LIDOCAINE HYDROCHLORIDE AND EPINEPHRINE 10; 10 MG/ML; UG/ML
INJECTION, SOLUTION INFILTRATION; PERINEURAL
Status: DISPENSED
Start: 2021-12-02